# Patient Record
Sex: FEMALE | Race: AMERICAN INDIAN OR ALASKA NATIVE
[De-identification: names, ages, dates, MRNs, and addresses within clinical notes are randomized per-mention and may not be internally consistent; named-entity substitution may affect disease eponyms.]

---
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
## 2018-02-26 NOTE — EDM.PDOC
Scribed by Rehana Aguilera 02/25/18 1911 for Cady Guaman NP





ED HPI GENERAL MEDICAL PROBLEM





- General


Chief Complaint: General


Stated Complaint: [id number] DIZZY FOR 3 DAYS-GETTING WORSE


Time Seen by Provider: 02/25/18 17:23


Source of Information: Reports: Patient, RN, RN Notes Reviewed


History Limitations: Reports: No Limitations





- History of Present Illness


INITIAL COMMENTS - FREE TEXT/NARRATIVE: 


Patient presents to ER with complaint of dizziness which began 4 days ago. 

Yesterday she had visual disturbance--more blurred than usual. Denies ringing 

in her ears or ear pain. She is taking Claritin for sinuses. She has sinus 

congestion and nausea. She denies cough, fever, chills, vomiting, diarrhea, 

sore throat, headache, chest pains, palpitations or shortness of breath. 


Onset: Gradual


Location: Reports: Head


Quality: Reports: Ache


Severity: Moderate


Improves with: Reports: None


Worsens with: Reports: None


Associated Symptoms: Reports: No Other Symptoms





- Related Data


 Allergies











Allergy/AdvReac Type Severity Reaction Status Date / Time


 


amitriptyline HCl Allergy  Cannot Verified 02/25/18 16:58





[From Elavil]   Remember  


 


amlodipine besylate Allergy  Dizziness Verified 02/25/18 16:58





[From Norvasc]     


 


codeine Allergy  Irritabilit Verified 02/25/18 16:58





   y  


 


gemfibrozil [From Lopid] Allergy  Cannot Verified 02/25/18 16:58





   Remember  


 


metformin Allergy  Shaking Verified 02/25/18 16:58


 


Penicillins Allergy  Rash Verified 02/25/18 16:58


 


propranolol HCl Allergy  Cannot Verified 02/25/18 16:58





[From Inderal LA]   Remember  











Home Meds: 


 Home Meds





Acetaminophen 650 mg PO Q4HR PRN 12/16/14 [History]


Aspirin [Ecotrin] 81 mg PO DAILY 12/16/14 [History]


Furosemide [Furosemide] 1 tab PO DAILY 12/16/14 [History]


Insulin Aspart [NovoLOG] 20 unit SQ TID 12/16/14 [History]


Insulin Detemir [Levemir] 75 unit SQ BEDTIME 12/16/14 [History]


Lisinopril [Prinivil] 20 mg PO DAILY 12/16/14 [History]


Loratadine 10 mg PO DAILY 12/16/14 [History]


Metoprolol Succinate [Toprol XL] 50 mg PO DAILY 12/16/14 [History]


amLODIPine Besylate [Amlodipine Besylate] 10 mg PO DAILY 12/16/14 [History]


Ibuprofen [Motrin] 800 mg PO Q6H PRN 09/10/15 [History]


Clopidogrel [Plavix] 75 mg PO DAILY 02/16/16 [History]


Pioglitazone [Actos] 30 mg PO DAILY 02/25/18 [History]


glipiZIDE [Glucotrol] 10 mg PO BID 02/25/18 [History]











Past Medical History


HEENT History: Reports: Impaired Vision


Other HEENT History: wears glasses


Cardiovascular History: Reports: CAD, High Cholesterol, Hypertension, MI


Musculoskeletal History: Reports: Osteoarthritis


Neurological History: Reports: Neuropathy, Diabetic


Endocrine/Metabolic History: Reports: Diabetes, Type II, Obesity/BMI 30+





- Past Surgical History


HEENT Surgical History: Reports: Cataract Surgery


Cardiovascular Surgical History: Reports: Coronary Artery Bypass


Musculoskeletal Surgical History: Reports: Other (See Below)





Social & Family History





- Family History


Family Medical History: Noncontributory





- Tobacco Use


Smoking Status *Q: Never Smoker


Second Hand Smoke Exposure: No





- Caffeine Use


Caffeine Use: Reports: Coffee





- Alcohol Use


Days Per Week of Alcohol Use: 0





- Recreational Drug Use


Recreational Drug Use: No





- Living Situation & Occupation


Living situation: Reports: 


Occupation: Retired





ED ROS GENERAL





- Review of Systems


Review Of Systems: ROS reveals no pertinent complaints other than HPI.





ED EXAM, GENERAL





- Physical Exam


Exam: See Below


Exam Limited By: No Limitations


General Appearance: Alert, WD/WN, No Apparent Distress


Eye Exam: Bilateral Eye: Other (pupils -3 sluggish. )


Ears: Normal External Exam, Normal Canal, Hearing Grossly Normal, Normal TMs


Nose: Other (maxillary sinus tenderness.)


Throat/Mouth: Other (erythematous oropharynx)


Head: Atraumatic, Normocephalic


Neck: Normal Inspection, Supple, Non-Tender, Full Range of Motion


Respiratory/Chest: Crackles (bases bilateral)


Cardiovascular: Regular Rate, Rhythm


GI/Abdominal: Normal Bowel Sounds, Soft, Non-Tender, No Organomegaly, No 

Distention, No Abnormal Bruit, No Mass


 (Female) Exam: Deferred


Rectal (Female) Exam: Deferred


Back Exam: Normal Inspection, Full Range of Motion, NT


Extremities: Normal Inspection, Normal Range of Motion, Non-Tender, Normal 

Capillary Refill, No Pedal Edema


Neurological: Alert, Oriented, CN II-XII Intact, Normal Cognition, Normal Gait, 

Normal Reflexes, No Motor/Sensory Deficits


Psychiatric: Normal Affect, Normal Mood


Skin Exam: Warm, Dry, Intact, Normal Color, No Rash


Lymphatic: No Adenopathy





EKG INTERPRETATION


EKG Date: 02/25/18


Time: 17:35


Rhythm: Other (sinus bradycardia)


Rate (Beats/Min): 57


Comparison: Change From Previous EKG





Course





- Vital Signs


Last Recorded V/S: 


 Last Vital Signs











Temp  97.8 F   02/25/18 19:40


 


Pulse  57 L  02/25/18 19:40


 


Resp  16   02/25/18 19:40


 


BP  126/62   02/25/18 19:40


 


Pulse Ox  100   02/25/18 19:40














- Orders/Labs/Meds


Orders: 


 Active Orders 24 hr











 Category Date Time Status


 


 EKG Documentation Completion [RC] STAT Care  02/25/18 17:32 Active


 


 CULTURE URINE [RM] Stat Lab  02/25/18 18:13 Received











Labs: 


 Laboratory Tests











  02/25/18 02/25/18 02/25/18 Range/Units





  17:40 17:40 17:40 


 


WBC  7.3    (5.0-10.0)  10^3/uL


 


RBC  3.38 L    (4.2-5.4)  10^6/uL


 


Hgb  9.3 L D    (12.0-16.0)  g/dL


 


Hct  29.4 L    (37.0-47.0)  %


 


MCV  87.0    ()  fL


 


MCH  27.5    (27.0-34.0)  pg


 


MCHC  31.6 L    (33.0-35.0)  g/dL


 


Plt Count  416    (150-450)  10^3/uL


 


Neut % (Auto)  47.3    (42.2-75.2)  %


 


Lymph % (Auto)  33.7    (20.5-50.1)  %


 


Mono % (Auto)  13.9 H    (2-8)  %


 


Eos % (Auto)  4.4 H    (1.0-3.0)  %


 


Baso % (Auto)  0.7    (0.0-1.0)  %


 


Sodium   133 L   (135-145)  mmol/L


 


Potassium   4.9   (3.6-5.0)  mmol/L


 


Chloride   108   (101-111)  mmol/L


 


Carbon Dioxide   17.0 L   (21.0-31.0)  mmol/L


 


Anion Gap   12.9   


 


BUN   36 H   (7-18)  mg/dL


 


Creatinine   1.3   (0.6-1.3)  mg/dL


 


Est Cr Clr Drug Dosing   TNP   


 


Estimated GFR (MDRD)   40   


 


BUN/Creatinine Ratio   27.69   


 


Glucose   243 H   ()  mg/dL


 


Calcium   8.8   (8.4-10.2)  mg/dl


 


Total Bilirubin   0.2   (0.2-1.0)  mg/dL


 


AST   13   (10-42)  IU/L


 


ALT   11   (10-60)  IU/L


 


Alkaline Phosphatase   83   ()  IU/L


 


Creatine Kinase    32  ()  IU/L


 


Creatine Kinase Index    6.3 H  (0-2.4)  %


 


CK-MB (CK-2)    2.00  (0.4-4.7)  ng/mL


 


Troponin I     (0.00-0.02)  ng/ml


 


Total Protein   7.0   (6.7-8.2)  g/dl


 


Albumin   2.8 L   (3.2-5.5)  g/dl


 


Globulin   4.2   


 


Albumin/Globulin Ratio   0.67   


 


Urine Color     (YELLOW)  


 


Urine Appearance     (CLEAR)  


 


Urine pH     (5.0-9.0)  


 


Ur Specific Gravity     (1.005-1.030)  


 


Urine Protein     (NEGATIVE)  


 


Urine Glucose (UA)     (NEGATIVE)  


 


Urine Ketones     (NEGATIVE)  


 


Urine Occult Blood     (NEGATIVE)  


 


Urine Nitrite     (NEGATIVE)  


 


Urine Bilirubin     (NEGATIVE)  


 


Urine Urobilinogen     (0.2-1.0)  mg/dL


 


Ur Leukocyte Esterase     (NEGATIVE)  


 


Urine RBC     /HPF


 


Urine WBC     (0-5/HPF)  /HPF


 


Ur Epithelial Cells     /HPF


 


Amorphous Sediment     (0/HPF)  /HPF


 


Urine Bacteria     (0-FEW/HPF)  /HPF


 


Urine Mucus     /LPF


 


Urine Yeast     (0/HPF)  /HPF














  02/25/18 02/25/18 Range/Units





  17:40 18:13 


 


WBC    (5.0-10.0)  10^3/uL


 


RBC    (4.2-5.4)  10^6/uL


 


Hgb    (12.0-16.0)  g/dL


 


Hct    (37.0-47.0)  %


 


MCV    ()  fL


 


MCH    (27.0-34.0)  pg


 


MCHC    (33.0-35.0)  g/dL


 


Plt Count    (150-450)  10^3/uL


 


Neut % (Auto)    (42.2-75.2)  %


 


Lymph % (Auto)    (20.5-50.1)  %


 


Mono % (Auto)    (2-8)  %


 


Eos % (Auto)    (1.0-3.0)  %


 


Baso % (Auto)    (0.0-1.0)  %


 


Sodium    (135-145)  mmol/L


 


Potassium    (3.6-5.0)  mmol/L


 


Chloride    (101-111)  mmol/L


 


Carbon Dioxide    (21.0-31.0)  mmol/L


 


Anion Gap    


 


BUN    (7-18)  mg/dL


 


Creatinine    (0.6-1.3)  mg/dL


 


Est Cr Clr Drug Dosing    


 


Estimated GFR (MDRD)    


 


BUN/Creatinine Ratio    


 


Glucose    ()  mg/dL


 


Calcium    (8.4-10.2)  mg/dl


 


Total Bilirubin    (0.2-1.0)  mg/dL


 


AST    (10-42)  IU/L


 


ALT    (10-60)  IU/L


 


Alkaline Phosphatase    ()  IU/L


 


Creatine Kinase    ()  IU/L


 


Creatine Kinase Index    (0-2.4)  %


 


CK-MB (CK-2)    (0.4-4.7)  ng/mL


 


Troponin I  < 0.02   (0.00-0.02)  ng/ml


 


Total Protein    (6.7-8.2)  g/dl


 


Albumin    (3.2-5.5)  g/dl


 


Globulin    


 


Albumin/Globulin Ratio    


 


Urine Color   Yellow  (YELLOW)  


 


Urine Appearance   Turbid  (CLEAR)  


 


Urine pH   5.0  (5.0-9.0)  


 


Ur Specific Gravity   1.015  (1.005-1.030)  


 


Urine Protein   100 H  (NEGATIVE)  


 


Urine Glucose (UA)   500 H  (NEGATIVE)  


 


Urine Ketones   Negative  (NEGATIVE)  


 


Urine Occult Blood   Moderate H  (NEGATIVE)  


 


Urine Nitrite   Negative  (NEGATIVE)  


 


Urine Bilirubin   Negative  (NEGATIVE)  


 


Urine Urobilinogen   0.2  (0.2-1.0)  mg/dL


 


Ur Leukocyte Esterase   Large H  (NEGATIVE)  


 


Urine RBC   50-75 H  /HPF


 


Urine WBC   >100 H  (0-5/HPF)  /HPF


 


Ur Epithelial Cells   Many H  /HPF


 


Amorphous Sediment   Many H  (0/HPF)  /HPF


 


Urine Bacteria   Many H  (0-FEW/HPF)  /HPF


 


Urine Mucus   Many H  /LPF


 


Urine Yeast   Moderate H  (0/HPF)  /HPF











Meds: 


Medications














Discontinued Medications














Generic Name Dose Route Start Last Admin





  Trade Name Leda  PRN Reason Stop Dose Admin


 


Fluconazole  150 mg  02/25/18 19:03  02/25/18 19:32





  Diflucan  PO  02/25/18 19:04  Not Given





  ONETIME ONE   


 


Fluconazole  200 mg  02/26/18 09:00  





  Diflucan  PO   





  DAILY LULA   


 


Fluconazole  Confirm  02/25/18 19:28  02/25/18 19:33





  Diflucan  Administered  02/25/18 19:29  Not Given





  Dose   





  100 mg   





  .ROUTE   





  .STK-MED ONE   


 


Fluconazole  Confirm  02/25/18 19:29  02/25/18 19:33





  Diflucan  Administered  02/25/18 19:30  Not Given





  Dose   





  100 mg   





  .ROUTE   





  .STK-MED ONE   


 


Nitrofurantoin Macrocrystals  100 mg  02/25/18 19:03  02/25/18 19:32





  Macrobid  PO  02/25/18 19:04  100 mg





  ONETIME ONE   Administration














Departure





- Departure


Time of Disposition: 19:07


Disposition: Home, Self-Care 01


Condition: Fair


Clinical Impression: 


 Candidiasis of urogenital site





UTI (urinary tract infection)


Qualifiers:


 Urinary tract infection type: site unspecified Hematuria presence: without 

hematuria Qualified Code(s): N39.0 - Urinary tract infection, site not specified








- Discharge Information


Instructions:  Vaginal Yeast Infection, Adult, Urinary Tract Infection, Adult


Forms:  ED Department Discharge


Additional Instructions: 


RX: Diflucan, Macrobid


Drink plenty of water


Follow up with your primary care facility, urology consult








- My Orders


Last 24 Hours: 


My Active Orders





02/25/18 17:32


EKG Documentation Completion [RC] STAT 





02/25/18 18:13


CULTURE URINE [RM] Stat 














- Assessment/Plan


Last 24 Hours: 


My Active Orders





02/25/18 17:32


EKG Documentation Completion [RC] STAT 





02/25/18 18:13


CULTURE URINE [RM] Stat 














I have read and agree with the documentation that has been completed regarding 

this visit. By signing this record, I attest that the documentation was 

completed in my physical presence and is an accurate record of the encounter.

## 2018-02-28 NOTE — EKG
02/25/2018- ANA CORDON -

 

EKG per my reading shows sinus rhythm at the rate of 57 with inferior Q-waves.

 

DD:  02/28/2018 11:31:01

DT:  02/28/2018 11:37:17

[facility]

Job #:  [id number]

## 2018-04-18 NOTE — EDM.PDOC
ED HPI GENERAL MEDICAL PROBLEM





- General


Stated Complaint: POTASSIUM


Time Seen by Provider: 04/18/18 13:50


Source of Information: Reports: Patient, RN, RN Notes Reviewed


History Limitations: Reports: No Limitations





- History of Present Illness


INITIAL COMMENTS - FREE TEXT/NARRATIVE: 


Pt presents to the ER from [facility]. She was referred to the ER by Dr. Rocha. Report from Dr. Rocha was that the patient had labs completed at the 

clinic and her Potassium was 5.9 with a Bicarb of 14. Patient is to be taking 

oral Sodium Bicarb but has not been taking her medications as prescribed. Dr. Rocha would like her further evaluated here. 


Pt. states she was seeing Dr. Rocha for a diabetic follow up appointment today 

when she was told her labs were abnormal and that she needed to come to the ER. 

Patient denies any palpitations, chest pain, SOB, fever, chills, N/V/D. She 

states she has had somewhat of a cough for the past week. Pt states she has not 

been taking her medications as prescribed. 


Onset: Gradual





- Related Data


 Allergies











Allergy/AdvReac Type Severity Reaction Status Date / Time


 


amitriptyline HCl Allergy  Cannot Verified 02/25/18 16:58





[From Elavil]   Remember  


 


amlodipine besylate Allergy  Dizziness Verified 02/25/18 16:58





[From Norvasc]     


 


codeine Allergy  Irritabilit Verified 02/25/18 16:58





   y  


 


gemfibrozil [From Lopid] Allergy  Cannot Verified 02/25/18 16:58





   Remember  


 


metformin Allergy  Shaking Verified 02/25/18 16:58


 


Penicillins Allergy  Rash Verified 02/25/18 16:58


 


propranolol HCl Allergy  Cannot Verified 02/25/18 16:58





[From Inderal LA]   Remember  











Home Meds: 


 Home Meds





Acetaminophen 650 mg PO Q4HR PRN 12/16/14 [History]


Aspirin [Ecotrin] 81 mg PO DAILY 12/16/14 [History]


Furosemide 1 tab PO DAILY 12/16/14 [History]


Insulin Aspart [NovoLOG] 20 unit SQ TID 12/16/14 [History]


Insulin Detemir [Levemir] 75 unit SQ BEDTIME 12/16/14 [History]


Metoprolol Succinate [Toprol XL] 50 mg PO DAILY 12/16/14 [History]


Ibuprofen [Motrin] 800 mg PO Q6H PRN 09/10/15 [History]


Clopidogrel [Plavix] 75 mg PO DAILY 02/16/16 [History]


Pioglitazone [Actos] 30 mg PO DAILY 02/25/18 [History]


glipiZIDE [Glucotrol] 10 mg PO BID 02/25/18 [History]


Docusate Sodium [Colace] 1 tab PO BID PRN 04/18/18 [History]


Ferrous Sulfate [Iron] 1 tab PO DAILY 04/18/18 [History]


Sodium Bicarbonate 1 tab PO BID 04/18/18 [History]


atorvaSTATin [Lipitor] 1 tab PO DAILY 04/18/18 [History]











Past Medical History


HEENT History: Reports: Impaired Vision


Other HEENT History: wears glasses


Cardiovascular History: Reports: CAD, High Cholesterol, Hypertension, MI


Musculoskeletal History: Reports: Osteoarthritis


Neurological History: Reports: Neuropathy, Diabetic


Endocrine/Metabolic History: Reports: Diabetes, Type II, Obesity/BMI 30+





- Past Surgical History


HEENT Surgical History: Reports: Cataract Surgery


Cardiovascular Surgical History: Reports: Coronary Artery Bypass


Musculoskeletal Surgical History: Reports: Other (See Below)





Social & Family History





- Family History


Family Medical History: Noncontributory





- Tobacco Use


Smoking Status *Q: Never Smoker


Second Hand Smoke Exposure: No





- Caffeine Use


Caffeine Use: Reports: Coffee





- Alcohol Use


Days Per Week of Alcohol Use: 0





- Recreational Drug Use


Recreational Drug Use: No





- Living Situation & Occupation


Living situation: Reports: 


Occupation: Retired





ED ROS GENERAL





- Review of Systems


Review Of Systems: ROS reveals no pertinent complaints other than HPI.





ED EXAM, GENERAL





- Physical Exam


Exam: See Below


Exam Limited By: No Limitations


General Appearance: Alert, WD/WN, No Apparent Distress


Eye Exam: Bilateral Eye: EOMI, Normal Inspection


Ears: Normal External Exam, Hearing Grossly Normal


Nose: Normal Inspection


Throat/Mouth: Normal Inspection, Normal Voice, No Airway Compromise


Head: Atraumatic, Normocephalic


Neck: Normal Inspection


Respiratory/Chest: No Respiratory Distress, Lungs Clear, Normal Breath Sounds, 

No Accessory Muscle Use, Chest Non-Tender


Cardiovascular: Normal Peripheral Pulses, Regular Rate, Rhythm, No Edema, No 

Gallop, No JVD, No Murmur, No Rub


Peripheral Pulses: 2+: Radial (L), Radial (R)


GI/Abdominal: Normal Bowel Sounds, Soft, Non-Tender, No Organomegaly, No 

Distention, No Abnormal Bruit, No Mass


 (Female) Exam: Deferred


Rectal (Female) Exam: Deferred


Back Exam: Normal Inspection, Full Range of Motion, NT


Extremities: Normal Inspection, Normal Range of Motion, Non-Tender, Normal 

Capillary Refill, No Pedal Edema


Neurological: Alert, Oriented, CN II-XII Intact, Normal Cognition, Normal Gait, 

Normal Reflexes, No Motor/Sensory Deficits


Psychiatric: Normal Affect, Normal Mood


Skin Exam: Warm, Dry, Intact, Normal Color, No Rash


Lymphatic: No Adenopathy





EKG INTERPRETATION


EKG Date: 04/18/18


Time: 14:11


Rhythm: NSR (PAC)


Rate (Beats/Min): 61


Axis: Normal


P-Wave: Present


QRS: Wide


ST-T: Normal


QT: Normal


Comparison: Change From Previous EKG





Course





- Vital Signs


Last Recorded V/S: 


 Last Vital Signs











Temp  98.4 F   04/18/18 15:20


 


Pulse  67   04/18/18 15:20


 


Resp  18   04/18/18 15:20


 


BP  151/65 H  04/18/18 15:20


 


Pulse Ox  100   04/18/18 15:20














- Orders/Labs/Meds


Orders: 


 Active Orders 24 hr











 Category Date Time Status


 


 Blood Glucose Check, Bedside [RC] ONETIME Care  04/18/18 15:15 Active


 


 EKG Documentation Completion [RC] STAT Care  04/18/18 13:52 Active


 


 Sodium Bicarbonate Med  04/18/18 14:35 Active





 650 mg PO DAILY   








 Medication Orders





Sodium Bicarbonate (Sodium Bicarbonate)  650 mg PO DAILY [facility]


   Last Admin: 04/18/18 14:47  Dose: 650 mg








Labs: 


 Laboratory Tests











  04/18/18 04/18/18 04/18/18 Range/Units





  14:00 14:00 15:15 


 


WBC  7.7    (5.0-10.0)  10^3/uL


 


RBC  3.67 L    (4.2-5.4)  10^6/uL


 


Hgb  10.3 L    (12.0-16.0)  g/dL


 


Hct  32.2 L    (37.0-47.0)  %


 


MCV  87.7    ()  fL


 


MCH  28.1    (27.0-34.0)  pg


 


MCHC  32.0 L    (33.0-35.0)  g/dL


 


Plt Count  388    (150-450)  10^3/uL


 


Neut % (Auto)  52.5    (42.2-75.2)  %


 


Lymph % (Auto)  36.7    (20.5-50.1)  %


 


Mono % (Auto)  8.2 H    (2-8)  %


 


Eos % (Auto)  2.3    (1.0-3.0)  %


 


Baso % (Auto)  0.3    (0.0-1.0)  %


 


Sodium   131 L   (135-145)  mmol/L


 


Potassium   6.2 H   (3.6-5.0)  mmol/L


 


Chloride   109   (101-111)  mmol/L


 


Carbon Dioxide   15.0 L   (21.0-31.0)  mmol/L


 


Anion Gap   13.2   


 


BUN   63 H D   (7-18)  mg/dL


 


Creatinine   1.6 H   (0.6-1.3)  mg/dL


 


Est Cr Clr Drug Dosing   24.77   mL/min


 


Estimated GFR (MDRD)   32   


 


BUN/Creatinine Ratio   39.37   


 


Glucose   296 H   ()  mg/dL


 


POC Glucose    278 H  ()  mg/dl


 


Calcium   9.0   (8.4-10.2)  mg/dl


 


Magnesium   2.1   (1.8-2.5)  mg/dL


 


Total Bilirubin   0.4   (0.2-1.0)  mg/dL


 


AST   15   (10-42)  IU/L


 


ALT   13   (10-60)  IU/L


 


Alkaline Phosphatase   99   ()  IU/L


 


Total Protein   7.8   (6.7-8.2)  g/dl


 


Albumin   3.3   (3.2-5.5)  g/dl


 


Globulin   4.5   


 


Albumin/Globulin Ratio   0.73   











Meds: 


Medications











Generic Name Dose Route Start Last Admin





  Trade Name Freq  PRN Reason Stop Dose Admin


 


Sodium Bicarbonate  650 mg  04/18/18 14:35  04/18/18 14:47





  Sodium Bicarbonate  PO   650 mg





  DAILY LULA   Administration





     





     





     





     














Discontinued Medications














Generic Name Dose Route Start Last Admin





  Trade Name Freq  PRN Reason Stop Dose Admin


 


Insulin Human Regular  20 unit  04/18/18 14:37  04/18/18 14:45





  Humulin R  SUBCUT  04/18/18 14:38  20 unit





  ONETIME ONE   Administration





     





     





     





     


 


Sodium Polystyrene Sulfonate  15 gm  04/18/18 14:33  04/18/18 14:47





  Kayexalate  PO  04/18/18 14:34  15 gm





  ONETIME ONE   Administration





     





     





     





     














Departure





- Departure


Time of Disposition: 15:29


Disposition: Home, Self-Care 01


Condition: Fair


Clinical Impression: 


 Hyperkalemia, Hyperglycemia








- Discharge Information


Instructions:  Hyperglycemia, Easy-to-Read, Hyperkalemia, Easy-to-Read


Forms:  ED Department Discharge


Additional Instructions: 


Follow up with Dr. Rocha at [facility] tomorrow for a recheck of labs. 


Take your insulin and other medications as prescribed. 








- My Orders


Last 24 Hours: 


My Active Orders





04/18/18 13:52


EKG Documentation Completion [RC] STAT 





04/18/18 14:35


Sodium Bicarbonate   650 mg PO DAILY 





04/18/18 15:15


Blood Glucose Check, Bedside [RC] ONETIME 














- Assessment/Plan


Last 24 Hours: 


My Active Orders





04/18/18 13:52


EKG Documentation Completion [RC] STAT 





04/18/18 14:35


Sodium Bicarbonate   650 mg PO DAILY 





04/18/18 15:15


Blood Glucose Check, Bedside [RC] ONETIME

## 2018-04-23 NOTE — EKG
04/18/2018- ANA CORDON -

 

EKG, per my reading, shows sinus rhythm at a rate of 61.

 

DD:  04/21/2018 21:15:26

DT:  04/22/2018 00:59:47

[facility]

Job #:  [id number]

## 2020-01-01 ENCOUNTER — HOSPITAL ENCOUNTER (EMERGENCY)
Dept: HOSPITAL 43 - DL.ED | Age: 75
Discharge: HOME | End: 2020-01-01
Payer: MEDICARE

## 2020-01-01 VITALS — SYSTOLIC BLOOD PRESSURE: 149 MMHG | DIASTOLIC BLOOD PRESSURE: 64 MMHG | HEART RATE: 64 BPM

## 2020-01-01 DIAGNOSIS — Z88.5: ICD-10-CM

## 2020-01-01 DIAGNOSIS — N39.0: Primary | ICD-10-CM

## 2020-01-01 DIAGNOSIS — E66.9: ICD-10-CM

## 2020-01-01 DIAGNOSIS — Z88.8: ICD-10-CM

## 2020-01-01 DIAGNOSIS — Z79.82: ICD-10-CM

## 2020-01-01 DIAGNOSIS — I10: ICD-10-CM

## 2020-01-01 DIAGNOSIS — Z79.4: ICD-10-CM

## 2020-01-01 DIAGNOSIS — Z79.899: ICD-10-CM

## 2020-01-01 DIAGNOSIS — E78.00: ICD-10-CM

## 2020-01-01 DIAGNOSIS — Z88.0: ICD-10-CM

## 2020-01-01 DIAGNOSIS — E11.40: ICD-10-CM

## 2020-01-01 DIAGNOSIS — I25.10: ICD-10-CM

## 2020-01-01 LAB
ANION GAP SERPL CALC-SCNC: 14.5 MMOL/L
CHLORIDE SERPL-SCNC: 104 MMOL/L (ref 101–111)
SODIUM SERPL-SCNC: 133 MMOL/L (ref 135–145)

## 2020-01-01 PROCEDURE — 99284 EMERGENCY DEPT VISIT MOD MDM: CPT

## 2020-01-01 PROCEDURE — 85025 COMPLETE CBC W/AUTO DIFF WBC: CPT

## 2020-01-01 PROCEDURE — G0480 DRUG TEST DEF 1-7 CLASSES: HCPCS

## 2020-01-01 PROCEDURE — 80053 COMPREHEN METABOLIC PANEL: CPT

## 2020-01-01 PROCEDURE — 70450 CT HEAD/BRAIN W/O DYE: CPT

## 2020-01-01 PROCEDURE — 85730 THROMBOPLASTIN TIME PARTIAL: CPT

## 2020-01-01 PROCEDURE — 87186 SC STD MICRODIL/AGAR DIL: CPT

## 2020-01-01 PROCEDURE — 85610 PROTHROMBIN TIME: CPT

## 2020-01-01 PROCEDURE — 80305 DRUG TEST PRSMV DIR OPT OBS: CPT

## 2020-01-01 PROCEDURE — 99285 EMERGENCY DEPT VISIT HI MDM: CPT

## 2020-01-01 PROCEDURE — 87086 URINE CULTURE/COLONY COUNT: CPT

## 2020-01-01 PROCEDURE — 87088 URINE BACTERIA CULTURE: CPT

## 2020-01-01 PROCEDURE — 71045 X-RAY EXAM CHEST 1 VIEW: CPT

## 2020-01-01 PROCEDURE — 93010 ELECTROCARDIOGRAM REPORT: CPT

## 2020-01-01 PROCEDURE — 81001 URINALYSIS AUTO W/SCOPE: CPT

## 2020-01-01 PROCEDURE — 82962 GLUCOSE BLOOD TEST: CPT

## 2020-01-01 PROCEDURE — 93005 ELECTROCARDIOGRAM TRACING: CPT

## 2020-01-01 PROCEDURE — 84484 ASSAY OF TROPONIN QUANT: CPT

## 2020-01-01 PROCEDURE — 36415 COLL VENOUS BLD VENIPUNCTURE: CPT

## 2020-01-01 NOTE — EDM.PDOC
Scribed by Rehana Aguilera [id number] for Bautista Perez MD





ED HPI GENERAL MEDICAL PROBLEM





- General


Chief Complaint: Neurological Problem


Stated Complaint: SLURRING/MOUTH IS DROOPING


Time Seen by Provider: 20 17:30


Source of Information: Reports: Patient, RN, RN Notes Reviewed


History Limitations: Reports: No Limitations





- History of Present Illness


INITIAL COMMENTS - FREE TEXT/NARRATIVE: 


Patient presents to ER with daughter stating that at 1500 today she had left 

side facial droop and slurring of words. She said that it happened once 

yesterday also. She thinks her blood sugars are high, and her mouth feels dry. 

Denies headache, visual changes, slurred speech, or motor weakness. Also 

reports a few days of dysuria. Denies fever, chills, or flank pain.


Onset: Today


Duration: Resolved Prior to Arrival


Location: Reports: Face


Severity: Severe


Improves with: Reports: None


Worsens with: Reports: None


Associated Symptoms: Reports: No Other Symptoms





- Related Data


 Allergies











Allergy/AdvReac Type Severity Reaction Status Date / Time


 


gemfibrozil [From Lopid] Allergy  Cannot Verified 19 14:50





   Remember  


 


Penicillins Allergy  Rash Verified 19 14:50


 


propranolol HCl Allergy  Cannot Verified 19 14:50





[From Inderal LA]   Remember  


 


codeine AdvReac  Irritabilit Verified 19 14:50





   y  


 


metformin AdvReac  Shaking Verified 19 14:50











Home Meds: 


 Home Meds





Insulin Aspart [NovoLOG] 20 unit SQ TID 14 [History]


Metoprolol Succinate [Toprol XL] 75 mg PO BID 14 [History]


Aspirin [Lo-Dose Aspirin EC] 81 mg PO DAILY 19 [History]


Clopidogrel [Plavix] 75 mg PO DAILY 19 [History]


Ergocalciferol (Vitamin D2) [Vitamin D2] 50,000 unit PO WEEKLY 19 [History

]


Furosemide 40 mg PO DAILY 19 [History]


Oxybutynin Chloride [Ditropan Xl] 20 mg PO DAILY 19 [History]


Sodium Bicarbonate 650 mg PO BID 19 [History]


atorvaSTATin [Lipitor] 20 mg PO BEDTIME 19 [History]











Past Medical History


HEENT History: Reports: Impaired Vision


Other HEENT History: wears glasses


Cardiovascular History: Reports: CAD, High Cholesterol, Hypertension, MI, Stents


Gastrointestinal History: Reports: Chronic Constipation


Genitourinary History: Reports: UTI, Recurrent


OB/GYN History: Reports: Pregnancy


Musculoskeletal History: Reports: Osteoarthritis


Neurological History: Reports: Neuropathy, Diabetic


Endocrine/Metabolic History: Reports: Diabetes, Type II, Obesity/BMI 30+


Hematologic History: Reports: Anemia





- Past Surgical History


HEENT Surgical History: Reports: Cataract Surgery


Cardiovascular Surgical History: Reports: Coronary Artery Bypass


GI Surgical History: Reports: Hernia Repair/Other


Female  Surgical History: Reports: Other (See Below)


Other Female  Surgeries/Procedures: bladder surgery





Social & Family History





- Family History


Family Medical History: Noncontributory





- Caffeine Use


Caffeine Use: Reports: None





- Living Situation & Occupation


Living situation: Reports: 


Occupation: Retired





ED ROS GENERAL





- Review of Systems


Review Of Systems: Comprehensive ROS is negative, except as noted in HPI.





ED EXAM, NEURO





- Physical Exam


Exam: See Below


Exam Limited By: No Limitations


General Appearance: Alert, WD/WN, No Apparent Distress


Eye Exam: Bilateral Eye: EOMI, Normal Inspection, PERRL


Ears: Normal External Exam, Normal Canal, Hearing Grossly Normal, Normal TMs


Nose: Normal Inspection, Normal Mucosa, No Blood


Throat/Mouth: Normal Inspection, Normal Lips, Normal Teeth, Normal Gums, Normal 

Oropharynx, Normal Voice, No Airway Compromise


Head Exam: Atraumatic, Normocephalic


Neck: Normal Inspection, Supple, Non-Tender, Full Range of Motion


Respiratory/Chest: No Respiratory Distress, Lungs Clear, Normal Breath Sounds, 

No Accessory Muscle Use, Chest Non-Tender


Cardiovascular: Normal Peripheral Pulses, Regular Rate, Rhythm, No Edema, No 

Gallop, No JVD, No Murmur, No Rub


GI/Abdominal: Normal Bowel Sounds, Soft, Non-Tender, No Organomegaly, No 

Distention, No Abnormal Bruit, No Mass


 (Female) Exam: Deferred


Rectal (Female) Exam: Deferred


Neurological: Alert, Normal Mood/Affect, Normal Dorsiflexion, CN II-XII Intact, 

Normal Plantar Flexion, Normal Gait, Normal Reflexes, No Motor/Sensory Deficits

, Oriented x 3, Other (NIH score 0.)


Back Exam: Normal Inspection, Full Range of Motion, NT


Extremities: Normal Inspection, Normal Range of Motion, Non-Tender, No Pedal 

Edema, Normal Capillary Refill


Psychiatric: Normal Affect, Normal Mood


Skin Exam: Warm, Dry, Intact, Normal Color, No Rash





EKG INTERPRETATION


EKG Date: 20


Time: 17:57


Rhythm: Other (sinus rhythm)


Rate (Beats/Min): 63


Axis: Normal


P-Wave: Present


QRS: Other (abnormal R wave  progression in the anterior and lateral leads. 

Inferior Q waves.)


ST-T: Normal


QT: Normal


Comparison: No Change





Course





- Vital Signs


Last Recorded V/S: 


 Last Vital Signs











Temp  98.3 F   20 18:00


 


Pulse  64   20 18:00


 


Resp  26 H  20 18:00


 


BP  149/64 H  20 18:00


 


Pulse Ox  100   20 18:00














- Orders/Labs/Meds


Orders: 


 Active Orders 24 hr











 Category Date Time Status


 


 Blood Glucose Check, Bedside [RC] ONETIME Care  20 17:50 Active


 


 EKG 12 Lead [EKG Documentation Completion] [RC] STAT Care  20 17:50 

Active


 


 NIH Stroke Scale [RC] ASDIRECTED Care  20 17:49 Active


 


 Peripheral IV Care [RC] .AS DIRECTED Care  20 17:50 Active


 


 Chest 1V Frontal [CR] Stat Exams  20 17:50 Taken


 


 Head wo Cont [CT] Stat Exams  20 17:48 Taken


 


 CULTURE URINE [RM] Stat Lab  20 18:29 Received


 


 Sodium Chloride 0.9% [Saline Flush] Med  20 17:49 Active





 10 ml FLUSH ASDIRECTED PRN   


 


 Peripheral IV Insertion Adult [OM.PC] Stat Oth  20 17:50 Ordered








 Medication Orders





Sodium Chloride (Saline Flush)  10 ml FLUSH ASDIRECTED PRN


   PRN Reason: Keep Vein Open


   Last Admin: 20 18:11  Dose: 10 ml








Labs: 


 Laboratory Tests











  20 Range/Units





  17:38 17:46 17:46 


 


WBC   7.3   (5.0-10.0)  10^3/uL


 


RBC   3.65 L   (4.2-5.4)  10^6/uL


 


Hgb   10.3 L   (12.0-16.0)  g/dL


 


Hct   31.9 L   (37.0-47.0)  %


 


MCV   87.4  D   ()  fL


 


MCH   28.2   (27.0-34.0)  pg


 


MCHC   32.3 L   (33.0-35.0)  g/dL


 


Plt Count   326   (150-450)  10^3/uL


 


Neut % (Auto)   41.5 L   (42.2-75.2)  %


 


Lymph % (Auto)   41.7   (20.5-50.1)  %


 


Mono % (Auto)   9.4 H   (2-8)  %


 


Eos % (Auto)   6.7 H   (1.0-3.0)  %


 


Baso % (Auto)   0.7   (0.0-1.0)  %


 


PT    9.5  (9.0-12.0)  SEC


 


INR    0.9  (0.9-1.2)  


 


APTT     (22.0-34.0)  SEC


 


Sodium     (135-145)  mmol/L


 


Potassium     (3.6-5.0)  mmol/L


 


Chloride     (101-111)  mmol/L


 


Carbon Dioxide     (21.0-31.0)  mmol/L


 


Anion Gap     


 


BUN     (7-18)  mg/dL


 


Creatinine     (0.6-1.3)  mg/dL


 


Est Cr Clr Drug Dosing     mL/min


 


Estimated GFR (MDRD)     


 


BUN/Creatinine Ratio     


 


Glucose     ()  mg/dL


 


POC Glucose  235 H    ()  mg/dl


 


Calcium     (8.4-10.2)  mg/dl


 


Total Bilirubin     (0.2-1.0)  mg/dL


 


AST     (10-42)  IU/L


 


ALT     (10-60)  IU/L


 


Alkaline Phosphatase     ()  IU/L


 


Troponin I     (0.00-0.02)  ng/ml


 


Total Protein     (6.7-8.2)  g/dl


 


Albumin     (3.2-5.5)  g/dl


 


Globulin     


 


Albumin/Globulin Ratio     


 


Urine Color     (YELLOW)  


 


Urine Appearance     (CLEAR)  


 


Urine pH     (5.0-9.0)  


 


Ur Specific Gravity     (1.005-1.030)  


 


Urine Protein     (NEGATIVE)  


 


Urine Glucose (UA)     (NEGATIVE)  


 


Urine Ketones     (NEGATIVE)  


 


Urine Occult Blood     (NEGATIVE)  


 


Urine Nitrite     (NEGATIVE)  


 


Urine Bilirubin     (NEGATIVE)  


 


Urine Urobilinogen     (0.2-1.0)  mg/dL


 


Ur Leukocyte Esterase     (NEGATIVE)  


 


Urine RBC     /HPF


 


Urine WBC     (0-5/HPF)  /HPF


 


Ur Epithelial Cells     (NOT SEEN)  /HPF


 


Amorphous Sediment     (NOT SEEN)  /HPF


 


Urine Bacteria     (0-FEW/HPF)  /HPF


 


Urine Mucus     (NOT SEEN)  /LPF


 


Urine Opiates Screen     (NEGATIVE)  


 


Ur Oxycodone Screen     (NEGATIVE)  


 


Urine Methadone Screen     (NEGATIVE)  


 


Ur Barbiturates Screen     (NEGATIVE)  


 


U Tricyclic Antidepress     (NEGATIVE)  


 


Ur Phencyclidine Scrn     (NEGATIVE)  


 


Ur Amphetamine Screen     (NEGATIVE)  


 


U Methamphetamines Scrn     (NEGATIVE)  


 


Urine MDMA Screen     (NEGATIVE)  


 


U Benzodiazepines Scrn     (NEGATIVE)  


 


Urine Cocaine Screen     (NEGATIVE)  


 


U Marijuana (THC) Screen     (NEGATIVE)  


 


Ethyl Alcohol     mg/dL














  20 Range/Units





  17:46 17:46 18:29 


 


WBC     (5.0-10.0)  10^3/uL


 


RBC     (4.2-5.4)  10^6/uL


 


Hgb     (12.0-16.0)  g/dL


 


Hct     (37.0-47.0)  %


 


MCV     ()  fL


 


MCH     (27.0-34.0)  pg


 


MCHC     (33.0-35.0)  g/dL


 


Plt Count     (150-450)  10^3/uL


 


Neut % (Auto)     (42.2-75.2)  %


 


Lymph % (Auto)     (20.5-50.1)  %


 


Mono % (Auto)     (2-8)  %


 


Eos % (Auto)     (1.0-3.0)  %


 


Baso % (Auto)     (0.0-1.0)  %


 


PT     (9.0-12.0)  SEC


 


INR     (0.9-1.2)  


 


APTT   23.4   (22.0-34.0)  SEC


 


Sodium  133 L    (135-145)  mmol/L


 


Potassium  4.5    (3.6-5.0)  mmol/L


 


Chloride  104    (101-111)  mmol/L


 


Carbon Dioxide  19.0 L    (21.0-31.0)  mmol/L


 


Anion Gap  14.5    


 


BUN  37 H    (7-18)  mg/dL


 


Creatinine  1.5 H    (0.6-1.3)  mg/dL


 


Est Cr Clr Drug Dosing  26.81    mL/min


 


Estimated GFR (MDRD)  34    


 


BUN/Creatinine Ratio  24.66    


 


Glucose  252 H    ()  mg/dL


 


POC Glucose     ()  mg/dl


 


Calcium  8.8    (8.4-10.2)  mg/dl


 


Total Bilirubin  0.5    (0.2-1.0)  mg/dL


 


AST  13    (10-42)  IU/L


 


ALT  8 L    (10-60)  IU/L


 


Alkaline Phosphatase  81    ()  IU/L


 


Troponin I  0.02    (0.00-0.02)  ng/ml


 


Total Protein  7.0    (6.7-8.2)  g/dl


 


Albumin  3.3    (3.2-5.5)  g/dl


 


Globulin  3.7    


 


Albumin/Globulin Ratio  0.89    


 


Urine Color    Dark yellow  (YELLOW)  


 


Urine Appearance    Cloudy  (CLEAR)  


 


Urine pH    7.5  (5.0-9.0)  


 


Ur Specific Gravity    1.020  (1.005-1.030)  


 


Urine Protein    100 H  (NEGATIVE)  


 


Urine Glucose (UA)    Negative  (NEGATIVE)  


 


Urine Ketones    Negative  (NEGATIVE)  


 


Urine Occult Blood    Moderate H  (NEGATIVE)  


 


Urine Nitrite    Negative  (NEGATIVE)  


 


Urine Bilirubin    Negative  (NEGATIVE)  


 


Urine Urobilinogen    2.0 H  (0.2-1.0)  mg/dL


 


Ur Leukocyte Esterase    Large H  (NEGATIVE)  


 


Urine RBC    10-20 H  /HPF


 


Urine WBC    >100 H  (0-5/HPF)  /HPF


 


Ur Epithelial Cells    Few  (NOT SEEN)  /HPF


 


Amorphous Sediment    Few  (NOT SEEN)  /HPF


 


Urine Bacteria    Moderate H  (0-FEW/HPF)  /HPF


 


Urine Mucus    Few H  (NOT SEEN)  /LPF


 


Urine Opiates Screen     (NEGATIVE)  


 


Ur Oxycodone Screen     (NEGATIVE)  


 


Urine Methadone Screen     (NEGATIVE)  


 


Ur Barbiturates Screen     (NEGATIVE)  


 


U Tricyclic Antidepress     (NEGATIVE)  


 


Ur Phencyclidine Scrn     (NEGATIVE)  


 


Ur Amphetamine Screen     (NEGATIVE)  


 


U Methamphetamines Scrn     (NEGATIVE)  


 


Urine MDMA Screen     (NEGATIVE)  


 


U Benzodiazepines Scrn     (NEGATIVE)  


 


Urine Cocaine Screen     (NEGATIVE)  


 


U Marijuana (THC) Screen     (NEGATIVE)  


 


Ethyl Alcohol  < 5    mg/dL














  20 Range/Units





  18:29 


 


WBC   (5.0-10.0)  10^3/uL


 


RBC   (4.2-5.4)  10^6/uL


 


Hgb   (12.0-16.0)  g/dL


 


Hct   (37.0-47.0)  %


 


MCV   ()  fL


 


MCH   (27.0-34.0)  pg


 


MCHC   (33.0-35.0)  g/dL


 


Plt Count   (150-450)  10^3/uL


 


Neut % (Auto)   (42.2-75.2)  %


 


Lymph % (Auto)   (20.5-50.1)  %


 


Mono % (Auto)   (2-8)  %


 


Eos % (Auto)   (1.0-3.0)  %


 


Baso % (Auto)   (0.0-1.0)  %


 


PT   (9.0-12.0)  SEC


 


INR   (0.9-1.2)  


 


APTT   (22.0-34.0)  SEC


 


Sodium   (135-145)  mmol/L


 


Potassium   (3.6-5.0)  mmol/L


 


Chloride   (101-111)  mmol/L


 


Carbon Dioxide   (21.0-31.0)  mmol/L


 


Anion Gap   


 


BUN   (7-18)  mg/dL


 


Creatinine   (0.6-1.3)  mg/dL


 


Est Cr Clr Drug Dosing   mL/min


 


Estimated GFR (MDRD)   


 


BUN/Creatinine Ratio   


 


Glucose   ()  mg/dL


 


POC Glucose   ()  mg/dl


 


Calcium   (8.4-10.2)  mg/dl


 


Total Bilirubin   (0.2-1.0)  mg/dL


 


AST   (10-42)  IU/L


 


ALT   (10-60)  IU/L


 


Alkaline Phosphatase   ()  IU/L


 


Troponin I   (0.00-0.02)  ng/ml


 


Total Protein   (6.7-8.2)  g/dl


 


Albumin   (3.2-5.5)  g/dl


 


Globulin   


 


Albumin/Globulin Ratio   


 


Urine Color   (YELLOW)  


 


Urine Appearance   (CLEAR)  


 


Urine pH   (5.0-9.0)  


 


Ur Specific Gravity   (1.005-1.030)  


 


Urine Protein   (NEGATIVE)  


 


Urine Glucose (UA)   (NEGATIVE)  


 


Urine Ketones   (NEGATIVE)  


 


Urine Occult Blood   (NEGATIVE)  


 


Urine Nitrite   (NEGATIVE)  


 


Urine Bilirubin   (NEGATIVE)  


 


Urine Urobilinogen   (0.2-1.0)  mg/dL


 


Ur Leukocyte Esterase   (NEGATIVE)  


 


Urine RBC   /HPF


 


Urine WBC   (0-5/HPF)  /HPF


 


Ur Epithelial Cells   (NOT SEEN)  /HPF


 


Amorphous Sediment   (NOT SEEN)  /HPF


 


Urine Bacteria   (0-FEW/HPF)  /HPF


 


Urine Mucus   (NOT SEEN)  /LPF


 


Urine Opiates Screen  Negative  (NEGATIVE)  


 


Ur Oxycodone Screen  Negative  (NEGATIVE)  


 


Urine Methadone Screen  Negative  (NEGATIVE)  


 


Ur Barbiturates Screen  Negative  (NEGATIVE)  


 


U Tricyclic Antidepress  Negative  (NEGATIVE)  


 


Ur Phencyclidine Scrn  Negative  (NEGATIVE)  


 


Ur Amphetamine Screen  Negative  (NEGATIVE)  


 


U Methamphetamines Scrn  Negative  (NEGATIVE)  


 


Urine MDMA Screen  Negative  (NEGATIVE)  


 


U Benzodiazepines Scrn  Negative  (NEGATIVE)  


 


Urine Cocaine Screen  Negative  (NEGATIVE)  


 


U Marijuana (THC) Screen  Negative  (NEGATIVE)  


 


Ethyl Alcohol   mg/dL











Meds: 


Medications











Generic Name Dose Route Start Last Admin





  Trade Name Freq  PRN Reason Stop Dose Admin


 


Sodium Chloride  10 ml  20 17:49  20 18:11





  Saline Flush  FLUSH   10 ml





  ASDIRECTED PRN   Administration





  Keep Vein Open   





     





     





     














Discontinued Medications














Generic Name Dose Route Start Last Admin





  Trade Name Freq  PRN Reason Stop Dose Admin


 


Ciprofloxacin  500 mg  20 19:06  





  Ciprofloxacin Hcl  PO  20 19:07  





  ONETIME ONE   





     





     





     





     














- Radiology Interpretation


Free Text/Narrative:: 


[facility]


Final Radiology Report  Call: [phone]


assistance Online chat: [email or web address]


Name: ANA CORDON Age: 74Years F Date: 2020


MRN: [id number] SSN: -- : 1945


Study: CT HEAD WO Requesting Physician: BAUTISTA PEREZ


Accession: [id number] Images: 145


Addl Studies:


Provided Clinical History:


Contrast: Without Contrast Medium:


Contrast Amount: Contrast Method:


Page 1 of 2


PROCEDURE INFORMATION:


Exam: CT Head Without Contrast


Exam date and time: 2020 5:40 PM


Age: 74 years old


Clinical indication: Other: Stroke code, left facial droop


TECHNIQUE:


Imaging protocol: Computed tomography of the head without contrast.


Radiation optimization: All CT scans at this facility use at least one of these 

dose optimization


techniques: automated exposure control; mA and/or kV adjustment per patient 

size (includes targeted


exams where dose is matched to clinical indication); or iterative 

reconstruction.


COMPARISON:


No relevant prior studies available.


FINDINGS:


Brain: There is diffuse cerebral atrophy concordant with the patient's age. 

Chronic small vessel deep


white matter ischemic disease is suggested by areas of patchy white matter low 

attenuation. No


intracranial hemorrhage. No acute large territory CVA. No mass. No acute edema. 

No acute


intracranial abnormality. Benign-appearing left basal gangliar region 

calcification.


Ventricles: Normal. No ventriculomegaly.


Bones/joints: Unremarkable. No acute fracture.


Sinuses: Visualized sinuses are unremarkable. No fluid levels.


Mastoid air cells: Visualized mastoid air cells are well aerated.


Soft tissues: Unremarkable.


IMPRESSION:


1. No acute intracranial abnormality.


2. No large territory acute CVA.


3. No intracranial hemorrhage.


ANA CORDON Accession: [id number] MRN:[id number] | Final Radiology Report


CONFIDENTIALITY STATEMENT


This report is intended only for use by the referring physician, and only in 

accordance with law. If you received this in error, call [phone].


Page 2 of 2


4. Age-related atrophy, chronic small vessel ischemic features, and old lacunar 

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


caudate nucleus region.


Thank you for allowing us to participate in the care of your patient.


Dictated and Authenticated by: Chun Muse,


2020 6:09 PM Central Time ([place] & [place])





[facility]


Final Radiology Report  Call: [phone]


assistance Online chat: [email or web address]


Name: ANA CORDON Age: 74Years F Date: 2020


MRN: [id number] SSN: -- : 1945


Study: XR CHEST 1 VIEW FRONTAL Requesting Physician: BAUTISTA PEREZ


Accession: [id number] Images: 1


Addl Studies:


Provided Clinical History:


Contrast: Contrast Medium:


Contrast Amount: Contrast Method:


CONFIDENTIALITY STATEMENT


This report is intended only for use by the referring physician, and only in 

accordance with law. If you received this in error, call [phone].


Page 1 of 1


PROCEDURE INFORMATION:


Exam: XR Chest, 1 View


Exam date and time: 2020 6:03 PM


Age: 74 years old


Clinical indication: Other: Stroke code, possible aspiration


TECHNIQUE:


Imaging protocol: XR of the chest


Views: 1 view.


COMPARISON:


CR Chest 1V Frontal 2019 1:31 PM


FINDINGS:


Lungs: Bibasilar atelectatic lung features. Some linear areas of opacification 

noted. This may


represent scarring. There are similar features seen on prior study 2019.


Pleural space: Unremarkable. No pleural effusion. No pneumothorax.


Heart/Mediastinum: Mild-to-moderate cardiac enlargement. Previous open-heart 

surgery.


Bones/joints: Unremarkable.


IMPRESSION:


1. Bibasilar linear scarring versus minor atelectasis.


2. Mild-to-moderate cardiac enlargement. Previous open-heart surgery.


3. No acute edema or infiltrates.


Thank you for allowing us to participate in the care of your patient.


Dictated and Authenticated by: Chun Muse MD


2020 6:10 PM Central Time ([place] & [place])





Departure





- Departure


Time of Disposition: 19:12


Disposition: Home, Self-Care 01


Condition: Good


Clinical Impression: 


UTI (urinary tract infection)


Qualifiers:


 Urinary tract infection type: site unspecified Hematuria presence: without 

hematuria Qualified Code(s): N39.0 - Urinary tract infection, site not specified








- Discharge Information


*PRESCRIPTION DRUG MONITORING PROGRAM REVIEWED*: No


*COPY OF PRESCRIPTION DRUG MONITORING REPORT IN PATIENT GUNJAN: No


Instructions:  Urinary Tract Infection, Adult, Transient Ischemic Attack, Easy-

to-Read


Forms:  ED Department Discharge


Additional Instructions: 


Rx: Cipro 500mg


Continue all your current medications as prescribed.


Follow up in clinic next week for recheck.


Return to ER if worse at any time.





Sepsis Event Note





- Focused Exam


Vital Signs: 


 Vital Signs











  Temp Pulse Resp BP Pulse Ox


 


 20 18:00  98.3 F  64  26 H  149/64 H  100











Date Exam was Performed: 20


Time Exam was Performed: 19:11





- My Orders


Last 24 Hours: 


My Active Orders





20 17:48


Head wo Cont [CT] Stat 





20 17:49


NIH Stroke Scale [RC] ASDIRECTED 


Sodium Chloride 0.9% [Saline Flush]   10 ml FLUSH ASDIRECTED PRN 





20 17:50


Blood Glucose Check, Bedside [RC] ONETIME 


EKG 12 Lead [EKG Documentation Completion] [RC] STAT 


Peripheral IV Care [RC] .AS DIRECTED 


Chest 1V Frontal [CR] Stat 


Peripheral IV Insertion Adult [OM.PC] Stat 





20 18:29


CULTURE URINE [RM] Stat 














- Assessment/Plan


Last 24 Hours: 


My Active Orders





20 17:48


Head wo Cont [CT] Stat 





20 17:49


NIH Stroke Scale [RC] ASDIRECTED 


Sodium Chloride 0.9% [Saline Flush]   10 ml FLUSH ASDIRECTED PRN 





20 17:50


Blood Glucose Check, Bedside [RC] ONETIME 


EKG 12 Lead [EKG Documentation Completion] [RC] STAT 


Peripheral IV Care [RC] .AS DIRECTED 


Chest 1V Frontal [CR] Stat 


Peripheral IV Insertion Adult [OM.PC] Stat 





20 18:29


CULTURE URINE [RM] Stat 














I have read and agree with the documentation that has been completed regarding 

this visit. By signing this record, I attest that the documentation was 

completed in my physical presence and is an accurate record of the encounter.

## 2020-02-18 ENCOUNTER — HOSPITAL ENCOUNTER (EMERGENCY)
Dept: HOSPITAL 43 - DL.ED | Age: 75
Discharge: HOME | End: 2020-02-18
Payer: MEDICARE

## 2020-02-18 VITALS — DIASTOLIC BLOOD PRESSURE: 59 MMHG | SYSTOLIC BLOOD PRESSURE: 150 MMHG | HEART RATE: 58 BPM

## 2020-02-18 DIAGNOSIS — Z95.1: ICD-10-CM

## 2020-02-18 DIAGNOSIS — E66.9: ICD-10-CM

## 2020-02-18 DIAGNOSIS — E11.40: ICD-10-CM

## 2020-02-18 DIAGNOSIS — Z79.02: ICD-10-CM

## 2020-02-18 DIAGNOSIS — Z88.0: ICD-10-CM

## 2020-02-18 DIAGNOSIS — I25.10: ICD-10-CM

## 2020-02-18 DIAGNOSIS — Z88.8: ICD-10-CM

## 2020-02-18 DIAGNOSIS — Z88.5: ICD-10-CM

## 2020-02-18 DIAGNOSIS — Z87.440: ICD-10-CM

## 2020-02-18 DIAGNOSIS — Z79.4: ICD-10-CM

## 2020-02-18 DIAGNOSIS — I10: ICD-10-CM

## 2020-02-18 DIAGNOSIS — Z79.899: ICD-10-CM

## 2020-02-18 DIAGNOSIS — I25.2: ICD-10-CM

## 2020-02-18 DIAGNOSIS — E78.00: ICD-10-CM

## 2020-02-18 DIAGNOSIS — Z95.5: ICD-10-CM

## 2020-02-18 DIAGNOSIS — Z79.82: ICD-10-CM

## 2020-02-18 DIAGNOSIS — N39.0: Primary | ICD-10-CM

## 2020-02-18 LAB
ANION GAP SERPL CALC-SCNC: 11 MMOL/L
CHLORIDE SERPL-SCNC: 107 MMOL/L (ref 101–111)
SODIUM SERPL-SCNC: 136 MMOL/L (ref 135–145)

## 2020-02-18 NOTE — EDM.PDOC
<Radu Harrington - Last Filed: 02/18/20 11:57>





ED HPI GENERAL MEDICAL PROBLEM





- General


Chief Complaint: Neurological Problem


Stated Complaint: LEFT SIDE WEAKNESS


Time Seen by Provider: 02/18/20 10:19


Source of Information: Reports: Patient, RN, RN Notes Reviewed


History Limitations: Reports: No Limitations





- History of Present Illness


INITIAL COMMENTS - FREE TEXT/NARRATIVE: 


Flory is brought into the ED by her daughter Flory for complaints of left arm 

weakness and slight left facial drooping, had same symptoms 3 days ago they 

resolved, then this am at 0800 while eating breakfast she lost control of her 

left arm, upon arrival to the ED symptoms subsided. Patient was in for same 

complaint last month and was found to have a UTI. She states today that she 

still as symptoms of a UTI and that it has not resolved.





Onset: Today


Location: Reports: Upper Extremity, Left, Lower Extremity, Left


Severity: Mild


Associated Symptoms: Reports: Weakness.  Denies: Confusion, Fever/Chills, 

Headaches, Nausea/Vomiting, Shortness of Breath, Syncope





- Related Data


 Allergies











Allergy/AdvReac Type Severity Reaction Status Date / Time


 


gemfibrozil [From Lopid] Allergy  Cannot Verified 03/01/19 14:50





   Remember  


 


Penicillins Allergy  Rash Verified 03/01/19 14:50


 


propranolol HCl Allergy  Cannot Verified 03/01/19 14:50





[From Inderal LA]   Remember  


 


codeine AdvReac  Irritabilit Verified 03/01/19 14:50





   y  


 


metformin AdvReac  Shaking Verified 03/01/19 14:50











Home Meds: 


 Home Meds





Insulin Aspart [NovoLOG] 20 unit SQ TID 12/16/14 [History]


Metoprolol Succinate [Toprol XL] 75 mg PO BID 12/16/14 [History]


Aspirin [Lo-Dose Aspirin EC] 81 mg PO DAILY 02/11/19 [History]


Clopidogrel [Plavix] 75 mg PO DAILY 02/11/19 [History]


Ergocalciferol (Vitamin D2) [Vitamin D2] 50,000 unit PO WEEKLY 02/11/19 [History

]


Furosemide 40 mg PO DAILY 02/11/19 [History]


Oxybutynin Chloride [Ditropan Xl] 20 mg PO DAILY 02/11/19 [History]


Sodium Bicarbonate 650 mg PO BID 02/11/19 [History]


atorvaSTATin [Lipitor] 20 mg PO BEDTIME 02/11/19 [History]











Past Medical History


HEENT History: Reports: Impaired Vision


Other HEENT History: wears glasses


Cardiovascular History: Reports: CAD, High Cholesterol, Hypertension, MI, Stents


Respiratory History: Reports: None


Gastrointestinal History: Reports: Chronic Constipation


Genitourinary History: Reports: UTI, Recurrent


OB/GYN History: Reports: Pregnancy


Musculoskeletal History: Reports: Osteoarthritis


Neurological History: Reports: Neuropathy, Diabetic


Psychiatric History: Reports: None


Endocrine/Metabolic History: Reports: Diabetes, Type II, Obesity/BMI 30+


Hematologic History: Reports: Anemia


Immunologic History: Reports: None


Oncologic (Cancer) History: Reports: None


Dermatologic History: Reports: None





- Infectious Disease History


Infectious Disease History: Reports: None





- Past Surgical History


HEENT Surgical History: Reports: Cataract Surgery


Cardiovascular Surgical History: Reports: Coronary Artery Bypass


GI Surgical History: Reports: Hernia Repair/Other


Female  Surgical History: Reports: Other (See Below)


Other Female  Surgeries/Procedures: bladder surgery





Social & Family History





- Family History


Family Medical History: Noncontributory





- Caffeine Use


Caffeine Use: Reports: None





- Living Situation & Occupation


Living situation: Reports: 


Occupation: Retired





ED ROS GENERAL





- Review of Systems


Review Of Systems: See Below


Constitutional: Reports: Weakness.  Denies: Fever, Chills


HEENT: Reports: No Symptoms


Respiratory: Reports: No Symptoms


Cardiovascular: Reports: No Symptoms


Endocrine: Reports: No Symptoms


GI/Abdominal: Reports: No Symptoms


: Reports: No Symptoms


Musculoskeletal: Reports: No Symptoms


Skin: Reports: No Symptoms


Neurological: Reports: Numbness, Tingling (left side), Weakness (left sided).  

Denies: Confusion, Dizziness, Syncope


Psychiatric: Reports: No Symptoms


Hematologic/Lymphatic: Reports: No Symptoms


Immunologic: Reports: No Symptoms





ED EXAM, NEURO





- Physical Exam


Exam: See Below


Exam Limited By: No Limitations


General Appearance: Alert, WD/WN, No Apparent Distress


Eye Exam: Bilateral Eye: EOMI, Normal Inspection, PERRL


Ears: Normal External Exam, Normal Canal, Hearing Grossly Normal, Normal TMs


Nose: Normal Inspection, Normal Mucosa, No Blood


Throat/Mouth: Normal Inspection, Normal Lips, Normal Teeth, Normal Gums, Normal 

Oropharynx, Normal Voice, No Airway Compromise


Head Exam: Atraumatic, Normocephalic


Neck: Normal Inspection, Supple, Non-Tender, Full Range of Motion


Respiratory/Chest: No Respiratory Distress, Lungs Clear, Normal Breath Sounds, 

No Accessory Muscle Use, Chest Non-Tender


Cardiovascular: Normal Peripheral Pulses, Regular Rate, Rhythm, No Edema, No 

Gallop, No JVD, No Murmur, No Rub


GI/Abdominal: Normal Bowel Sounds, Soft, Non-Tender, No Organomegaly, No 

Distention, No Abnormal Bruit, No Mass


 (Female) Exam: Deferred


Rectal (Female) Exam: Deferred


Neurological: Alert, Normal Mood/Affect, Normal Dorsiflexion, CN II-XII Intact, 

Normal Plantar Flexion, Normal Gait, Normal Reflexes, No Motor/Sensory Deficits

, Oriented x 3


Extremities: Normal Inspection, Normal Range of Motion, Non-Tender, No Pedal 

Edema, Normal Capillary Refill


Psychiatric: Normal Affect, Normal Mood


Skin Exam: Warm, Dry, Intact, Normal Color, No Rash





Course





- Vital Signs


Last Recorded V/S: 





 Last Vital Signs











Temp  36.9 C   02/18/20 10:11


 


Pulse  58 L  02/18/20 10:11


 


Resp  18   02/18/20 10:11


 


BP  150/59 H  02/18/20 10:11


 


Pulse Ox  100   02/18/20 10:11














- Orders/Labs/Meds


Orders: 





 Active Orders 24 hr











 Category Date Time Status


 


 EKG Documentation Completion [RC] URGENT Care  02/18/20 09:53 Active


 


 CULTURE URINE [RM] Urgent Lab  02/18/20 11:38 Received











Labs: 





 Laboratory Tests











  02/18/20 02/18/20 02/18/20 Range/Units





  09:57 09:57 10:06 


 


WBC  8.0    (5.0-10.0)  10^3/uL


 


RBC  3.79 L    (4.2-5.4)  10^6/uL


 


Hgb  10.6 L    (12.0-16.0)  g/dL


 


Hct  33.1 L    (37.0-47.0)  %


 


MCV  87.3    ()  fL


 


MCH  28.0    (27.0-34.0)  pg


 


MCHC  32.0 L    (33.0-35.0)  g/dL


 


Plt Count  378    (150-450)  10^3/uL


 


Neut % (Auto)  48.9    (42.2-75.2)  %


 


Lymph % (Auto)  34.0    (20.5-50.1)  %


 


Mono % (Auto)  10.4 H    (2-8)  %


 


Eos % (Auto)  5.9 H    (1.0-3.0)  %


 


Baso % (Auto)  0.8    (0.0-1.0)  %


 


Sodium   136   (135-145)  mmol/L


 


Potassium   4.0   (3.6-5.0)  mmol/L


 


Chloride   107   (101-111)  mmol/L


 


Carbon Dioxide   22.0   (21.0-31.0)  mmol/L


 


Anion Gap   11.0   


 


BUN   28 H   (7-18)  mg/dL


 


Creatinine   1.1   (0.6-1.3)  mg/dL


 


Est Cr Clr Drug Dosing   TNP   


 


Estimated GFR (MDRD)   48   


 


BUN/Creatinine Ratio   25.45   


 


Glucose   120 H   ()  mg/dL


 


POC Glucose    118 H  ()  mg/dl


 


Calcium   8.9   (8.4-10.2)  mg/dl


 


Total Bilirubin   0.6   (0.2-1.0)  mg/dL


 


AST   11   (10-42)  IU/L


 


ALT   9 L   (10-60)  IU/L


 


Alkaline Phosphatase   79   ()  IU/L


 


Troponin I   0.02   (0.00-0.02)  ng/ml


 


Total Protein   7.0   (6.7-8.2)  g/dl


 


Albumin   3.1 L   (3.2-5.5)  g/dl


 


Globulin   3.9   


 


Albumin/Globulin Ratio   0.79   


 


Urine Color     (YELLOW)  


 


Urine Appearance     (CLEAR)  


 


Urine pH     (5.0-9.0)  


 


Ur Specific Gravity     (1.005-1.030)  


 


Urine Protein     (NEGATIVE)  


 


Urine Glucose (UA)     (NEGATIVE)  


 


Urine Ketones     (NEGATIVE)  


 


Urine Occult Blood     (NEGATIVE)  


 


Urine Nitrite     (NEGATIVE)  


 


Urine Bilirubin     (NEGATIVE)  


 


Urine Urobilinogen     (0.2-1.0)  mg/dL


 


Ur Leukocyte Esterase     (NEGATIVE)  


 


Urine RBC     /HPF


 


Urine WBC     (0-5/HPF)  /HPF


 


Ur Epithelial Cells     (NOT SEEN)  /HPF


 


Amorphous Sediment     (NOT SEEN)  /HPF


 


Urine Bacteria     (0-FEW/HPF)  /HPF


 


Urine Mucus     (NOT SEEN)  /LPF














  02/18/20 Range/Units





  11:38 


 


WBC   (5.0-10.0)  10^3/uL


 


RBC   (4.2-5.4)  10^6/uL


 


Hgb   (12.0-16.0)  g/dL


 


Hct   (37.0-47.0)  %


 


MCV   ()  fL


 


MCH   (27.0-34.0)  pg


 


MCHC   (33.0-35.0)  g/dL


 


Plt Count   (150-450)  10^3/uL


 


Neut % (Auto)   (42.2-75.2)  %


 


Lymph % (Auto)   (20.5-50.1)  %


 


Mono % (Auto)   (2-8)  %


 


Eos % (Auto)   (1.0-3.0)  %


 


Baso % (Auto)   (0.0-1.0)  %


 


Sodium   (135-145)  mmol/L


 


Potassium   (3.6-5.0)  mmol/L


 


Chloride   (101-111)  mmol/L


 


Carbon Dioxide   (21.0-31.0)  mmol/L


 


Anion Gap   


 


BUN   (7-18)  mg/dL


 


Creatinine   (0.6-1.3)  mg/dL


 


Est Cr Clr Drug Dosing   


 


Estimated GFR (MDRD)   


 


BUN/Creatinine Ratio   


 


Glucose   ()  mg/dL


 


POC Glucose   ()  mg/dl


 


Calcium   (8.4-10.2)  mg/dl


 


Total Bilirubin   (0.2-1.0)  mg/dL


 


AST   (10-42)  IU/L


 


ALT   (10-60)  IU/L


 


Alkaline Phosphatase   ()  IU/L


 


Troponin I   (0.00-0.02)  ng/ml


 


Total Protein   (6.7-8.2)  g/dl


 


Albumin   (3.2-5.5)  g/dl


 


Globulin   


 


Albumin/Globulin Ratio   


 


Urine Color  Light yellow  (YELLOW)  


 


Urine Appearance  Turbid  (CLEAR)  


 


Urine pH  5.0  (5.0-9.0)  


 


Ur Specific Gravity  1.015  (1.005-1.030)  


 


Urine Protein  >=300 H  (NEGATIVE)  


 


Urine Glucose (UA)  100 H  (NEGATIVE)  


 


Urine Ketones  Negative  (NEGATIVE)  


 


Urine Occult Blood  Moderate H  (NEGATIVE)  


 


Urine Nitrite  Negative  (NEGATIVE)  


 


Urine Bilirubin  Negative  (NEGATIVE)  


 


Urine Urobilinogen  0.2  (0.2-1.0)  mg/dL


 


Ur Leukocyte Esterase  Small H  (NEGATIVE)  


 


Urine RBC  10-20 H  /HPF


 


Urine WBC  >100 H  (0-5/HPF)  /HPF


 


Ur Epithelial Cells  Few  (NOT SEEN)  /HPF


 


Amorphous Sediment  Few  (NOT SEEN)  /HPF


 


Urine Bacteria  Many H  (0-FEW/HPF)  /HPF


 


Urine Mucus  Few H  (NOT SEEN)  /LPF














Departure





- Departure


Time of Disposition: 12:15


Disposition: Home, Self-Care 01


Condition: Good


Clinical Impression: 


UTI (urinary tract infection)


Qualifiers:


 Urinary tract infection type: site unspecified Hematuria presence: without 

hematuria Qualified Code(s): N39.0 - Urinary tract infection, site not specified








- Discharge Information


*PRESCRIPTION DRUG MONITORING PROGRAM REVIEWED*: Not Applicable


*COPY OF PRESCRIPTION DRUG MONITORING REPORT IN PATIENT GUNJAN: Not Applicable


Instructions:  Altered Mental Status, Urinary Tract Infection, Adult, 

Dehydration, Elderly, Easy-to-Read


Forms:  ED Department Discharge


Additional Instructions: 


Rx: Keflex 500 mg


Take the antibiotic Keflex as directed for urinary tract infection. Increase 

water intake to stay hydrated and to help with the UTI. Follow-up in clinic 

next week to have your urine retested to make sure the antibiotic is working or 

if a new antibiotic is needed at that time. 





Sepsis Event Note





- Focused Exam


Vital Signs: 





 Vital Signs











  Temp Pulse Resp BP Pulse Ox


 


 02/18/20 10:11  36.9 C  58 L  18  150/59 H  100











Date Exam was Performed: 02/18/20


Time Exam was Performed: 11:57





- My Orders


Last 24 Hours: 





My Active Orders





02/18/20 09:53


EKG Documentation Completion [RC] URGENT 





02/18/20 11:38


CULTURE URINE [RM] Urgent 














- Assessment/Plan


Last 24 Hours: 





My Active Orders





02/18/20 09:53


EKG Documentation Completion [RC] URGENT 





02/18/20 11:38


CULTURE URINE [RM] Urgent 














<Taiwo Yang - Last Filed: 02/18/20 12:09>





Course





- Re-Assessments/Exams


Free Text/Narrative Re-Assessment/Exam: 





02/18/20 12:08


I have examined the patient. I have discussed findings and treatment plan with 

the PA student. I agree with the assessment and plan in the following students 

note.





Sepsis Event Note





- Focused Exam


Date Exam was Performed: 02/18/20


Time Exam was Performed: 12:08

## 2020-07-30 ENCOUNTER — HOSPITAL ENCOUNTER (EMERGENCY)
Dept: HOSPITAL 43 - DL.ED | Age: 75
Discharge: NURSING, REHAB OR LONG-TERM CARE | End: 2020-07-30
Payer: MEDICARE

## 2020-07-30 VITALS — DIASTOLIC BLOOD PRESSURE: 64 MMHG | HEART RATE: 58 BPM | SYSTOLIC BLOOD PRESSURE: 130 MMHG

## 2020-07-30 DIAGNOSIS — Z88.0: ICD-10-CM

## 2020-07-30 DIAGNOSIS — Z88.5: ICD-10-CM

## 2020-07-30 DIAGNOSIS — E11.40: ICD-10-CM

## 2020-07-30 DIAGNOSIS — M19.90: ICD-10-CM

## 2020-07-30 DIAGNOSIS — E66.9: ICD-10-CM

## 2020-07-30 DIAGNOSIS — E78.00: ICD-10-CM

## 2020-07-30 DIAGNOSIS — E87.5: ICD-10-CM

## 2020-07-30 DIAGNOSIS — N17.9: ICD-10-CM

## 2020-07-30 DIAGNOSIS — E87.1: ICD-10-CM

## 2020-07-30 DIAGNOSIS — I25.10: ICD-10-CM

## 2020-07-30 DIAGNOSIS — Z79.82: ICD-10-CM

## 2020-07-30 DIAGNOSIS — Z79.899: ICD-10-CM

## 2020-07-30 DIAGNOSIS — Z79.02: ICD-10-CM

## 2020-07-30 DIAGNOSIS — Z79.4: ICD-10-CM

## 2020-07-30 DIAGNOSIS — Z88.8: ICD-10-CM

## 2020-07-30 DIAGNOSIS — N39.0: Primary | ICD-10-CM

## 2020-07-30 DIAGNOSIS — I10: ICD-10-CM

## 2020-07-30 LAB
ANION GAP SERPL CALC-SCNC: 23.2 MEQ/L (ref 7–13)
CHLORIDE SERPL-SCNC: 99 MMOL/L (ref 98–107)
SODIUM SERPL-SCNC: 128 MMOL/L (ref 136–145)

## 2020-07-30 PROCEDURE — 99285 EMERGENCY DEPT VISIT HI MDM: CPT

## 2020-07-30 PROCEDURE — 99283 EMERGENCY DEPT VISIT LOW MDM: CPT

## 2020-07-30 PROCEDURE — 36415 COLL VENOUS BLD VENIPUNCTURE: CPT

## 2020-07-30 PROCEDURE — 83605 ASSAY OF LACTIC ACID: CPT

## 2020-07-30 PROCEDURE — 85025 COMPLETE CBC W/AUTO DIFF WBC: CPT

## 2020-07-30 PROCEDURE — 80053 COMPREHEN METABOLIC PANEL: CPT

## 2020-07-30 PROCEDURE — 87040 BLOOD CULTURE FOR BACTERIA: CPT

## 2020-07-30 PROCEDURE — 93005 ELECTROCARDIOGRAM TRACING: CPT

## 2020-07-30 NOTE — EDM.PDOC
ED HPI GENERAL MEDICAL PROBLEM





- General


Chief Complaint: Genitourinary Problem


Stated Complaint: UTI/BACK PAIN


Time Seen by Provider: 07/30/20 15:45


Source of Information: Reports: Patient


History Limitations: Reports: No Limitations





- History of Present Illness


INITIAL COMMENTS - FREE TEXT/NARRATIVE: 





This 73 yo female patient reports to the ED with a 2 week history of right sided

back pain that has been getting worse. The patient has a history of 

pylonephritis and frequent urinary tract infections. The patient reported to the

[facility] due to her UTI symptoms. The patient was sent to the ED for 

continued evaluation and treatment. 


Duration: Week(s):, Constant, Getting Worse


Location: Reports: Back (right flank)


Quality: Reports: Ache, Dull


Severity: Moderate


Improves with: Reports: None


Worsens with: Reports: None


Context: Reports: Other


Associated Symptoms: Reports: No Other Symptoms


  ** Right Upper Back


Pain Score (Numeric/FACES): 8





- Related Data


                                    Allergies











Allergy/AdvReac Type Severity Reaction Status Date / Time


 


gemfibrozil [From Lopid] Allergy  Cannot Verified 07/30/20 15:43





   Remember  


 


Penicillins Allergy  Rash Verified 07/30/20 15:43


 


propranolol HCl Allergy  Cannot Verified 07/30/20 15:43





[From Inderal LA]   Remember  


 


codeine AdvReac  Irritabilit Verified 07/30/20 15:43





   y  


 


metformin AdvReac  Shaking Verified 07/30/20 15:43











Home Meds: 


                                    Home Meds





Insulin Aspart [NovoLOG] 20 unit SQ TID 12/16/14 [History]


Metoprolol Succinate [Toprol XL] 75 mg PO BID 12/16/14 [History]


Aspirin [Lo-Dose Aspirin EC] 81 mg PO DAILY 02/11/19 [History]


Clopidogrel [Plavix] 75 mg PO DAILY 02/11/19 [History]


Ergocalciferol (Vitamin D2) [Vitamin D2] 50,000 unit PO WEEKLY 02/11/19 

[History]


Furosemide 40 mg PO DAILY 02/11/19 [History]


Oxybutynin Chloride [Ditropan Xl] 20 mg PO DAILY 02/11/19 [History]


Sodium Bicarbonate 650 mg PO BID 02/11/19 [History]


atorvaSTATin [Lipitor] 20 mg PO BEDTIME 02/11/19 [History]











Past Medical History


HEENT History: Reports: Impaired Vision


Other HEENT History: wears glasses


Cardiovascular History: Reports: CAD, High Cholesterol, Hypertension, MI, Stents


Respiratory History: Reports: None


Gastrointestinal History: Reports: Chronic Constipation


Genitourinary History: Reports: UTI, Recurrent


OB/GYN History: Reports: Pregnancy


Musculoskeletal History: Reports: Osteoarthritis


Neurological History: Reports: Neuropathy, Diabetic


Psychiatric History: Reports: None


Endocrine/Metabolic History: Reports: Diabetes, Type II, Obesity/BMI 30+


Hematologic History: Reports: Anemia


Immunologic History: Reports: None


Oncologic (Cancer) History: Reports: None


Dermatologic History: Reports: None





- Infectious Disease History


Infectious Disease History: Reports: None





- Past Surgical History


HEENT Surgical History: Reports: Cataract Surgery


Cardiovascular Surgical History: Reports: Coronary Artery Bypass


GI Surgical History: Reports: Hernia Repair/Other


Female  Surgical History: Reports: Other (See Below)


Other Female  Surgeries/Procedures: bladder surgery





Social & Family History





- Family History


Family Medical History: Noncontributory





- Tobacco Use


Smoking Status *Q: Never Smoker


Second Hand Smoke Exposure: No





- Caffeine Use


Caffeine Use: Reports: None





- Recreational Drug Use


Recreational Drug Use: No





- Living Situation & Occupation


Living situation: Reports: 


Occupation: Retired





ED ROS GENERAL





- Review of Systems


Review Of Systems: Comprehensive ROS is negative, except as noted in HPI.





ED EXAM, RENAL/





- Physical Exam


Exam: See Below


Exam Limited By: No Limitations


General Appearance: WD/WN, Moderate Distress


Eye Exam: Bilateral Eye: EOMI, Normal Inspection, PERRL


Ears: Normal External Exam, Normal Canal, Hearing Grossly Normal, Normal TMs


Nose: Normal Inspection, Normal Mucosa, No Blood


Throat/Mouth: Normal Inspection, Normal Lips, Normal Teeth, Normal Gums, Normal 

Oropharynx, Normal Voice, No Airway Compromise


Head: Atraumatic, Normocephalic


Neck: Normal Inspection, Supple, Non-Tender, Full Range of Motion


Respiratory/Chest: No Respiratory Distress, Lungs Clear, Normal Breath Sounds, 

No Accessory Muscle Use, Chest Non-Tender


Cardiovascular: Normal Peripheral Pulses, Regular Rate, Rhythm, No Edema, No 

Gallop, No JVD, No Murmur, No Rub


GI/Abdominal: Normal Bowel Sounds, Soft, Non-Tender, No Organomegaly, No 

Distention, No Abnormal Bruit, No Mass


 (Female) Exam: Deferred


Rectal (Female) Exam: Deferred


Back Exam: CVA Tenderness (R)


Extremities: Normal Inspection, Normal Range of Motion, Non-Tender, Normal 

Capillary Refill, No Pedal Edema


Neurological: Alert, Oriented, CN II-XII Intact, Normal Cognition, Normal Gait, 

Normal Reflexes, No Motor/Sensory Deficits


Psychiatric: Normal Affect, Normal Mood


Skin Exam: Warm, Dry, Intact, Normal Color, No Rash


Lymphatic: No Adenopathy





Course





- Vital Signs


Last Recorded V/S: 


                                Last Vital Signs











Temp  36.3 C   07/30/20 15:38


 


Pulse  58 L  07/30/20 15:38


 


Resp  16   07/30/20 15:38


 


BP  130/64   07/30/20 15:38


 


Pulse Ox  100   07/30/20 15:38














- Orders/Labs/Meds


Orders: 


                               Active Orders 24 hr











 Category Date Time Status


 


 EKG Documentation Completion [RC] STAT Care  07/30/20 16:51 Active


 


 CULTURE BLOOD [BC] Stat Lab  07/30/20 15:50 Received


 


 UA RFX SUZETTE AND CULT IF INDIC [URIN] Urgent Lab  07/30/20 15:59 Ordered


 


 Sodium Chloride 0.9% [Normal Saline] 1,000 ml Med  07/30/20 17:45 Ordered





 IV ASDIRECTED   


 


 cefTRIAXone [Rocephin] 1 gm Med  07/30/20 17:37 Ordered





 Sodium Chloride 0.9% [Normal Saline] 50 ml   





 IV ONETIME   








                                Medication Orders





Ceftriaxone Sodium 1 gm/ (Sodium Chloride)  50 mls @ 100 mls/hr IV ONETIME ONE


   Stop: 07/30/20 18:06


Sodium Chloride (Normal Saline)  1,000 mls @ 500 mls/hr IV ASDIRECTED [facility]








Labs: 


                                Laboratory Tests











  07/30/20 07/30/20 07/30/20 Range/Units





  15:50 15:50 15:50 


 


WBC  15.5 H    (5.0-10.0)  10^3/uL


 


RBC  4.31    (4.2-5.4)  10^6/uL


 


Hgb  12.2  D    (12.0-16.0)  g/dL


 


Hct  36.6 L    (37.0-47.0)  %


 


MCV  84.9    ()  fL


 


MCH  28.3    (27.0-34.0)  pg


 


MCHC  33.3    (33.0-35.0)  g/dL


 


Plt Count  546 H D    (150-450)  10^3/uL


 


Neut % (Auto)  82.2 H    (42.2-75.2)  %


 


Lymph % (Auto)  12.1 L    (20.5-50.1)  %


 


Mono % (Auto)  5.5    (2-8)  %


 


Eos % (Auto)  0.1 L    (1.0-3.0)  %


 


Baso % (Auto)  0.1    (0.0-1.0)  %


 


Sodium   128 L   (136-145)  mmol/L


 


Potassium   6.2 H   (3.5-5.1)  mmol/L


 


Chloride   99   ()  mmol/L


 


Carbon Dioxide   12 L   (21-32)  mmol/L


 


Anion Gap   23.2 H   (7-13)  mEq/L


 


BUN   133 H   (7-18)  mg/dL


 


Creatinine   3.41 H   (0.55-1.02)  mg/dL


 


Est Cr Clr Drug Dosing   11.79   mL/min


 


Estimated GFR (MDRD)   13   


 


BUN/Creatinine Ratio   39.0   (No establ ref range)  


 


Glucose   256 H   (74-99)  mg/dL


 


Lactic Acid    1.4  (0.4-2.0)  mmol/L


 


Calcium   9.1   (8.5-10.1)  mg/dL


 


Total Bilirubin   0.3   (0.2-1.0)  mg/dL


 


AST   9 L   (15-37)  U/L


 


ALT   11 L   (14-59)  U/L


 


Alkaline Phosphatase   127 H   ()  U/L


 


Total Protein   8.7 H   (6.4-8.2)  g/dL


 


Albumin   2.9 L   (3.4-5.0)  g/dL


 


Globulin   5.8   


 


Albumin/Globulin Ratio   0.50   











Meds: 


Medications











Generic Name Dose Route Start Last Admin





  Trade Name Freq  PRN Reason Stop Dose Admin


 


Ceftriaxone Sodium 1 gm/  50 mls @ 100 mls/hr  07/30/20 17:37 





  Sodium Chloride  IV  07/30/20 18:06 





  ONETIME ONE  


 


Sodium Chloride  1,000 mls @ 500 mls/hr  07/30/20 17:45 





  Normal Saline  IV  





  ASDIRECTED [facility]  














Departure





- Departure


Time of Disposition: 17:43


Disposition: DC/Tfer to Acute Hospital 02


Condition: Serious


Clinical Impression: 


 Hyperkalemia, Hyponatremia





Acute renal failure (ARF)


Qualifiers:


 Acute renal failure type: unspecified Qualified Code(s): N17.9 - Acute kidney 

failure, unspecified





Urinary tract infection


Qualifiers:


 Urinary tract infection type: site unspecified Hematuria presence: without 

hematuria Qualified Code(s): N39.0 - Urinary tract infection, site not specified








- Discharge Information


*PRESCRIPTION DRUG MONITORING PROGRAM REVIEWED*: Not Applicable


*COPY OF PRESCRIPTION DRUG MONITORING REPORT IN PATIENT GUNJAN: Not Applicable


Forms:  Interfacility Transfer EMTALA


Care Plan Goals: 


Discussed the patient's history, examination, lab results and treatments with 

Dr. Lou ([facility]). Dr. Lou accepted the patient for continued 

evaluation and further management as an inpatient at [facility] in [place]. The 

patient will be transported by [facility]. 





Sepsis Event Note (ED)





- Evaluation


Sepsis Screening Result: No Definite Risk





- Focused Exam


Vital Signs: 


                                   Vital Signs











  Temp Pulse Resp BP Pulse Ox


 


 07/30/20 15:38  36.3 C  58 L  16  130/64  100














- My Orders


Last 24 Hours: 


My Active Orders





07/30/20 15:50


CULTURE BLOOD [BC] Stat 





07/30/20 15:59


UA RFX SUZETTE AND CULT IF INDIC [URIN] Urgent 





07/30/20 16:51


EKG Documentation Completion [RC] STAT 





07/30/20 17:37


cefTRIAXone [Rocephin] 1 gm   Sodium Chloride 0.9% [Normal Saline] 50 ml IV 

ONETIME 





07/30/20 17:45


Sodium Chloride 0.9% [Normal Saline] 1,000 ml IV ASDIRECTED 














- Assessment/Plan


Last 24 Hours: 


My Active Orders





07/30/20 15:50


CULTURE BLOOD [BC] Stat 





07/30/20 15:59


UA RFX SUZETTE AND CULT IF INDIC [URIN] Urgent 





07/30/20 16:51


EKG Documentation Completion [RC] STAT 





07/30/20 17:37


cefTRIAXone [Rocephin] 1 gm   Sodium Chloride 0.9% [Normal Saline] 50 ml IV 

ONETIME 





07/30/20 17:45


Sodium Chloride 0.9% [Normal Saline] 1,000 ml IV ASDIRECTED

## 2020-08-17 ENCOUNTER — HOSPITAL ENCOUNTER (EMERGENCY)
Dept: HOSPITAL 43 - DL.ED | Age: 75
Discharge: NURSING, REHAB OR LONG-TERM CARE | End: 2020-08-17
Payer: MEDICARE

## 2020-08-17 VITALS — DIASTOLIC BLOOD PRESSURE: 88 MMHG | SYSTOLIC BLOOD PRESSURE: 151 MMHG | HEART RATE: 79 BPM

## 2020-08-17 DIAGNOSIS — Z88.5: ICD-10-CM

## 2020-08-17 DIAGNOSIS — Z88.8: ICD-10-CM

## 2020-08-17 DIAGNOSIS — E78.00: ICD-10-CM

## 2020-08-17 DIAGNOSIS — D64.9: Primary | ICD-10-CM

## 2020-08-17 DIAGNOSIS — Z79.4: ICD-10-CM

## 2020-08-17 DIAGNOSIS — Z79.82: ICD-10-CM

## 2020-08-17 DIAGNOSIS — E11.40: ICD-10-CM

## 2020-08-17 DIAGNOSIS — I10: ICD-10-CM

## 2020-08-17 DIAGNOSIS — I25.2: ICD-10-CM

## 2020-08-17 DIAGNOSIS — Z79.02: ICD-10-CM

## 2020-08-17 DIAGNOSIS — Z95.5: ICD-10-CM

## 2020-08-17 DIAGNOSIS — R31.0: ICD-10-CM

## 2020-08-17 DIAGNOSIS — I25.10: ICD-10-CM

## 2020-08-17 DIAGNOSIS — E66.9: ICD-10-CM

## 2020-08-17 DIAGNOSIS — Z88.0: ICD-10-CM

## 2020-08-17 DIAGNOSIS — M19.90: ICD-10-CM

## 2020-08-17 LAB
ANION GAP SERPL CALC-SCNC: 14.8 MEQ/L (ref 7–13)
CHLORIDE SERPL-SCNC: 102 MMOL/L (ref 98–107)
SODIUM SERPL-SCNC: 132 MMOL/L (ref 136–145)

## 2020-08-17 NOTE — EDM.PDOC
ED HPI GENERAL MEDICAL PROBLEM





- General


Chief Complaint: Genitourinary Problem


Stated Complaint: INFECTION


Time Seen by Provider: 08/17/20 12:45


Source of Information: Reports: Patient


History Limitations: Reports: No Limitations





- History of Present Illness


INITIAL COMMENTS - FREE TEXT/NARRATIVE: 





This 75 yo female patient reports to the ED with diffuse abdominal pain, vaginal

itching/burning and bleeding. The patient was seen for a UTI on 7/30/20 and sent

to [facility] for acute renal failure and a UTI. The patient did have a 

follow-up after discharge and the patient reports that visit went fine. The 

patient reports she started to notice some diffuse abdominal pain and bleeding 

that started this morning. The patient does not know where the blood is coming 

from, but feels the frequent need to urinate. 


Onset: Today


Duration: Constant


Location: Reports: Abdomen


Quality: Reports: Ache, Dull


Severity: Moderate


Improves with: Reports: None


Worsens with: Reports: None


Context: Reports: Other


Associated Symptoms: Reports: No Other Symptoms


  ** Vaginal


Pain Score (Numeric/FACES): 7





- Related Data


                                    Allergies











Allergy/AdvReac Type Severity Reaction Status Date / Time


 


gemfibrozil [From Lopid] Allergy  Cannot Verified 08/17/20 10:33





   Remember  


 


Penicillins Allergy  Rash Verified 08/17/20 10:33


 


propranolol HCl Allergy  Cannot Verified 08/17/20 10:33





[From Inderal LA]   Remember  


 


codeine AdvReac  Irritabilit Verified 08/17/20 10:33





   y  


 


metformin AdvReac  Shaking Verified 08/17/20 10:33











Home Meds: 


                                    Home Meds





Insulin Aspart [NovoLOG] 20 unit SQ TID 12/16/14 [History]


Metoprolol Succinate [Toprol XL] 75 mg PO BID 12/16/14 [History]


Aspirin [Lo-Dose Aspirin EC] 81 mg PO DAILY 02/11/19 [History]


Clopidogrel [Plavix] 75 mg PO DAILY 02/11/19 [History]


Ergocalciferol (Vitamin D2) [Vitamin D2] 50,000 unit PO WEEKLY 02/11/19 

[History]


Furosemide 40 mg PO DAILY 02/11/19 [History]


Oxybutynin Chloride [Ditropan Xl] 20 mg PO DAILY 02/11/19 [History]


Sodium Bicarbonate 650 mg PO BID 02/11/19 [History]


atorvaSTATin [Lipitor] 20 mg PO BEDTIME 02/11/19 [History]











Past Medical History


HEENT History: Reports: Impaired Vision


Other HEENT History: wears glasses


Cardiovascular History: Reports: CAD, High Cholesterol, Hypertension, MI, Stents


Respiratory History: Reports: None


Gastrointestinal History: Reports: Chronic Constipation


Genitourinary History: Reports: UTI, Recurrent


OB/GYN History: Reports: Pregnancy


Musculoskeletal History: Reports: Osteoarthritis


Neurological History: Reports: Neuropathy, Diabetic


Psychiatric History: Reports: None


Endocrine/Metabolic History: Reports: Diabetes, Type II, Obesity/BMI 30+


Hematologic History: Reports: Anemia


Immunologic History: Reports: None


Oncologic (Cancer) History: Reports: None


Dermatologic History: Reports: None





- Infectious Disease History


Infectious Disease History: Reports: None





- Past Surgical History


Head Surgeries/Procedures: Reports: None


HEENT Surgical History: Reports: Cataract Surgery


Cardiovascular Surgical History: Reports: Coronary Artery Bypass


GI Surgical History: Reports: Hernia Repair/Other


Female  Surgical History: Reports: Other (See Below)


Other Female  Surgeries/Procedures: bladder surgery





Social & Family History





- Family History


Family Medical History: Noncontributory





- Tobacco Use


Smoking Status *Q: Never Smoker





- Caffeine Use


Caffeine Use: Reports: Soda





- Recreational Drug Use


Recreational Drug Use: No





- Living Situation & Occupation


Living situation: Reports: 


Occupation: Retired





ED ROS GENERAL





- Review of Systems


Review Of Systems: Comprehensive ROS is negative, except as noted in HPI.





ED EXAM, GI/ABD





- Physical Exam


Exam: See Below


Exam Limited By: No Limitations


General Appearance: Alert, WD/WN, Mild Distress


Eyes: Bilateral: Normal Appearance, EOMI


Ears: Normal External Exam, Normal Canal, Hearing Grossly Normal, Normal TMs


Nose: Normal Inspection, Normal Mucosa, No Blood


Throat/Mouth: Normal Inspection, Normal Lips, Normal Teeth, Normal Gums, Normal 

Oropharynx, Normal Voice, No Airway Compromise


Head: Atraumatic, Normocephalic


Neck: Normal Inspection, Supple, Non-Tender, Full Range of Motion


Respiratory/Chest: No Respiratory Distress, Lungs Clear, Normal Breath Sounds, 

No Accessory Muscle Use, Chest Non-Tender


Cardiovascular: Normal Peripheral Pulses, Regular Rate, Rhythm, No Edema, No 

Gallop, No JVD, No Murmur, No Rub


GI/Abdominal Exam: Normal Bowel Sounds, Soft, No Organomegaly, No Distention, No

 Abnormal Bruit, No Mass, Pelvis Stable, Tender (diffuse)


Rectal (Female) Exam: Normal Rectal Tone, Heme + Stool, Hemorrhoids


Back Exam: Normal Inspection, Full Range of Motion, NT


Extremities: Normal Inspection, Normal Range of Motion, Non-Tender, Normal 

Capillary Refill, No Pedal Edema


Neurological: Alert, Oriented, CN II-XII Intact, Normal Cognition, Normal Gait, 

Normal Reflexes, No Motor/Sensory Deficits


Psychiatric: Normal Affect, Normal Mood


Skin Exam: Warm, Dry, Intact, Normal Color, No Rash


Lymphatic: No Adenopathy





Course





- Vital Signs


Last Recorded V/S: 


                                Last Vital Signs











Temp  36.0 C L  08/17/20 10:26


 


Pulse  57 L  08/17/20 10:26


 


Resp  16   08/17/20 10:26


 


BP  115/61   08/17/20 10:26


 


Pulse Ox  100   08/17/20 10:26














- Orders/Labs/Meds


Orders: 


                               Active Orders 24 hr











 Category Date Time Status


 


 UA RFX SUZETTE AND CULT IF INDIC [URIN] Stat Lab  08/17/20 10:47 Ordered











Labs: 


                                Laboratory Tests











  08/17/20 08/17/20 Range/Units





  11:50 11:50 


 


WBC  7.7   (5.0-10.0)  10^3/uL


 


RBC  3.05 L   (4.2-5.4)  10^6/uL


 


Hgb  8.5 L D   (12.0-16.0)  g/dL


 


Hct  27.1 L   (37.0-47.0)  %


 


MCV  88.9  D   ()  fL


 


MCH  27.9   (27.0-34.0)  pg


 


MCHC  31.4 L   (33.0-35.0)  g/dL


 


Plt Count  404  D   (150-450)  10^3/uL


 


Neut % (Auto)  56.0   (42.2-75.2)  %


 


Lymph % (Auto)  27.8   (20.5-50.1)  %


 


Mono % (Auto)  11.1 H   (2-8)  %


 


Eos % (Auto)  4.3 H   (1.0-3.0)  %


 


Baso % (Auto)  0.8   (0.0-1.0)  %


 


Sodium   132 L  (136-145)  mmol/L


 


Potassium   4.8  (3.5-5.1)  mmol/L


 


Chloride   102  ()  mmol/L


 


Carbon Dioxide   20 L  (21-32)  mmol/L


 


Anion Gap   14.8 H  (7-13)  mEq/L


 


BUN   45 H D  (7-18)  mg/dL


 


Creatinine   2.30 H  (0.55-1.02)  mg/dL


 


Est Cr Clr Drug Dosing   16.71  mL/min


 


Estimated GFR (MDRD)   21  


 


BUN/Creatinine Ratio   19.6  (No establ ref range)  


 


Glucose   108 H  (74-99)  mg/dL


 


Calcium   8.2 L  (8.5-10.1)  mg/dL


 


Total Bilirubin   0.3  (0.2-1.0)  mg/dL


 


AST   11 L  (15-37)  U/L


 


ALT   10 L  (14-59)  U/L


 


Alkaline Phosphatase   123 H  ()  U/L


 


Total Protein   6.5  (6.4-8.2)  g/dL


 


Albumin   2.2 L  (3.4-5.0)  g/dL


 


Globulin   4.3  


 


Albumin/Globulin Ratio   0.51  














Departure





- Departure


Time of Disposition: 17:48


Disposition: DC/Tfer to [facility]


Condition: Fair


Clinical Impression: 


 Gross hematuria





Anemia


Qualifiers:


 Anemia type: unspecified type Qualified Code(s): D64.9 - Anemia, unspecified








- Discharge Information


*PRESCRIPTION DRUG MONITORING PROGRAM REVIEWED*: Not Applicable


*COPY OF PRESCRIPTION DRUG MONITORING REPORT IN PATIENT GUNJAN: Not Applicable


Forms:  Interfacility Transfer EMTALA


Care Plan Goals: 


Discussed the patient's history, examination, lab and treatments with Dr. Motley. Dr. Motley accepted the patient for continued evaluation and 

further management as an observation patient at [facility] in [place]. The 

patient will be transported by [facility]. 





Sepsis Event Note (ED)





- Evaluation


Sepsis Screening Result: No Definite Risk





- Focused Exam


Vital Signs: 


                                   Vital Signs











  Temp Pulse Resp BP Pulse Ox


 


 08/17/20 10:26  36.0 C L  57 L  16  115/61  100














- My Orders


Last 24 Hours: 


My Active Orders





08/17/20 10:47


UA RFX SUZETTE AND CULT IF INDIC [URIN] Stat 














- Assessment/Plan


Last 24 Hours: 


My Active Orders





08/17/20 10:47


UA RFX SUZETTE AND CULT IF INDIC [URIN] Stat

## 2020-11-07 ENCOUNTER — HOSPITAL ENCOUNTER (EMERGENCY)
Dept: HOSPITAL 43 - DL.ED | Age: 75
Discharge: HOME | End: 2020-11-07
Payer: MEDICARE

## 2020-11-07 VITALS — SYSTOLIC BLOOD PRESSURE: 158 MMHG | DIASTOLIC BLOOD PRESSURE: 69 MMHG | HEART RATE: 66 BPM

## 2020-11-07 DIAGNOSIS — E78.00: ICD-10-CM

## 2020-11-07 DIAGNOSIS — Z79.899: ICD-10-CM

## 2020-11-07 DIAGNOSIS — Z79.82: ICD-10-CM

## 2020-11-07 DIAGNOSIS — N39.0: ICD-10-CM

## 2020-11-07 DIAGNOSIS — Z79.02: ICD-10-CM

## 2020-11-07 DIAGNOSIS — T83.512A: Primary | ICD-10-CM

## 2020-11-07 DIAGNOSIS — Z88.5: ICD-10-CM

## 2020-11-07 DIAGNOSIS — E66.9: ICD-10-CM

## 2020-11-07 DIAGNOSIS — Z95.5: ICD-10-CM

## 2020-11-07 DIAGNOSIS — Z88.8: ICD-10-CM

## 2020-11-07 DIAGNOSIS — Z79.4: ICD-10-CM

## 2020-11-07 DIAGNOSIS — E11.40: ICD-10-CM

## 2020-11-07 DIAGNOSIS — I10: ICD-10-CM

## 2020-11-07 DIAGNOSIS — I25.2: ICD-10-CM

## 2020-11-07 DIAGNOSIS — Z88.0: ICD-10-CM

## 2020-11-07 LAB
ANION GAP SERPL CALC-SCNC: 9.1 MEQ/L
CHLORIDE SERPL-SCNC: 95 MMOL/L
SODIUM SERPL-SCNC: 134 MMOL/L

## 2020-11-07 NOTE — EDM.PDOC
ED HPI GENERAL MEDICAL PROBLEM





- General


Chief Complaint: Genitourinary Problem


Stated Complaint: IRRITATION AFTER PROCEDURE 11/3/20


Time Seen by Provider: 11/07/20 09:25


Source of Information: Reports: Patient, Old Records, RN, RN Notes Reviewed


History Limitations: Reports: No Limitations





- History of Present Illness


INITIAL COMMENTS - FREE TEXT/NARRATIVE: 


Pt had nephrostomy tube place left side two days ago in [place]. She states that she 

and her daughter are concerned that the right kidney is not making urine because

there wasn't much in the Rodriguez catheter this morning. In the ER now the Rodriguez is

draining some clear yellow urine but not as much as the nephrostomy, which 

contains dark steve urine. Denies fever or chills. Was COVID tested 2 weeks ago 

and was negative and denies any cough, sob, nausea, vomiting , diarrhea, or 

malaise. 





Onset: Today


Location: Reports: Other (Urinary)


Quality: Reports: Other (Denies pain)


Associated Symptoms: Reports: No Other Symptoms





- Related Data


                                    Allergies











Allergy/AdvReac Type Severity Reaction Status Date / Time


 


gemfibrozil [From Lopid] Allergy  Cannot Verified 11/07/20 09:23





   Remember  


 


Penicillins Allergy  Rash Verified 11/07/20 09:23


 


propranolol HCl Allergy  Cannot Verified 11/07/20 09:23





[From Inderal LA]   Remember  


 


codeine AdvReac  Irritabilit Verified 11/07/20 09:23





   y  


 


metformin AdvReac  Shaking Verified 11/07/20 09:23











Home Meds: 


                                    Home Meds





Insulin Aspart [NovoLOG] 20 unit SQ TID 12/16/14 [History]


Metoprolol Succinate [Toprol XL] 75 mg PO BID 12/16/14 [History]


Aspirin [Lo-Dose Aspirin EC] 81 mg PO DAILY 02/11/19 [History]


Clopidogrel [Plavix] 75 mg PO DAILY 02/11/19 [History]


Ergocalciferol (Vitamin D2) [Vitamin D2] 50,000 unit PO WEEKLY 02/11/19 

[History]


Furosemide 40 mg PO DAILY 02/11/19 [History]


Oxybutynin Chloride [Ditropan Xl] 20 mg PO DAILY 02/11/19 [History]


Sodium Bicarbonate 650 mg PO BID 02/11/19 [History]


atorvaSTATin [Lipitor] 20 mg PO BEDTIME 02/11/19 [History]











Past Medical History


HEENT History: Reports: Impaired Vision


Other HEENT History: wears glasses


Cardiovascular History: Reports: CAD, High Cholesterol, Hypertension, MI, Stents


Respiratory History: Reports: None


Gastrointestinal History: Reports: Chronic Constipation


Genitourinary History: Reports: UTI, Recurrent


OB/GYN History: Reports: Pregnancy


Musculoskeletal History: Reports: Osteoarthritis


Neurological History: Reports: Neuropathy, Diabetic


Psychiatric History: Reports: None


Endocrine/Metabolic History: Reports: Diabetes, Type II, Obesity/BMI 30+


Hematologic History: Reports: Anemia


Immunologic History: Reports: None


Oncologic (Cancer) History: Reports: None


Dermatologic History: Reports: None





- Infectious Disease History


Infectious Disease History: Reports: None





- Past Surgical History


Head Surgeries/Procedures: Reports: None


HEENT Surgical History: Reports: Cataract Surgery


Cardiovascular Surgical History: Reports: Coronary Artery Bypass


GI Surgical History: Reports: Hernia Repair/Other


Female  Surgical History: Reports: Other (See Below)


Other Female  Surgeries/Procedures: bladder surgery





Social & Family History





- Family History


Family Medical History: Noncontributory





- Tobacco Use


Tobacco Use Status *Q: Never Tobacco User





- Caffeine Use


Caffeine Use: Reports: Soda





- Recreational Drug Use


Recreational Drug Use: No





- Living Situation & Occupation


Living situation: Reports: 


Occupation: Retired





ED ROS GENERAL





- Review of Systems


Review Of Systems: Comprehensive ROS is negative, except as noted in HPI.





ED EXAM, RENAL/





- Physical Exam


Exam: See Below


Exam Limited By: No Limitations


General Appearance: Alert, WD/WN, No Apparent Distress


Head: Atraumatic, Normocephalic


Neck: Normal Inspection


Respiratory/Chest: No Respiratory Distress, Lungs Clear, Normal Breath Sounds, 

No Accessory Muscle Use, Chest Non-Tender


Cardiovascular: Regular Rate, Rhythm


GI/Abdominal: Normal Bowel Sounds, Soft, Non-Tender, No Organomegaly, No 

Distention


Back Exam: Normal Inspection, Other (Left nephrostomy tube with dark steve urine

 in bag, no visible blood, not cloudy).  No: CVA Tenderness (L), CVA Tenderness 

(R)


Extremities: Normal Inspection


Neurological: Alert, Oriented, No Motor/Sensory Deficits


Psychiatric: Normal Mood


Skin Exam: Warm, Dry, Intact, Normal Color, No Rash





Course





- Vital Signs


Last Recorded V/S: 


                                Last Vital Signs











Temp  98.3 F   11/07/20 09:21


 


Pulse  66   11/07/20 09:21


 


Resp  14   11/07/20 09:21


 


BP  158/69 H  11/07/20 09:21


 


Pulse Ox  97   11/07/20 09:21














- Orders/Labs/Meds


Orders: 


                               Active Orders 24 hr











 Category Date Time Status


 


 CULTURE URINE [RM] Stat Lab  11/07/20 09:40 Received











Labs: 


                                Laboratory Tests











  11/07/20 11/07/20 11/07/20 Range/Units





  09:40 09:47 09:47 


 


WBC   10.8 H   (5.0-10.0)  10^3/uL


 


RBC   3.73 L   (4.2-5.4)  10^6/uL


 


Hgb   10.4 L D   (12.0-16.0)  g/dL


 


Hct   32.4 L   (37.0-47.0)  %


 


MCV   86.9   ()  fL


 


MCH   27.9   (27.0-34.0)  pg


 


MCHC   32.1 L   (33.0-35.0)  g/dL


 


Plt Count   383   (150-450)  10^3/uL


 


Neut % (Auto)   69.2   (42.2-75.2)  %


 


Lymph % (Auto)   17.4 L   (20.5-50.1)  %


 


Mono % (Auto)   9.6 H   (2-8)  %


 


Eos % (Auto)   3.6 H   (1.0-3.0)  %


 


Baso % (Auto)   0.2   (0.0-1.0)  %


 


Sodium    134 L  (136-145)  mmol/L


 


Potassium    3.1 L D  (3.5-5.1)  mmol/L


 


Chloride    95 L  ()  mmol/L


 


Carbon Dioxide    33 H D  (21-32)  mmol/L


 


Anion Gap    9.1  (7-13)  mEq/L


 


BUN    49 H  (7-18)  mg/dL


 


Creatinine    2.02 H  (0.55-1.02)  mg/dL


 


Est Cr Clr Drug Dosing    19.91  mL/min


 


Estimated GFR (MDRD)    24  


 


Glucose    202 H  (74-99)  mg/dL


 


Calcium    9.1  (8.5-10.1)  mg/dL


 


Urine Color  Steve    (YELLOW)  


 


Urine Appearance  Turbid    (CLEAR)  


 


Urine pH  5.5    (5.0-9.0)  


 


Ur Specific Gravity  1.025    (1.005-1.030)  


 


Urine Protein  >=300 H    (NEGATIVE)  


 


Urine Glucose (UA)  Negative    (NEGATIVE)  


 


Urine Ketones  Trace H    (NEGATIVE)  


 


Urine Occult Blood  Large H    (NEGATIVE)  


 


Urine Nitrite  Positive H    (NEGATIVE)  


 


Urine Bilirubin  Small H    (NEGATIVE)  


 


Urine Urobilinogen  1.0    (0.2-1.0)  mg/dL


 


Ur Leukocyte Esterase  Large H    (NEGATIVE)  


 


Urine RBC  Packed H    /HPF


 


Urine WBC  20-30 H    (0-5/HPF)  /HPF


 


Ur Epithelial Cells  Moderate H    (NOT SEEN)  /HPF


 


Amorphous Sediment  Moderate H    (NOT SEEN)  /HPF


 


Urine Bacteria  Many H    (0-FEW/HPF)  /HPF


 


Urine Mucus  Few H    (NOT SEEN)  /LPF














Departure





- Departure


Time of Disposition: 10:29


Disposition: Home, Self-Care 01


Condition: Good


Clinical Impression: 


 Encounter for medical screening examination





UTI (urinary tract infection) due to urinary indwelling catheter


Qualifiers:


 Indwelling urinary catheter type: nephrostomy catheter Encounter type: initial 

encounter Qualified Code(s): T83.512A - Infection and inflammatory reaction due 

to nephrostomy catheter, initial encounter; N39.0 - Urinary tract infection, 

site not specified








- Discharge Information


*PRESCRIPTION DRUG MONITORING PROGRAM REVIEWED*: Not Applicable


*COPY OF PRESCRIPTION DRUG MONITORING REPORT IN PATIENT GUNJAN: Not Applicable


Instructions:  Urinary Tract Infection, Adult, Easy-to-Read, Indwelling Urinary 

Catheter Care, Adult


Forms:  ED Department Discharge


Additional Instructions: 


Rx: Cipro 500mg


Follow up in clinic for urine recheck in 5 to 7 days.





Sepsis Event Note (ED)





- Evaluation


Sepsis Screening Result: No Definite Risk





- Focused Exam


Vital Signs: 


                                   Vital Signs











  Temp Pulse Resp BP Pulse Ox


 


 11/07/20 09:21  98.3 F  66  14  158/69 H  97














- My Orders


Last 24 Hours: 


My Active Orders





11/07/20 09:40


CULTURE URINE [RM] Stat 














- Assessment/Plan


Last 24 Hours: 


My Active Orders





11/07/20 09:40


CULTURE URINE [RM] Stat

## 2021-03-10 ENCOUNTER — HOSPITAL ENCOUNTER (INPATIENT)
Dept: HOSPITAL 43 - DL.MS | Age: 76
LOS: 9 days | Discharge: HOME | DRG: 559 | End: 2021-03-19
Attending: INTERNAL MEDICINE | Admitting: INTERNAL MEDICINE
Payer: MEDICARE

## 2021-03-10 DIAGNOSIS — Z88.8: ICD-10-CM

## 2021-03-10 DIAGNOSIS — E11.42: ICD-10-CM

## 2021-03-10 DIAGNOSIS — Z98.49: ICD-10-CM

## 2021-03-10 DIAGNOSIS — I27.20: ICD-10-CM

## 2021-03-10 DIAGNOSIS — I25.10: ICD-10-CM

## 2021-03-10 DIAGNOSIS — E11.22: ICD-10-CM

## 2021-03-10 DIAGNOSIS — E78.00: ICD-10-CM

## 2021-03-10 DIAGNOSIS — D63.1: ICD-10-CM

## 2021-03-10 DIAGNOSIS — K59.09: ICD-10-CM

## 2021-03-10 DIAGNOSIS — Z47.81: Primary | ICD-10-CM

## 2021-03-10 DIAGNOSIS — M19.90: ICD-10-CM

## 2021-03-10 DIAGNOSIS — H54.7: ICD-10-CM

## 2021-03-10 DIAGNOSIS — Z95.5: ICD-10-CM

## 2021-03-10 DIAGNOSIS — Z88.5: ICD-10-CM

## 2021-03-10 DIAGNOSIS — Z88.0: ICD-10-CM

## 2021-03-10 DIAGNOSIS — Z89.512: ICD-10-CM

## 2021-03-10 DIAGNOSIS — I47.1: ICD-10-CM

## 2021-03-10 DIAGNOSIS — J18.9: ICD-10-CM

## 2021-03-10 DIAGNOSIS — E66.9: ICD-10-CM

## 2021-03-10 DIAGNOSIS — E11.51: ICD-10-CM

## 2021-03-10 DIAGNOSIS — I13.0: ICD-10-CM

## 2021-03-10 DIAGNOSIS — R53.81: ICD-10-CM

## 2021-03-10 DIAGNOSIS — N17.9: ICD-10-CM

## 2021-03-10 DIAGNOSIS — L97.429: ICD-10-CM

## 2021-03-10 DIAGNOSIS — E11.621: ICD-10-CM

## 2021-03-10 DIAGNOSIS — Z79.82: ICD-10-CM

## 2021-03-10 DIAGNOSIS — I50.22: ICD-10-CM

## 2021-03-10 DIAGNOSIS — N18.4: ICD-10-CM

## 2021-03-10 DIAGNOSIS — Z79.899: ICD-10-CM

## 2021-03-10 DIAGNOSIS — I25.2: ICD-10-CM

## 2021-03-10 RX ADMIN — HEPARIN SODIUM SCH UNITS: 5000 INJECTION, SOLUTION INTRAVENOUS; SUBCUTANEOUS at 22:52

## 2021-03-10 NOTE — PCM.HP
H&P History of Present Illness





- General


Date of Service: 03/10/21


Admit Problem/Dx: 


                           Admission Diagnosis/Problem





Admission Diagnosis/Problem      Amputation of lower limb











- History of Present Illness


Initial Comments - Free Text/Narative: 





75F w/ pmh systolic CHF EF 30-35%, CAD s/p CABG, mederate MR, pulmonary HT, SVT 

s/p ablation in 2019, DM2, CKD 4, HT, chronic b/l hydronephrosis s/p left 

nephrostomy in 2018 w/ chronic hematuria, anemia of chronic/renal disease, PVD 

c/b left heel non healing ulceration and now s/p left BKA on 3/2 c/b fluid 

overload, accelerated HT and possible pneumonia now transferred to  for Swing 

Bed.





Pt is currently w/o any complaints apart from chronic constipation.





- Related Data


Allergies/Adverse Reactions: 


                                    Allergies











Allergy/AdvReac Type Severity Reaction Status Date / Time


 


gemfibrozil [From Lopid] Allergy  Cannot Verified 03/10/21 12:30





   Remember  


 


Penicillins Allergy  Rash Verified 03/10/21 12:30


 


propranolol HCl Allergy  Cannot Verified 03/10/21 12:30





[From Inderal LA]   Remember  


 


codeine AdvReac  Irritabilit Verified 03/10/21 12:30





   y  


 


metformin AdvReac  Shaking Verified 03/10/21 12:30











Home Medications: 


                                    Home Meds





Aspirin [Lo-Dose Aspirin EC] 81 mg PO DAILY 02/11/19 [History]


Clopidogrel [Plavix] 75 mg PO DAILY 02/11/19 [History]


Oxybutynin Chloride [Ditropan Xl] 10 mg PO DAILY 02/11/19 [History]


Sodium Bicarbonate 1,300 mg PO BID 02/11/19 [History]


Acetaminophen 325 mg PO Q4HR PRN 03/10/21 [History]


Ascorbic Acid 500 mg PO DAILY 03/10/21 [History]


Brimonidine Tartrate/Timolol [Combigan 0.2%-0.5% Eye Drops] 1 drop EYEBOTH BID 

03/10/21 [History]


Bumetanide 1 mg PO DAILY 03/10/21 [History]


Cholecalciferol (Vitamin D3) [Vitamin D3] 2,000 unit PO DAILY 03/10/21 [History]


Furosemide [Lasix] 40 mg PO DAILY 03/10/21 [History]


Lactulose 30 ml PO BID PRN 03/10/21 [History]


Levofloxacin 500 mg PO .48HR 03/10/21 [History]


Omeprazole 20 mg PO DAILY 03/10/21 [History]


carvediloL [Carvedilol] 12.5 mg PO BID 03/10/21 [History]


oxyCODONE 5 mg PO Q6HR 03/10/21 [History]











Past Medical History


HEENT History: Reports: Impaired Vision


Other HEENT History: wears glasses


Cardiovascular History: Reports: CAD, High Cholesterol, Hypertension, MI, Stents


Respiratory History: Reports: None


Gastrointestinal History: Reports: Chronic Constipation


Genitourinary History: Reports: UTI, Recurrent


OB/GYN History: Reports: Pregnancy


Musculoskeletal History: Reports: Osteoarthritis


Neurological History: Reports: Neuropathy, Diabetic


Psychiatric History: Reports: None


Endocrine/Metabolic History: Reports: Diabetes, Type II, Obesity/BMI 30+


Hematologic History: Reports: Anemia


Immunologic History: Reports: None


Oncologic (Cancer) History: Reports: None


Dermatologic History: Reports: None





- Infectious Disease History


Infectious Disease History: Reports: None





- Past Surgical History


Head Surgeries/Procedures: Reports: None


HEENT Surgical History: Reports: Cataract Surgery


Cardiovascular Surgical History: Reports: Coronary Artery Bypass


GI Surgical History: Reports: Hernia Repair/Other


Female  Surgical History: Reports: Other (See Below)


Other Female  Surgeries/Procedures: bladder surgery


Musculoskeletal Surgical History: Reports: Other (See Below)


Other Musculoskeletal Surgeries/Procedures:: carpal tunnel surgery





Social & Family History





- Family History


Family Medical History: No Pertinent Family History





- Tobacco Use


Tobacco Use Status *Q: Never Tobacco User


Second Hand Smoke Exposure: No





- Caffeine Use


Caffeine Use: Reports: Soda, Tea





- Recreational Drug Use


Recreational Drug Use: No





- Living Situation & Occupation


Living situation: Reports: 


Occupation: Retired





H&P Review of Systems





- Review of Systems:


Review Of Systems: See Below


General: Denies: Fever, Chills


HEENT: Denies: Headaches


Pulmonary: Denies: Shortness of Breath, Wheezing


Cardiovascular: Denies: Chest Pain


Gastrointestinal: Reports: Constipation.  Denies: Abdominal Pain


Genitourinary: Denies: Dysuria


Musculoskeletal: Denies: Neck Pain


Skin: Denies: Jaundice


Psychiatric: Denies: Confusion, Depression


Neurological: Denies: Dizziness


Hematologic/Lymphatic: Reports: Anemia.  Denies: Easy Bleeding





Exam





- Exam


Exam: See Below





- Vital Signs


Vital Signs: 


                                Last Vital Signs











Temp  97.7 F   03/10/21 15:30


 


Pulse  64   03/10/21 15:30


 


Resp  20   03/10/21 15:30


 


BP  144/67 H  03/10/21 15:30


 


Pulse Ox  98   03/10/21 15:30











Weight: 160 lb 6.4 oz





- Exam


Quality Assessment: No: Supplemental Oxygen


General: Alert, Oriented, Cooperative


HEENT: Other (pale conjuctiva)


Neck: Supple


Lungs: Clear to Auscultation, Normal Respiratory Effort


Cardiovascular: Regular Rate, Regular Rhythm


GI/Abdominal Exam: Normal Bowel Sounds, Soft, Non-Tender, Other (mod distended)


 (Female) Exam: Other (well appearing left nephrostomy site)


Back Exam: No: Vertebral Tenderness


Extremities: No Pedal Edema


Skin: Warm, Dry, Other (dressing clean over left BKA - was just changed - did 

not open)


Neurological: Cranial Nerves Intact


Neuro Extensive - Mental Status: Alert, Oriented x3, Normal Mood/Affect


Problem List Initiated/Reviewed/Updated: No


Orders Last 24hrs: 


                               Active Orders 24 hr











 Category Date Time Status


 


 Patient Status [ADT] Routine ADT  03/10/21 15:06 Active


 


 Oxygen Therapy [RC] .PRN Care  03/10/21 15:06 Active


 


 Up With Assistance [RC] ASDIRECTED Care  03/10/21 15:14 Active


 


 VTE/DVT Education [RC] PER UNIT ROUTINE Care  03/10/21 15:06 Active


 


 Vital Signs [RC] QSHIFT Care  03/10/21 15:06 Active


 


 Wound Care [RC] 08 Care  03/11/21 08:00 Active


 


 OT Evaluation and Treatment [CONS] Routine Cons  03/10/21 15:14 Active


 


 PT Evaluation and Treatment [CONS] Routine Cons  03/10/21 15:14 Active


 


 Heart Healthy Diet [DIET] Diet  03/10/21 Dinner Active


 


 BASIC METABOLIC PANEL,BMP [CHEM] AM Lab  03/11/21 05:11 Ordered


 


 CBC WITH AUTO DIFF [HEME] AM Lab  03/11/21 05:11 Ordered


 


 MAGNESIUM [CHEM] AM Lab  03/11/21 05:11 Ordered


 


 PHOSPHORUS [CHEM] AM Lab  03/11/21 05:11 Ordered


 


 Acetaminophen [TylenoL] Med  03/10/21 15:14 Ordered





 650 mg PO Q4H PRN   


 


 Ascorbic Acid [Vitamin C] Med  03/11/21 09:00 Ordered





 500 mg PO DAILY   


 


 Aspirin [Halfprin] Med  03/11/21 09:00 Ordered





 81 mg PO DAILY   


 


 Brimonidine Tartrate/Timolol [Combigan 0.2%-0.5% Eye Med  03/10/21 21:00 

Ordered





 Drops]   





 1 drop EYEBOTH BID   


 


 Bumetanide [Bumex] Med  03/11/21 09:00 Ordered





 1 mg PO DAILY   


 


 Cholecalciferol (Vitamin D3) [Vitamin D3] Med  03/11/21 09:00 Ordered





 2,000 unit PO DAILY   


 


 Clopidogrel [Plavix] Med  03/11/21 09:00 Ordered





 75 mg PO DAILY   


 


 Heparin Sodium Med  03/10/21 22:00 Ordered





 5,000 units SUBCUT Q8HR   


 


 Lactulose [Lactulose] Med  03/10/21 15:18 Ordered





 30 ml PO BID PRN   


 


 Omeprazole Med  03/11/21 09:00 Ordered





 20 mg PO DAILY   


 


 Oxybutynin Chloride [Ditropan Xl] Med  03/11/21 09:00 Ordered





 10 mg PO DAILY   


 


 Sodium Bicarbonate Med  03/10/21 21:00 Ordered





 1,300 mg PO BID   


 


 carvediloL [Carvedilol] Med  03/10/21 21:00 Ordered





 12.5 mg PO BID   


 


 levoFLOXacin [Levaquin] Med  03/11/21 09:00 Ordered





 500 mg PO Q48H   


 


 oxyCODONE Med  03/10/21 18:00 Ordered





 5 mg PO Q6HR   


 


 Resuscitation Status Routine Resus Stat  03/10/21 15:06 Ordered











Assessment/Plan Comment:: 





#PVD s/p left BKA 3/2 - wound care and follow up per d/c instructions - will 

examine the leg at tomorrow's dressing change - PT/OT to evaluate and treat 

debility





#possible pneumonia - will finish course of levofloxacin per d/c instructions





#NITHIN on CKD4 s/p left nephrostomy - Cr noted rising 2.0 > 2.5 on day prior to 

discharge - will repeat tomorrow





#CAD / chronic systolic CHF / pulm HT / hx SVT / HT - c/w transfer meds





PPX - SQH





Full code

## 2021-03-11 LAB
ANION GAP SERPL CALC-SCNC: 15.4 MEQ/L (ref 7–13)
CHLORIDE SERPL-SCNC: 106 MMOL/L (ref 98–107)
SODIUM SERPL-SCNC: 145 MMOL/L (ref 136–145)

## 2021-03-11 RX ADMIN — HEPARIN SODIUM SCH UNITS: 5000 INJECTION, SOLUTION INTRAVENOUS; SUBCUTANEOUS at 14:34

## 2021-03-11 RX ADMIN — OMEPRAZOLE SCH MG: 20 CAPSULE, DELAYED RELEASE ORAL at 06:32

## 2021-03-11 RX ADMIN — OXYBUTYNIN CHLORIDE SCH MG: 5 TABLET, FILM COATED, EXTENDED RELEASE ORAL at 09:03

## 2021-03-11 RX ADMIN — Medication SCH DROP: at 14:36

## 2021-03-11 RX ADMIN — HEPARIN SODIUM SCH UNITS: 5000 INJECTION, SOLUTION INTRAVENOUS; SUBCUTANEOUS at 06:30

## 2021-03-11 RX ADMIN — Medication SCH DROP: at 21:51

## 2021-03-11 RX ADMIN — HEPARIN SODIUM SCH UNITS: 5000 INJECTION, SOLUTION INTRAVENOUS; SUBCUTANEOUS at 21:50

## 2021-03-11 RX ADMIN — VITAMIN D, TAB 1000IU (100/BT) SCH MCG: 25 TAB at 09:04

## 2021-03-12 RX ADMIN — OMEPRAZOLE SCH: 20 CAPSULE, DELAYED RELEASE ORAL at 05:00

## 2021-03-12 RX ADMIN — Medication SCH DROP: at 20:43

## 2021-03-12 RX ADMIN — VITAMIN D, TAB 1000IU (100/BT) SCH MCG: 25 TAB at 08:00

## 2021-03-12 RX ADMIN — HEPARIN SODIUM SCH: 5000 INJECTION, SOLUTION INTRAVENOUS; SUBCUTANEOUS at 05:00

## 2021-03-12 RX ADMIN — HEPARIN SODIUM SCH UNITS: 5000 INJECTION, SOLUTION INTRAVENOUS; SUBCUTANEOUS at 04:50

## 2021-03-12 RX ADMIN — OMEPRAZOLE SCH MG: 20 CAPSULE, DELAYED RELEASE ORAL at 04:50

## 2021-03-12 RX ADMIN — HEPARIN SODIUM SCH UNITS: 5000 INJECTION, SOLUTION INTRAVENOUS; SUBCUTANEOUS at 22:34

## 2021-03-12 RX ADMIN — HEPARIN SODIUM SCH UNITS: 5000 INJECTION, SOLUTION INTRAVENOUS; SUBCUTANEOUS at 13:41

## 2021-03-12 RX ADMIN — OXYBUTYNIN CHLORIDE SCH MG: 5 TABLET, FILM COATED, EXTENDED RELEASE ORAL at 08:00

## 2021-03-12 RX ADMIN — Medication SCH DROP: at 08:01

## 2021-03-13 RX ADMIN — OXYBUTYNIN CHLORIDE SCH MG: 5 TABLET, FILM COATED, EXTENDED RELEASE ORAL at 11:06

## 2021-03-13 RX ADMIN — VITAMIN D, TAB 1000IU (100/BT) SCH MCG: 25 TAB at 11:06

## 2021-03-13 RX ADMIN — HEPARIN SODIUM SCH UNITS: 5000 INJECTION, SOLUTION INTRAVENOUS; SUBCUTANEOUS at 05:10

## 2021-03-13 RX ADMIN — HEPARIN SODIUM SCH UNITS: 5000 INJECTION, SOLUTION INTRAVENOUS; SUBCUTANEOUS at 14:42

## 2021-03-13 RX ADMIN — OMEPRAZOLE SCH MG: 20 CAPSULE, DELAYED RELEASE ORAL at 05:10

## 2021-03-13 RX ADMIN — Medication SCH DROP: at 22:18

## 2021-03-13 RX ADMIN — Medication SCH DROP: at 11:07

## 2021-03-13 RX ADMIN — HEPARIN SODIUM SCH UNITS: 5000 INJECTION, SOLUTION INTRAVENOUS; SUBCUTANEOUS at 22:19

## 2021-03-14 RX ADMIN — VITAMIN D, TAB 1000IU (100/BT) SCH MCG: 25 TAB at 08:54

## 2021-03-14 RX ADMIN — OMEPRAZOLE SCH MG: 20 CAPSULE, DELAYED RELEASE ORAL at 06:28

## 2021-03-14 RX ADMIN — HEPARIN SODIUM SCH UNITS: 5000 INJECTION, SOLUTION INTRAVENOUS; SUBCUTANEOUS at 22:58

## 2021-03-14 RX ADMIN — OXYBUTYNIN CHLORIDE SCH MG: 5 TABLET, FILM COATED, EXTENDED RELEASE ORAL at 08:54

## 2021-03-14 RX ADMIN — HEPARIN SODIUM SCH UNITS: 5000 INJECTION, SOLUTION INTRAVENOUS; SUBCUTANEOUS at 06:27

## 2021-03-14 RX ADMIN — HEPARIN SODIUM SCH UNITS: 5000 INJECTION, SOLUTION INTRAVENOUS; SUBCUTANEOUS at 14:56

## 2021-03-14 RX ADMIN — Medication SCH DROP: at 20:16

## 2021-03-14 RX ADMIN — Medication SCH DROP: at 09:21

## 2021-03-15 RX ADMIN — HEPARIN SODIUM SCH: 5000 INJECTION, SOLUTION INTRAVENOUS; SUBCUTANEOUS at 22:10

## 2021-03-15 RX ADMIN — HEPARIN SODIUM SCH UNITS: 5000 INJECTION, SOLUTION INTRAVENOUS; SUBCUTANEOUS at 06:20

## 2021-03-15 RX ADMIN — VITAMIN D, TAB 1000IU (100/BT) SCH MCG: 25 TAB at 08:46

## 2021-03-15 RX ADMIN — HEPARIN SODIUM SCH UNITS: 5000 INJECTION, SOLUTION INTRAVENOUS; SUBCUTANEOUS at 13:12

## 2021-03-15 RX ADMIN — Medication SCH DROP: at 20:48

## 2021-03-15 RX ADMIN — Medication SCH DROP: at 08:46

## 2021-03-15 RX ADMIN — OMEPRAZOLE SCH MG: 20 CAPSULE, DELAYED RELEASE ORAL at 06:20

## 2021-03-15 RX ADMIN — HEPARIN SODIUM SCH UNITS: 5000 INJECTION, SOLUTION INTRAVENOUS; SUBCUTANEOUS at 20:49

## 2021-03-15 RX ADMIN — OXYBUTYNIN CHLORIDE SCH MG: 5 TABLET, FILM COATED, EXTENDED RELEASE ORAL at 08:47

## 2021-03-15 NOTE — PCM.PN
- General Info


Date of Service: 03/15/21


Admission Dx/Problem (Free Text): 





Pt has no complaints.  d/w pt at length regarding long term goals of 

rehabilitating and eventually walking w/ prosthesis.  She states she remains 

motivated to rehab but wants to do it on an outpatient basis.





- Patient Data


Vitals - Most Recent: 


                                Last Vital Signs











Temp  97.3 F   03/15/21 08:05


 


Pulse  62   03/15/21 17:25


 


Resp  20   03/15/21 08:05


 


BP  142/68 H  03/15/21 17:25


 


Pulse Ox  98   03/15/21 08:05











Weight - Most Recent: 161 lb 6.4 oz


I&O - Last 24 Hours: 


                                 Intake & Output











 03/15/21 03/15/21 03/15/21





 06:59 14:59 22:59


 


Intake Total 330 240 300


 


Output Total 225  250


 


Balance 105 240 50











Med Orders - Current: 


                               Current Medications





Acetaminophen (Acetaminophen 325 Mg Tab)  975 mg PO 0600,1400,2200 [facility]


   Last Admin: 03/15/21 13:12 Dose:  975 mg


   Documented by: 


Ascorbic Acid (Ascorbic Acid 500 Mg Tab)  500 mg PO DAILY [facility]


   Last Admin: 03/15/21 08:47 Dose:  500 mg


   Documented by: 


Aspirin (Aspirin 81 Mg Tab.Ec)  81 mg PO DAILY [facility]


   Last Admin: 03/15/21 08:46 Dose:  81 mg


   Documented by: 


Bumetanide (Bumetanide 1 Mg Tab)  1 mg PO DAILY [facility]


   Last Admin: 03/15/21 08:46 Dose:  1 mg


   Documented by: 


Carvedilol (Carvedilol 25 Mg Tab)  12.5 mg PO BIDMEALS [facility]


   Last Admin: 03/15/21 17:25 Dose:  12.5 mg


   Documented by: 


Cholecalciferol (Cholecalciferol (Vitamin D3) 25 Mcg Tab)  50 mcg PO DAILY [facility]


   Last Admin: 03/15/21 08:46 Dose:  50 mcg


   Documented by: 


Clopidogrel Bisulfate (Clopidogrel 75 Mg Tab)  75 mg PO DAILY [facility]


   Last Admin: 03/15/21 08:46 Dose:  75 mg


   Documented by: 


Heparin Sodium (Porcine) (Heparin Sodium 5,000 Units/Ml Vial)  5,000 units 

SUBCUT Q8HR [facility]


   Last Admin: 03/15/21 13:12 Dose:  5,000 units


   Documented by: 


Lactulose (Lactulose Soln 10 Gm/15 Ml 30 Ml Ud Cup)  20 gm PO BID PRN


   PRN Reason: Constipation


Brimonidine/Timolol [Combigan 0.2%-0.5%] Eye Drop**Own Med**  1 drop EYEBOTH BID

[facility]


   Last Admin: 03/15/21 08:46 Dose:  1 drop


   Documented by: 


Omeprazole (Omeprazole 20 Mg Cap.Cr)  20 mg PO ACBREAKFAST [facility]


   Last Admin: 03/15/21 06:20 Dose:  20 mg


   Documented by: 


Oxybutynin Chloride (Oxybutynin 5 Mg Tab.Er)  10 mg PO DAILY [facility]


   Last Admin: 03/15/21 08:47 Dose:  10 mg


   Documented by: 


Oxycodone HCl (Oxycodone 5 Mg Tab)  2.5 mg PO Q6HR PRN


   PRN Reason: Pain (severe 7-10)


Sodium Bicarbonate (Sodium Bicarbonate 650 Mg Tab)  1,300 mg PO BID [facility]


   Last Admin: 03/15/21 08:47 Dose:  1,300 mg


   Documented by: 





Discontinued Medications





Acetaminophen (Acetaminophen 325 Mg Tab)  650 mg PO Q4H PRN


   PRN Reason: Pain (Mild 1-3)/fever


   Last Admin: 03/10/21 17:11 Dose:  650 mg


   Documented by: 


Acetaminophen (Acetaminophen 325 Mg Tab)  975 mg PO Q8H [facility]


   Last Admin: 03/12/21 22:33 Dose:  975 mg


   Documented by: 


Levofloxacin (Levofloxacin 500 Mg Tab)  500 mg PO Q48H [facility]


   Stop: 03/13/21 09:01


   Last Admin: 03/13/21 11:05 Dose:  500 mg


   Documented by: 


Oxycodone HCl (Oxycodone 5 Mg Tab)  5 mg PO Q6HR [facility]


   Last Admin: 03/10/21 19:32 Dose:  Not Given


   Documented by: 


Sodium Bicarbonate (Sodium Bicarbonate 650 Mg Tab)  1,300 mg PO ONETIME ONE


   Stop: 03/13/21 12:31


   Last Admin: 03/13/21 12:33 Dose:  1,300 mg


   Documented by: 











- Exam


Quality Assessment: No: Supplemental Oxygen


General: Alert, Oriented, Cooperative


HEENT: Pupils Equal, Pupils Reactive


Neck: Supple


Lungs: Clear to Auscultation, Normal Respiratory Effort


Cardiovascular: Regular Rate, Regular Rhythm, No Murmurs


GI/Abdominal Exam: Normal Bowel Sounds, Soft, Non-Tender, No Distention


Back Exam: Normal Inspection


Extremities: No Pedal Edema, Other (s/p left BKA)


Skin: Warm, Dry


Wound/Incisions: Healing Well


Neurological: No New Focal Deficit


Psy/Mental Status: Alert, Normal Affect, Normal Mood





- Patient Data


Result Diagrams: 


                                 03/11/21 06:05





                                 03/11/21 06:05





Sepsis Event Note





- Evaluation


Sepsis Screening Result: No Definite Risk





- Focused Exam


Vital Signs: 


                                   Vital Signs











  Temp Pulse Pulse Resp BP BP Pulse Ox


 


 03/15/21 17:25   62    142/68 H  


 


 03/15/21 08:47   60    137/60  


 


 03/15/21 08:05  97.3 F   60  20   137/60  98














- Problem List Review


Problem List Initiated/Reviewed/Updated: No





- Plan


Plan:: 





#PVD s/p left BKA 3/2 - wound care and follow up per d/c instructions - surgical

site healing well - pt is requesting d/c home w/ home services and PT





#possible pneumonia - completed levofloxacin course as per d/c instructions





#NITHIN on CKD4 s/p left nephrostomy - will repeat to re-asses prior to d/c





#CAD / chronic systolic CHF / pulm HT / hx SVT / HT - c/w transfer meds





PPX - SQH





Full code

## 2021-03-16 LAB
ANION GAP SERPL CALC-SCNC: 14.1 MEQ/L (ref 7–13)
CHLORIDE SERPL-SCNC: 104 MMOL/L (ref 98–107)
SODIUM SERPL-SCNC: 140 MMOL/L (ref 136–145)

## 2021-03-16 RX ADMIN — OMEPRAZOLE SCH MG: 20 CAPSULE, DELAYED RELEASE ORAL at 06:24

## 2021-03-16 RX ADMIN — OXYBUTYNIN CHLORIDE SCH MG: 5 TABLET, FILM COATED, EXTENDED RELEASE ORAL at 08:29

## 2021-03-16 RX ADMIN — Medication SCH DROP: at 08:30

## 2021-03-16 RX ADMIN — HEPARIN SODIUM SCH UNITS: 5000 INJECTION, SOLUTION INTRAVENOUS; SUBCUTANEOUS at 21:09

## 2021-03-16 RX ADMIN — HEPARIN SODIUM SCH UNITS: 5000 INJECTION, SOLUTION INTRAVENOUS; SUBCUTANEOUS at 15:01

## 2021-03-16 RX ADMIN — VITAMIN D, TAB 1000IU (100/BT) SCH MCG: 25 TAB at 08:29

## 2021-03-16 RX ADMIN — Medication SCH DROP: at 21:13

## 2021-03-16 RX ADMIN — HEPARIN SODIUM SCH UNITS: 5000 INJECTION, SOLUTION INTRAVENOUS; SUBCUTANEOUS at 06:24

## 2021-03-17 RX ADMIN — Medication SCH DROP: at 21:13

## 2021-03-17 RX ADMIN — OMEPRAZOLE SCH: 20 CAPSULE, DELAYED RELEASE ORAL at 06:39

## 2021-03-17 RX ADMIN — HEPARIN SODIUM SCH UNITS: 5000 INJECTION, SOLUTION INTRAVENOUS; SUBCUTANEOUS at 21:14

## 2021-03-17 RX ADMIN — HEPARIN SODIUM SCH UNITS: 5000 INJECTION, SOLUTION INTRAVENOUS; SUBCUTANEOUS at 14:32

## 2021-03-17 RX ADMIN — VITAMIN D, TAB 1000IU (100/BT) SCH MCG: 25 TAB at 08:34

## 2021-03-17 RX ADMIN — Medication SCH DROP: at 08:38

## 2021-03-17 RX ADMIN — HEPARIN SODIUM SCH: 5000 INJECTION, SOLUTION INTRAVENOUS; SUBCUTANEOUS at 06:38

## 2021-03-17 RX ADMIN — OXYBUTYNIN CHLORIDE SCH MG: 5 TABLET, FILM COATED, EXTENDED RELEASE ORAL at 08:36

## 2021-03-18 RX ADMIN — OXYBUTYNIN CHLORIDE SCH MG: 5 TABLET, FILM COATED, EXTENDED RELEASE ORAL at 08:10

## 2021-03-18 RX ADMIN — HEPARIN SODIUM SCH UNITS: 5000 INJECTION, SOLUTION INTRAVENOUS; SUBCUTANEOUS at 06:11

## 2021-03-18 RX ADMIN — HEPARIN SODIUM SCH UNITS: 5000 INJECTION, SOLUTION INTRAVENOUS; SUBCUTANEOUS at 14:36

## 2021-03-18 RX ADMIN — VITAMIN D, TAB 1000IU (100/BT) SCH MCG: 25 TAB at 08:12

## 2021-03-18 RX ADMIN — Medication SCH DROP: at 08:09

## 2021-03-18 RX ADMIN — HEPARIN SODIUM SCH UNITS: 5000 INJECTION, SOLUTION INTRAVENOUS; SUBCUTANEOUS at 21:53

## 2021-03-18 RX ADMIN — Medication SCH DROP: at 21:59

## 2021-03-18 RX ADMIN — OMEPRAZOLE SCH MG: 20 CAPSULE, DELAYED RELEASE ORAL at 06:11

## 2021-03-19 VITALS — SYSTOLIC BLOOD PRESSURE: 105 MMHG | DIASTOLIC BLOOD PRESSURE: 65 MMHG | HEART RATE: 78 BPM

## 2021-03-19 RX ADMIN — OMEPRAZOLE SCH MG: 20 CAPSULE, DELAYED RELEASE ORAL at 06:38

## 2021-03-19 RX ADMIN — HEPARIN SODIUM SCH UNITS: 5000 INJECTION, SOLUTION INTRAVENOUS; SUBCUTANEOUS at 13:44

## 2021-03-19 RX ADMIN — OXYBUTYNIN CHLORIDE SCH MG: 5 TABLET, FILM COATED, EXTENDED RELEASE ORAL at 08:48

## 2021-03-19 RX ADMIN — VITAMIN D, TAB 1000IU (100/BT) SCH MCG: 25 TAB at 08:47

## 2021-03-19 RX ADMIN — HEPARIN SODIUM SCH UNITS: 5000 INJECTION, SOLUTION INTRAVENOUS; SUBCUTANEOUS at 06:38

## 2021-03-19 RX ADMIN — Medication SCH DROP: at 08:52

## 2021-03-19 NOTE — PCM.PN
- General Info


Date of Service: 03/19/21


Admission Dx/Problem (Free Text): 





75F w/ pmh systolic CHF EF 30-35%, CAD s/p CABG, mederate MR, pulmonary HT, SVT 

s/p ablation in 2019, DM2, CKD 4, HT, chronic b/l hydronephrosis s/p left 

nephrostomy in 2018 w/ chronic hematuria, anemia of chronic/renal disease, PVD 

c/b left heel non healing ulceration and now s/p left BKA on 3/2 c/b fluid 

overload, accelerated HT and possible pneumonia now transferred to  for Swing 

Bed.





The pt had uneventful stay.  Despite her family claiming the home is not ready 

for her the pt insisted that she be discharged home.  Similarly PT did not 

believe the pt is ready to go home and still needs help w/ transfers.  I spoke 

w/ the pt several times warning her that the outcome may be that she will be 

left wheelchair bound and never walk w/ a prosthesis.  She insisted on 

discharge.  Daughter was instructed on wound care and transfers.





- Patient Data


Vitals - Most Recent: 


                                Last Vital Signs











Temp  98.7 F   03/19/21 07:46


 


Pulse  78   03/19/21 08:48


 


Resp  16   03/19/21 07:46


 


BP  105/65   03/19/21 08:48


 


Pulse Ox  100   03/19/21 07:46











Weight - Most Recent: 161 lb


I&O - Last 24 Hours: 


                                 Intake & Output











 03/19/21 03/19/21 03/19/21





 06:59 14:59 22:59


 


Intake Total  575 


 


Balance  575 











Med Orders - Current: 


                               Current Medications








Discontinued Medications





Acetaminophen (Acetaminophen 325 Mg Tab)  650 mg PO Q4H PRN


   PRN Reason: Pain (Mild 1-3)/fever


   Last Admin: 03/10/21 17:11 Dose:  650 mg


   Documented by: 


Acetaminophen (Acetaminophen 325 Mg Tab)  975 mg PO Q8H [facility]


   Last Admin: 03/12/21 22:33 Dose:  975 mg


   Documented by: 


Acetaminophen (Acetaminophen 325 Mg Tab)  975 mg PO 0600,1400,2200 [facility]


   Last Admin: 03/19/21 13:44 Dose:  975 mg


   Documented by: 


Acetaminophen (Acetaminophen 325 Mg Tab)  975 mg PO .STK-MED ONE


   Stop: 03/17/21 06:01


Ascorbic Acid (Ascorbic Acid 500 Mg Tab)  500 mg PO DAILY [facility]


   Last Admin: 03/19/21 08:48 Dose:  500 mg


   Documented by: 


Aspirin (Aspirin 81 Mg Tab.Ec)  81 mg PO DAILY [facility]


   Last Admin: 03/19/21 08:48 Dose:  81 mg


   Documented by: 


Bumetanide (Bumetanide 1 Mg Tab)  1 mg PO DAILY [facility]


   Last Admin: 03/19/21 08:48 Dose:  1 mg


   Documented by: 


Carvedilol (Carvedilol 25 Mg Tab)  12.5 mg PO BIDMEALS [facility]


   Last Admin: 03/19/21 08:48 Dose:  12.5 mg


   Documented by: 


Cholecalciferol (Cholecalciferol (Vitamin D3) 25 Mcg Tab)  50 mcg PO DAILY [facility]


   Last Admin: 03/19/21 08:47 Dose:  50 mcg


   Documented by: 


Clopidogrel Bisulfate (Clopidogrel 75 Mg Tab)  75 mg PO DAILY [facility]


   Last Admin: 03/19/21 08:48 Dose:  75 mg


   Documented by: 


Heparin Sodium (Porcine) (Heparin Sodium 5,000 Units/Ml Vial)  5,000 units 

SUBCUT Q8HR [facility]


   Last Admin: 03/19/21 13:44 Dose:  5,000 units


   Documented by: 


Heparin Sodium (Porcine) (Heparin Sodium 5,000 Units/Ml Vial)  5,000 units 

SUBCUT .STK-MED ONE


   Stop: 03/17/21 06:01


Lactulose (Lactulose Soln 10 Gm/15 Ml 30 Ml Ud Cup)  20 gm PO BID PRN


   PRN Reason: Constipation


Levofloxacin (Levofloxacin 500 Mg Tab)  500 mg PO Q48H [facility]


   Stop: 03/13/21 09:01


   Last Admin: 03/13/21 11:05 Dose:  500 mg


   Documented by: 


Brimonidine/Timolol [Combigan 0.2%-0.5%] Eye Drop**Own Med**  1 drop EYEBOTH BID

[facility]


   Last Admin: 03/19/21 08:52 Dose:  1 drop


   Documented by: 


Omeprazole (Omeprazole 20 Mg Cap.Cr)  20 mg PO ACBREAKFAST [facility]


   Last Admin: 03/19/21 06:38 Dose:  20 mg


   Documented by: 


Omeprazole (Omeprazole 20 Mg Cap.Cr)  20 mg PO .STK-MED ONE


   Stop: 03/17/21 06:01


Oxybutynin Chloride (Oxybutynin 5 Mg Tab.Er)  10 mg PO DAILY [facility]


   Last Admin: 03/19/21 08:48 Dose:  10 mg


   Documented by: 


Oxycodone HCl (Oxycodone 5 Mg Tab)  5 mg PO Q6HR [facility]


   Last Admin: 03/10/21 19:32 Dose:  Not Given


   Documented by: 


Oxycodone HCl (Oxycodone 5 Mg Tab)  2.5 mg PO Q6HR PRN


   PRN Reason: Pain (severe 7-10)


Sodium Bicarbonate (Sodium Bicarbonate 650 Mg Tab)  1,300 mg PO BID [facility]


   Last Admin: 03/19/21 08:48 Dose:  1,300 mg


   Documented by: 


Sodium Bicarbonate (Sodium Bicarbonate 650 Mg Tab)  1,300 mg PO ONETIME ONE


   Stop: 03/13/21 12:31


   Last Admin: 03/13/21 12:33 Dose:  1,300 mg


   Documented by: 











- Patient Data


Result Diagrams: 


                                 03/11/21 06:05





                                 03/16/21 05:54





Sepsis Event Note





- Evaluation


Sepsis Screening Result: No Definite Risk





- Focused Exam


Vital Signs: 


                                   Vital Signs











  Temp Pulse Pulse Resp BP BP Pulse Ox


 


 03/19/21 08:48   78    105/65  


 


 03/19/21 07:46  98.7 F   78  16   105/65  100














- Problem List Review


Problem List Initiated/Reviewed/Updated: No





- Plan


Plan:: 





#PVD s/p left BKA 3/2 - wound care and follow up per d/c instructions - surgical

site healing well - pt is requesting d/c home w/ home services and PT





#possible pneumonia - completed levofloxacin course as per d/c instructions





#NITHIN on CKD4 s/p left nephrostomy - will repeat to re-asses prior to d/c





#CAD / chronic systolic CHF / pulm HT / hx SVT / HT - c/w transfer meds





PPX - SQH





Full code

## 2021-03-19 NOTE — [id number]
**Discharge Summary





- Hospital Course


Free Text/Narrative:: 





75F w/ pmh systolic CHF EF 30-35%, CAD s/p CABG, mederate MR, pulmonary HT, SVT 

s/p ablation in 2019, DM2, CKD 4, HT, chronic b/l hydronephrosis s/p left 

nephrostomy in 2018 w/ chronic hematuria, anemia of chronic/renal disease, PVD 

c/b left heel non healing ulceration and now s/p left BKA on 3/2 c/b fluid 

overload, accelerated HT and possible pneumonia now transferred to  for Swing 

Bed.





The pt had uneventful stay.  Despite her family claiming the home is not ready 

for her the pt insisted that she be discharged home.  Similarly PT did not 

believe the pt is ready to go home and still needs help w/ transfers.  I spoke 

w/ the pt several times warning her that the outcome may be that she will be 

left wheelchair bound and never walk w/ a prosthesis.  She insisted on 

discharge.  Daughter was instructed on wound care and transfers.





- Discharge Data


Discharge Date: 03/19/21


Discharge Disposition: Home, Self-Care 01


Condition: Good





- Referral to Home Health


Primary Care Physician: 


Zoey Lisa NP








- Patient Summary/Data


Consults: 


                                  Consultations





03/10/21 15:14


OT Evaluation and Treatment [CONS] Routine 


PT Evaluation and Treatment [CONS] Routine 














- Discharge Plan


*PRESCRIPTION DRUG MONITORING PROGRAM REVIEWED*: Not Applicable


*COPY OF PRESCRIPTION DRUG MONITORING REPORT IN PATIENT GUNJAN: Not Applicable


Prescriptions/Med Rec: 


Bumetanide [Bumex] 1 mg PO DAILY #30 tablet


carvediloL [Coreg] 12.5 mg PO BIDMEALS #30 tablet


Home Medications: 


                                    Home Meds





Aspirin [Lo-Dose Aspirin EC] 81 mg PO DAILY 02/11/19 [History]


Clopidogrel [Plavix] 75 mg PO DAILY 02/11/19 [History]


Oxybutynin Chloride [Ditropan Xl] 10 mg PO DAILY 02/11/19 [History]


Ascorbic Acid 500 mg PO DAILY 03/10/21 [History]


Brimonidine Tartrate/Timolol [Combigan 0.2%-0.5% Eye Drops] 1 drop EYEBOTH BID 

03/10/21 [History]


Bumetanide 1 mg PO DAILY 03/10/21 [History]


Cholecalciferol (Vitamin D3) [Vitamin D3] 2,000 unit PO DAILY 03/10/21 [History]


Lactulose 30 ml PO BID PRN 03/10/21 [History]


Omeprazole 20 mg PO DAILY 03/10/21 [History]


Bumetanide [Bumex] 1 mg PO DAILY #30 tablet 03/19/21 [Rx]


carvediloL [Coreg] 12.5 mg PO BIDMEALS #30 tablet 03/19/21 [Rx]








Patient Handouts:  Fall Prevention in the Home, Adult, Easy-to-Read, Stump and 

Prosthesis Care, Living With an Amputation, Weakness, Easy-to-Read, Bumetanide 

Oral Tablets, Carvedilol Tablets, How to Change Your Wound Dressing, 

Easy-to-Read, Leg Amputation, Care After





- Discharge Summary/Plan Comment


DC Time >30 min.: Yes (35 min)





- Patient Data


Vitals - Most Recent: 


                                Last Vital Signs











Temp  98.7 F   03/19/21 07:46


 


Pulse  78   03/19/21 08:48


 


Resp  16   03/19/21 07:46


 


BP  105/65   03/19/21 08:48


 


Pulse Ox  100   03/19/21 07:46











Weight - Most Recent: 161 lb


I&O - Last 24 hours: 


                                 Intake & Output











 03/19/21 03/19/21 03/19/21





 06:59 14:59 22:59


 


Intake Total  575 


 


Balance  575 











Med Orders - Current: 


                               Current Medications








Discontinued Medications





Acetaminophen (Acetaminophen 325 Mg Tab)  650 mg PO Q4H PRN


   PRN Reason: Pain (Mild 1-3)/fever


   Last Admin: 03/10/21 17:11 Dose:  650 mg


   Documented by: 


Acetaminophen (Acetaminophen 325 Mg Tab)  975 mg PO Q8H [facility]


   Last Admin: 03/12/21 22:33 Dose:  975 mg


   Documented by: 


Acetaminophen (Acetaminophen 325 Mg Tab)  975 mg PO 0600,1400,2200 [facility]


   Last Admin: 03/19/21 13:44 Dose:  975 mg


   Documented by: 


Acetaminophen (Acetaminophen 325 Mg Tab)  975 mg PO .STK-MED ONE


   Stop: 03/17/21 06:01


Ascorbic Acid (Ascorbic Acid 500 Mg Tab)  500 mg PO DAILY [facility]


   Last Admin: 03/19/21 08:48 Dose:  500 mg


   Documented by: 


Aspirin (Aspirin 81 Mg Tab.Ec)  81 mg PO DAILY [facility]


   Last Admin: 03/19/21 08:48 Dose:  81 mg


   Documented by: 


Bumetanide (Bumetanide 1 Mg Tab)  1 mg PO DAILY [facility]


   Last Admin: 03/19/21 08:48 Dose:  1 mg


   Documented by: 


Carvedilol (Carvedilol 25 Mg Tab)  12.5 mg PO BIDMEALS [facility]


   Last Admin: 03/19/21 08:48 Dose:  12.5 mg


   Documented by: 


Cholecalciferol (Cholecalciferol (Vitamin D3) 25 Mcg Tab)  50 mcg PO DAILY [facility]


   Last Admin: 03/19/21 08:47 Dose:  50 mcg


   Documented by: 


Clopidogrel Bisulfate (Clopidogrel 75 Mg Tab)  75 mg PO DAILY [facility]


   Last Admin: 03/19/21 08:48 Dose:  75 mg


   Documented by: 


Heparin Sodium (Porcine) (Heparin Sodium 5,000 Units/Ml Vial)  5,000 units 

SUBCUT Q8HR [facility]


   Last Admin: 03/19/21 13:44 Dose:  5,000 units


   Documented by: 


Heparin Sodium (Porcine) (Heparin Sodium 5,000 Units/Ml Vial)  5,000 units 

SUBCUT .STK-MED ONE


   Stop: 03/17/21 06:01


Lactulose (Lactulose Soln 10 Gm/15 Ml 30 Ml Ud Cup)  20 gm PO BID PRN


   PRN Reason: Constipation


Levofloxacin (Levofloxacin 500 Mg Tab)  500 mg PO Q48H [facility]


   Stop: 03/13/21 09:01


   Last Admin: 03/13/21 11:05 Dose:  500 mg


   Documented by: 


Brimonidine/Timolol [Combigan 0.2%-0.5%] Eye Drop**Own Med**  1 drop EYEBOTH BID

[facility]


   Last Admin: 03/19/21 08:52 Dose:  1 drop


   Documented by: 


Omeprazole (Omeprazole 20 Mg Cap.Cr)  20 mg PO ACBREAKFAST [facility]


   Last Admin: 03/19/21 06:38 Dose:  20 mg


   Documented by: 


Omeprazole (Omeprazole 20 Mg Cap.Cr)  20 mg PO .STK-MED ONE


   Stop: 03/17/21 06:01


Oxybutynin Chloride (Oxybutynin 5 Mg Tab.Er)  10 mg PO DAILY [facility]


   Last Admin: 03/19/21 08:48 Dose:  10 mg


   Documented by: 


Oxycodone HCl (Oxycodone 5 Mg Tab)  5 mg PO Q6HR [facility]


   Last Admin: 03/10/21 19:32 Dose:  Not Given


   Documented by: 


Oxycodone HCl (Oxycodone 5 Mg Tab)  2.5 mg PO Q6HR PRN


   PRN Reason: Pain (severe 7-10)


Sodium Bicarbonate (Sodium Bicarbonate 650 Mg Tab)  1,300 mg PO BID [facility]


   Last Admin: 03/19/21 08:48 Dose:  1,300 mg


   Documented by: 


Sodium Bicarbonate (Sodium Bicarbonate 650 Mg Tab)  1,300 mg PO ONETIME ONE


   Stop: 03/13/21 12:31


   Last Admin: 03/13/21 12:33 Dose:  1,300 mg


   Documented by: 











- Exam


Quality Assessment: Denies: Supplemental Oxygen


General: Reports: Alert, Oriented, Cooperative


HEENT: Reports: Pupils Equal, Pupils Reactive


Neck: Reports: Supple


Lungs: Reports: Clear to Auscultation, Normal Respiratory Effort


Cardiovascular: Reports: Regular Rate, Regular Rhythm, No Murmurs


GI/Abdominal Exam: Normal Bowel Sounds, Soft, Non-Tender, No Distention


Extremities: No Pedal Edema, Other (s/p left BKA)


Skin: Reports: Warm, Dry, Intact


Wound/Incisions: Reports: Healing Well


Neurological: Reports: No New Focal Deficit


Psy/Mental Status: Reports: Alert, Normal Affect, Normal Mood

## 2021-06-05 ENCOUNTER — HOSPITAL ENCOUNTER (EMERGENCY)
Dept: HOSPITAL 43 - DL.ED | Age: 76
LOS: 1 days | Discharge: HOME | End: 2021-06-06
Payer: MEDICARE

## 2021-06-05 DIAGNOSIS — I25.2: ICD-10-CM

## 2021-06-05 DIAGNOSIS — N99.522: ICD-10-CM

## 2021-06-05 DIAGNOSIS — N18.9: ICD-10-CM

## 2021-06-05 DIAGNOSIS — Z88.8: ICD-10-CM

## 2021-06-05 DIAGNOSIS — E78.00: ICD-10-CM

## 2021-06-05 DIAGNOSIS — I12.9: ICD-10-CM

## 2021-06-05 DIAGNOSIS — E11.22: ICD-10-CM

## 2021-06-05 DIAGNOSIS — Z88.5: ICD-10-CM

## 2021-06-05 DIAGNOSIS — I25.10: ICD-10-CM

## 2021-06-05 DIAGNOSIS — N30.00: Primary | ICD-10-CM

## 2021-06-05 DIAGNOSIS — Z88.0: ICD-10-CM

## 2021-06-06 VITALS — DIASTOLIC BLOOD PRESSURE: 73 MMHG | SYSTOLIC BLOOD PRESSURE: 134 MMHG | HEART RATE: 55 BPM

## 2021-06-06 LAB
ANION GAP SERPL CALC-SCNC: 15.1 MEQ/L (ref 7–13)
CHLORIDE SERPL-SCNC: 109 MMOL/L (ref 98–107)
SODIUM SERPL-SCNC: 143 MMOL/L (ref 136–145)

## 2021-06-06 NOTE — CT
PROCEDURE INFORMATION: 

Exam: CT Abdomen And Pelvis Without Contrast 

Exam date and time: 6/6/2021 12:36 AM 

Age: 75 years old 

Clinical indication: Other: Pain; Additional info: R/O kidney stones 



TECHNIQUE: 

Imaging protocol: Computed tomography of the abdomen and pelvis without 

contrast. 

Radiation optimization: All CT scans at this facility use at least one of these 

dose optimization techniques: automated exposure control; mA and/or kV 

adjustment per patient size (includes targeted exams where dose is matched to 

clinical indication); or iterative reconstruction. 



COMPARISON: 

CT Abdomen Pelvis wo Cont 3/26/2019 10:21 AM 



FINDINGS: 

Lungs: Multiple gallstones again noted new para cardiomegaly is noted new para 

linear bands of atelectasis in lung bases 



Liver: Normal. No mass. 

Gallbladder and bile ducts: Normal. No calcified stones. No ductal dilation. 

Pancreas: Normal. No ductal dilation. 

Spleen: Normal. No splenomegaly. 

Adrenal glands: Normal. No mass. 

Kidneys and ureters: There is now left percutaneous nephrostomy. Catheter is 

coiled in the proximal left ureter. There is air in the left renal pelvis. No 

nephrolithiasis.  No hydronephrosis.

Stomach and bowel: Unremarkable. No obstruction. No mucosal thickening. 

Appendix: No evidence of appendicitis. 



Intraperitoneal space: Unremarkable. No free air. No significant fluid 

collection. 

Vasculature: Unremarkable. No abdominal aortic aneurysm. 

Lymph nodes: Unremarkable. No enlarged lymph nodes. 

Urinary bladder: Questionable thickening in urinary bladder. Air in urinary 

bladder 

Reproductive: Unremarkable as visualized. 

Bones/joints: Unremarkable. No acute fracture. 

Soft tissues: Unremarkable. 



IMPRESSION: 

1. Air in left renal system and urinary bladder most likely due to recent 

procedure. Infection not excluded. Correlate with urine analysis. Emphysematous 

pyelonephritis not excluded on this exam 

2. Note left percutaneous nephrostomy is coiled into the proximal left ureter 

3. Cholelithiasis

## 2021-06-06 NOTE — EDM.PDOC
ED HPI GENERAL MEDICAL PROBLEM





- General


Chief Complaint: Genitourinary Problem


Stated Complaint: POSSIBLE UTI


Time Seen by Provider: 21 01:48


Source of Information: Reports: Patient, RN, RN Notes Reviewed


History Limitations: Reports: No Limitations





- History of Present Illness


INITIAL COMMENTS - FREE TEXT/NARRATIVE: 


Flory is a 74 y/o female with history of CKD and s/p left nephrostomy tube 

placement (2019) who presents to the ED via personal vehicle with daughter for 

complaints of dysuria.  The patient reports her symptoms began "..a few days 

ago" and have maintained in that time.  She notes she became worried about 

infection when she noticed blood in her nephrostomy tube bag this morning.  She 

denies fever, shaking chills, nausea, vomiting, abdominal pain, CVA tenderness, 

or diarrhea.  The last time she was treated for UTI was approximately two moths 

ago.  She has not taken any medication for these symptoms. 











- Related Data


                                    Allergies











Allergy/AdvReac Type Severity Reaction Status Date / Time


 


gemfibrozil [From Lopid] Allergy  Cannot Verified 21 23:28





   Remember  


 


Penicillins Allergy  Rash Verified 21 23:28


 


propranolol HCl Allergy  Cannot Verified 21 23:28





[From Inderal LA]   Remember  


 


codeine AdvReac  Irritabilit Verified 21 23:28





   y  


 


metformin AdvReac  Shaking Verified 21 23:28











Home Meds: 


                                    Home Meds





Aspirin [Lo-Dose Aspirin EC] 81 mg PO DAILY 19 [History]


Clopidogrel [Plavix] 75 mg PO DAILY 19 [History]


Oxybutynin Chloride [Ditropan Xl] 10 mg PO DAILY 19 [History]


Ascorbic Acid 500 mg PO DAILY 03/10/21 [History]


Brimonidine Tartrate/Timolol [Combigan 0.2%-0.5% Eye Drops] 1 drop EYEBOTH BID 

03/10/21 [History]


Bumetanide 1 mg PO DAILY 03/10/21 [History]


Cholecalciferol (Vitamin D3) [Vitamin D3] 2,000 unit PO DAILY 03/10/21 [History]


Lactulose 30 ml PO BID PRN 03/10/21 [History]


Omeprazole 20 mg PO DAILY 03/10/21 [History]


Bumetanide [Bumex] 1 mg PO DAILY #30 tablet 21 [Rx]


carvediloL [Coreg] 12.5 mg PO BIDMEALS #30 tablet 21 [Rx]











Past Medical History


HEENT History: Reports: Impaired Vision


Other HEENT History: wears glasses


Cardiovascular History: Reports: CAD, High Cholesterol, Hypertension, MI, Stents


Respiratory History: Reports: None


Gastrointestinal History: Reports: Chronic Constipation


Genitourinary History: Reports: UTI, Recurrent


OB/GYN History: Reports: Pregnancy


Musculoskeletal History: Reports: Osteoarthritis


Neurological History: Reports: Neuropathy, Diabetic


Psychiatric History: Reports: None


Endocrine/Metabolic History: Reports: Diabetes, Type II, Obesity/BMI 30+


Hematologic History: Reports: Anemia


Immunologic History: Reports: None


Oncologic (Cancer) History: Reports: None


Dermatologic History: Reports: None





- Infectious Disease History


Infectious Disease History: Reports: None





- Past Surgical History


Head Surgeries/Procedures: Reports: None


HEENT Surgical History: Reports: Cataract Surgery


Cardiovascular Surgical History: Reports: Coronary Artery Bypass


GI Surgical History: Reports: Hernia Repair/Other


Female  Surgical History: Reports: Other (See Below)


Other Female  Surgeries/Procedures: bladder surgery


Musculoskeletal Surgical History: Reports: Other (See Below)


Other Musculoskeletal Surgeries/Procedures:: carpal tunnel surgery





Social & Family History





- Family History


Family Medical History: No Pertinent Family History





- Tobacco Use


Tobacco Use Status *Q: Never Tobacco User


Second Hand Smoke Exposure: No





- Caffeine Use


Caffeine Use: Reports: Coffee





- Recreational Drug Use


Recreational Drug Use: No





- Living Situation & Occupation


Living situation: Reports: 


Occupation: Retired





ED ROS GENERAL





- Review of Systems


Review Of Systems: Comprehensive ROS is negative, except as noted in HPI.





ED EXAM, RENAL/





- Physical Exam


Exam: See Below


Exam Limited By: No Limitations


General Appearance: Alert, No Apparent Distress


Throat/Mouth: Normal Inspection, Normal Oropharynx, Normal Voice, No Airway 

Compromise


Head: Atraumatic, Normocephalic


Neck: Normal Inspection, Supple, Non-Tender, Full Range of Motion


Respiratory/Chest: No Respiratory Distress, Lungs Clear, Normal Breath Sounds, 

No Accessory Muscle Use, Chest Non-Tender


Cardiovascular: Normal Peripheral Pulses, Regular Rate, Rhythm, No Edema, No Gal

lop, No JVD, No Murmur, No Rub


GI/Abdominal: Normal Bowel Sounds, Soft, Non-Tender, No Distention, No Abnormal 

Bruit, No Mass, Pelvis Stable


 (Female) Exam: Deferred


Rectal (Female) Exam: Deferred


Back Exam: Full Range of Motion, Other (Left nephrostomy tube).  No: CVA 

Tenderness (L), CVA Tenderness (R)


Extremities: Normal Inspection, Normal Range of Motion, Non-Tender, No Pedal 

Edema, Normal Capillary Refill


Neurological: Alert, Oriented, CN II-XII Intact, Normal Cognition, Normal Gait, 

No Motor/Sensory Deficits


Psychiatric: Normal Affect, Normal Mood


Skin Exam: Warm, Dry, Intact, Normal Color, No Rash.  No: Diaphoretic, Erythema,

 Jaundice, Mottled, Pallor, Petechiae





Course





- Vital Signs


Last Recorded V/S: 


                                Last Vital Signs











Temp  96.8 F L  21 03:17


 


Pulse  55 L  21 03:17


 


Resp  19   21 03:17


 


BP  134/73   21 03:17


 


Pulse Ox  100   21 03:17














- Orders/Labs/Meds


Orders: 


                               Active Orders 24 hr











 Category Date Time Status


 


 CULTURE URINE [RM] Stat Lab  21 23:15 Received











Labs: 


                                Laboratory Tests











  21 Range/Units





  23:15 01:07 01:07 


 


WBC   11.0 H   (5.0-10.0)  10^3/uL


 


RBC   3.53 L   (4.2-5.4)  10^6/uL


 


Hgb   10.7 L D   (12.0-16.0)  g/dL


 


Hct   32.9 L   (37.0-47.0)  %


 


MCV   93.2  D   ()  fL


 


MCH   30.3   (27.0-34.0)  pg


 


MCHC   32.5 L   (33.0-35.0)  g/dL


 


Plt Count   344   (150-450)  10^3/uL


 


Neut % (Auto)   62.0   (42.2-75.2)  %


 


Lymph % (Auto)   24.1   (20.5-50.1)  %


 


Mono % (Auto)   9.6 H   (2-8)  %


 


Eos % (Auto)   3.7 H   (1.0-3.0)  %


 


Baso % (Auto)   0.6   (0.0-1.0)  %


 


Sodium    143  (136-145)  mmol/L


 


Potassium    3.1 L  (3.5-5.1)  mmol/L


 


Chloride    109 H  ()  mmol/L


 


Carbon Dioxide    22  (21-32)  mmol/L


 


Anion Gap    15.1 H  (7-13)  mEq/L


 


BUN    37 H  (7-18)  mg/dL


 


Creatinine    1.66 H  (0.55-1.02)  mg/dL


 


Est Cr Clr Drug Dosing    TNP  


 


Estimated GFR (MDRD)    30  


 


BUN/Creatinine Ratio    22.3  (No establ ref range)  


 


Glucose    156 H  (70-99)  mg/dL


 


Lactic Acid     (0.4-2.0)  mmol/L


 


Calcium    8.8  (8.5-10.1)  mg/dL


 


Total Bilirubin    0.3  (0.2-1.0)  mg/dL


 


AST    10 L  (15-37)  U/L


 


ALT    16  (14-59)  U/L


 


Alkaline Phosphatase    101  ()  U/L


 


Total Protein    6.7  (6.4-8.2)  g/dL


 


Albumin    2.4 L  (3.4-5.0)  g/dL


 


Globulin    4.3  


 


Albumin/Globulin Ratio    0.56  


 


Urine Color  Jessica    (YELLOW)  


 


Urine Appearance  Turbid    (CLEAR)  


 


Urine pH  5.0    (5.0-9.0)  


 


Ur Specific Gravity  1.020    (1.005-1.030)  


 


Urine Protein  >=300 H    (NEGATIVE)  


 


Urine Glucose (UA)  Negative    (NEGATIVE)  


 


Urine Ketones  Negative    (NEGATIVE)  


 


Urine Occult Blood  Large H    (NEGATIVE)  


 


Urine Nitrite  Negative    (NEGATIVE)  


 


Urine Bilirubin  Negative    (NEGATIVE)  


 


Urine Urobilinogen  0.2    (0.2-1.0)  mg/dL


 


Ur Leukocyte Esterase  Moderate H    (NEGATIVE)  


 


Urine RBC  >100 H    /HPF


 


Urine WBC  >100 H    (0-5/HPF)  /HPF


 


Ur Epithelial Cells  Rare    (NOT SEEN)  /HPF


 


Amorphous Sediment  Moderate H    (NOT SEEN)  /HPF


 


Urine Bacteria  Many H    (0-FEW/HPF)  /HPF


 


Urine Mucus  Not seen    (NOT SEEN)  /LPF














  21 Range/Units





  01:07 


 


WBC   (5.0-10.0)  10^3/uL


 


RBC   (4.2-5.4)  10^6/uL


 


Hgb   (12.0-16.0)  g/dL


 


Hct   (37.0-47.0)  %


 


MCV   ()  fL


 


MCH   (27.0-34.0)  pg


 


MCHC   (33.0-35.0)  g/dL


 


Plt Count   (150-450)  10^3/uL


 


Neut % (Auto)   (42.2-75.2)  %


 


Lymph % (Auto)   (20.5-50.1)  %


 


Mono % (Auto)   (2-8)  %


 


Eos % (Auto)   (1.0-3.0)  %


 


Baso % (Auto)   (0.0-1.0)  %


 


Sodium   (136-145)  mmol/L


 


Potassium   (3.5-5.1)  mmol/L


 


Chloride   ()  mmol/L


 


Carbon Dioxide   (21-32)  mmol/L


 


Anion Gap   (7-13)  mEq/L


 


BUN   (7-18)  mg/dL


 


Creatinine   (0.55-1.02)  mg/dL


 


Est Cr Clr Drug Dosing   


 


Estimated GFR (MDRD)   


 


BUN/Creatinine Ratio   (No establ ref range)  


 


Glucose   (70-99)  mg/dL


 


Lactic Acid  0.5  (0.4-2.0)  mmol/L


 


Calcium   (8.5-10.1)  mg/dL


 


Total Bilirubin   (0.2-1.0)  mg/dL


 


AST   (15-37)  U/L


 


ALT   (14-59)  U/L


 


Alkaline Phosphatase   ()  U/L


 


Total Protein   (6.4-8.2)  g/dL


 


Albumin   (3.4-5.0)  g/dL


 


Globulin   


 


Albumin/Globulin Ratio   


 


Urine Color   (YELLOW)  


 


Urine Appearance   (CLEAR)  


 


Urine pH   (5.0-9.0)  


 


Ur Specific Gravity   (1.005-1.030)  


 


Urine Protein   (NEGATIVE)  


 


Urine Glucose (UA)   (NEGATIVE)  


 


Urine Ketones   (NEGATIVE)  


 


Urine Occult Blood   (NEGATIVE)  


 


Urine Nitrite   (NEGATIVE)  


 


Urine Bilirubin   (NEGATIVE)  


 


Urine Urobilinogen   (0.2-1.0)  mg/dL


 


Ur Leukocyte Esterase   (NEGATIVE)  


 


Urine RBC   /HPF


 


Urine WBC   (0-5/HPF)  /HPF


 


Ur Epithelial Cells   (NOT SEEN)  /HPF


 


Amorphous Sediment   (NOT SEEN)  /HPF


 


Urine Bacteria   (0-FEW/HPF)  /HPF


 


Urine Mucus   (NOT SEEN)  /LPF











Meds: 


Medications














Discontinued Medications














Generic Name Dose Route Start Last Admin





  Trade Name Freq  PRN Reason Stop Dose Admin


 


Cephalexin  500 mg  21 03:11  21 03:21





  Cephalexin 500 Mg Cap  PO  21 03:12  500 mg





  ONETIME ONE   Administration














- Radiology Interpretation


Free Text/Narrative:: 





[facility] [place] [facility]


Final Radiology Report  Call: [phone]


assistance Online chat: [email or web address]


Name: FLORY CORDON Age: 75Years F Date: 2021


MRN: [id number] SSN: -- : [id number]


Study: CT ABDOMEN PELVIS WO CONT Requesting Physician: Rehana Elder


Accession: [id number] Images: [id number]


Addl Studies:


Provided Clinical History: r/o kidney stones


Contrast: Without Contrast Medium:


Contrast Amount: Contrast Method:


Page 1 of 2


PROCEDURE INFORMATION:


Exam: CT Abdomen And Pelvis Without Contrast


Exam date and time: 2021 12:36 AM


Age: 75 years old


Clinical indication: Other: Pain; Additional info: R/O kidney stones


TECHNIQUE:


Imaging protocol: Computed tomography of the abdomen and pelvis without 

contrast.


Radiation optimization: All CT scans at this facility use at least one of these 

dose optimization


techniques: automated exposure control; mA and/or kV adjustment per patient size

 (includes targeted


exams where dose is matched to clinical indication); or iterative 

reconstruction.


COMPARISON:


CT Abdomen Pelvis wo Cont 3/26/2019 10:21 AM


FINDINGS:


Lungs: Multiple gallstones again noted new para cardiomegaly is noted new para 

linear bands of


atelectasis in lung bases


Liver: Normal. No mass.


Gallbladder and bile ducts: Normal. No calcified stones. No ductal dilation.


Pancreas: Normal. No ductal dilation.


Spleen: Normal. No splenomegaly.


Adrenal glands: Normal. No mass.


Kidneys and ureters: There is now left percutaneous nephrostomy. Catheter is 

coiled in the


proximal left ureter. There is air in the left renal pelvis. No nephrolithiasis.

 No hydronephrosis.


Stomach and bowel: Unremarkable. No obstruction. No mucosal thickening.


Appendix: No evidence of appendicitis.


Intraperitoneal space: Unremarkable. No free air. No significant fluid 

collection.


Vasculature: Unremarkable. No abdominal aortic aneurysm.


Lymph nodes: Unremarkable. No enlarged lymph nodes.


Urinary bladder: Questionable thickening in urinary bladder. Air in urinary 

bladder


Reproductive: Unremarkable as visualized.


Bones/joints: Unremarkable. No acute fracture.


Soft tissues: Unremarkable.


IMPRESSION:


1. Air in left renal system and urinary bladder most likely due to recent 

procedure. Infection not


excluded. Correlate with urine analysis. Emphysematous pyelonephritis not 

excluded on this exam


2. Note left percutaneous nephrostomy is coiled into the proximal left ureter


3. Cholelithiasis


Thank you for allowing us to participate in the care of your patient.


Dictated and Authenticated by: Benji Núñez MD


2021 1:13 AM Central Time ([place] & [place])





- Re-Assessments/Exams


Free Text/Narrative Re-Assessment/Exam: 





21


UA positive for WBC and RBC, amorphous sediment, and leukocyte esterase.  Will 

obtain CT abdomen/pelvis. 





CT reveals coiled nephrostomy tube, as well as air in bladder and left kidney.  

Case discussed with Dr. Shultz, urologist at [facility] in [place], due to concern 

for emphysematous cystitis/pyelonephritis.  Given lack of systemic symptoms and 

limited evidence for significant infection via lab work, Dr. Shultz states the air

 in kidney and bladder likely due to malpositioned nephrostomy tube.  Patient to

 follow up with IR on Monday (in two days) for correction of tube placement.  





Findings of examination, lab work, imaging, and discussion with Dr. Shultz 

reviewed with patient and her daughter.  Will treat UTI with cephalexin 

empirically based on christie cultures.  Red flag signs and symptoms which would

 warrant immediate reevaluation reviewed.  Patient and daughter verbalized 

understanding and agreement with the plan of care. 








Departure





- Departure


Time of Disposition: 03:05


Disposition: Home, Self-Care 01


Condition: Good


Clinical Impression: 


 Acute cystitis with hematuria, Malfunction of nephrostomy tube, Nephrostomy 

tube displaced








- Discharge Information


*PRESCRIPTION DRUG MONITORING PROGRAM REVIEWED*: Not Applicable


*COPY OF PRESCRIPTION DRUG MONITORING REPORT IN PATIENT GUNJAN: Not Applicable


Instructions:  Urinary Tract Infection, Adult, Easy-to-Read


Referrals: 


PCP,None [Primary Care Provider] - 


Forms:  ED Department Discharge


Additional Instructions: 


Rx: cephalexin





1.) Follow up with interventional radiology at [facility] on Monday to schedule 

reposition of nephrostomy tube. 


2.) Take all of you antibiotic until it is gone, even as symptoms improve. 


3.) Follow up with primary care provider, or return to the emergency department,

should symptoms persist or worsen. 





Sepsis Event Note (ED)





- Evaluation


Sepsis Screening Result: No Definite Risk





- Focused Exam


Vital Signs: 


                                   Vital Signs











  Temp Pulse Resp BP Pulse Ox


 


 21 03:17  96.8 F L  55 L  19  134/73  100


 


 21 22:52  97.7 F  57 L  18  152/52 H  100














- My Orders


Last 24 Hours: 


My Active Orders





21 23:15


CULTURE URINE [RM] Stat 














- Assessment/Plan


Last 24 Hours: 


My Active Orders





21 23:15


CULTURE URINE [RM] Stat

## 2021-07-09 ENCOUNTER — HOSPITAL ENCOUNTER (INPATIENT)
Dept: HOSPITAL 43 - DL.ED | Age: 76
LOS: 2 days | Discharge: HOME | DRG: 389 | End: 2021-07-11
Attending: INTERNAL MEDICINE | Admitting: INTERNAL MEDICINE
Payer: MEDICARE

## 2021-07-09 DIAGNOSIS — E78.00: ICD-10-CM

## 2021-07-09 DIAGNOSIS — E83.39: ICD-10-CM

## 2021-07-09 DIAGNOSIS — E11.42: ICD-10-CM

## 2021-07-09 DIAGNOSIS — E78.5: ICD-10-CM

## 2021-07-09 DIAGNOSIS — E66.9: ICD-10-CM

## 2021-07-09 DIAGNOSIS — E11.40: ICD-10-CM

## 2021-07-09 DIAGNOSIS — K21.9: ICD-10-CM

## 2021-07-09 DIAGNOSIS — Z79.899: ICD-10-CM

## 2021-07-09 DIAGNOSIS — I25.10: ICD-10-CM

## 2021-07-09 DIAGNOSIS — Z98.890: ICD-10-CM

## 2021-07-09 DIAGNOSIS — Z88.0: ICD-10-CM

## 2021-07-09 DIAGNOSIS — Z79.01: ICD-10-CM

## 2021-07-09 DIAGNOSIS — I34.0: ICD-10-CM

## 2021-07-09 DIAGNOSIS — Z88.8: ICD-10-CM

## 2021-07-09 DIAGNOSIS — H40.9: ICD-10-CM

## 2021-07-09 DIAGNOSIS — Z89.512: ICD-10-CM

## 2021-07-09 DIAGNOSIS — D63.1: ICD-10-CM

## 2021-07-09 DIAGNOSIS — Z98.42: ICD-10-CM

## 2021-07-09 DIAGNOSIS — I50.9: ICD-10-CM

## 2021-07-09 DIAGNOSIS — Z95.5: ICD-10-CM

## 2021-07-09 DIAGNOSIS — K56.609: ICD-10-CM

## 2021-07-09 DIAGNOSIS — Z20.822: ICD-10-CM

## 2021-07-09 DIAGNOSIS — E11.65: ICD-10-CM

## 2021-07-09 DIAGNOSIS — I10: ICD-10-CM

## 2021-07-09 DIAGNOSIS — Z98.41: ICD-10-CM

## 2021-07-09 DIAGNOSIS — G89.29: ICD-10-CM

## 2021-07-09 DIAGNOSIS — E87.6: ICD-10-CM

## 2021-07-09 DIAGNOSIS — Z79.82: ICD-10-CM

## 2021-07-09 DIAGNOSIS — E87.2: ICD-10-CM

## 2021-07-09 DIAGNOSIS — K56.600: Primary | ICD-10-CM

## 2021-07-09 DIAGNOSIS — I27.20: ICD-10-CM

## 2021-07-09 DIAGNOSIS — E87.5: ICD-10-CM

## 2021-07-09 DIAGNOSIS — N39.0: ICD-10-CM

## 2021-07-09 DIAGNOSIS — E87.0: ICD-10-CM

## 2021-07-09 DIAGNOSIS — M19.90: ICD-10-CM

## 2021-07-09 DIAGNOSIS — K59.09: ICD-10-CM

## 2021-07-09 DIAGNOSIS — N32.81: ICD-10-CM

## 2021-07-09 DIAGNOSIS — Z95.1: ICD-10-CM

## 2021-07-09 DIAGNOSIS — I25.2: ICD-10-CM

## 2021-07-09 DIAGNOSIS — H54.7: ICD-10-CM

## 2021-07-09 DIAGNOSIS — Z88.5: ICD-10-CM

## 2021-07-09 DIAGNOSIS — E11.22: ICD-10-CM

## 2021-07-09 LAB
ANION GAP SERPL CALC-SCNC: 20.9 MEQ/L (ref 7–13)
CHLORIDE SERPL-SCNC: 111 MMOL/L (ref 98–107)
SODIUM SERPL-SCNC: 145 MMOL/L (ref 136–145)

## 2021-07-09 PROCEDURE — 96365 THER/PROPH/DIAG IV INF INIT: CPT

## 2021-07-09 PROCEDURE — 83735 ASSAY OF MAGNESIUM: CPT

## 2021-07-09 PROCEDURE — 87186 SC STD MICRODIL/AGAR DIL: CPT

## 2021-07-09 PROCEDURE — 99284 EMERGENCY DEPT VISIT MOD MDM: CPT

## 2021-07-09 PROCEDURE — 84484 ASSAY OF TROPONIN QUANT: CPT

## 2021-07-09 PROCEDURE — 82728 ASSAY OF FERRITIN: CPT

## 2021-07-09 PROCEDURE — C9113 INJ PANTOPRAZOLE SODIUM, VIA: HCPCS

## 2021-07-09 PROCEDURE — 83540 ASSAY OF IRON: CPT

## 2021-07-09 PROCEDURE — 80053 COMPREHEN METABOLIC PANEL: CPT

## 2021-07-09 PROCEDURE — 0D9670Z DRAINAGE OF STOMACH WITH DRAINAGE DEVICE, VIA NATURAL OR ARTIFICIAL OPENING: ICD-10-PCS | Performed by: INTERNAL MEDICINE

## 2021-07-09 PROCEDURE — 36415 COLL VENOUS BLD VENIPUNCTURE: CPT

## 2021-07-09 PROCEDURE — 99285 EMERGENCY DEPT VISIT HI MDM: CPT

## 2021-07-09 PROCEDURE — 82009 KETONE BODYS QUAL: CPT

## 2021-07-09 PROCEDURE — 83605 ASSAY OF LACTIC ACID: CPT

## 2021-07-09 PROCEDURE — 83690 ASSAY OF LIPASE: CPT

## 2021-07-09 PROCEDURE — U0002 COVID-19 LAB TEST NON-CDC: HCPCS

## 2021-07-09 PROCEDURE — 96375 TX/PRO/DX INJ NEW DRUG ADDON: CPT

## 2021-07-09 PROCEDURE — 74176 CT ABD & PELVIS W/O CONTRAST: CPT

## 2021-07-09 PROCEDURE — 85610 PROTHROMBIN TIME: CPT

## 2021-07-09 PROCEDURE — 87086 URINE CULTURE/COLONY COUNT: CPT

## 2021-07-09 PROCEDURE — 81001 URINALYSIS AUTO W/SCOPE: CPT

## 2021-07-09 PROCEDURE — 82150 ASSAY OF AMYLASE: CPT

## 2021-07-09 PROCEDURE — 85025 COMPLETE CBC W/AUTO DIFF WBC: CPT

## 2021-07-09 PROCEDURE — 87040 BLOOD CULTURE FOR BACTERIA: CPT

## 2021-07-09 PROCEDURE — 87088 URINE BACTERIA CULTURE: CPT

## 2021-07-09 PROCEDURE — 82272 OCCULT BLD FECES 1-3 TESTS: CPT

## 2021-07-09 PROCEDURE — 83550 IRON BINDING TEST: CPT

## 2021-07-09 NOTE — CR
PROCEDURE INFORMATION: 

Exam: XR Chest 

Exam date and time: 7/9/2021 11:15 PM 

Age: 75 years old 

Clinical indication: Other: Tube placement 



TECHNIQUE: 

Imaging protocol: XR of the chest. 

Views: 1 view. 



COMPARISON: 

CR Chest 1V Frontal 1/1/2020 6:03 PM 



FINDINGS: 

Tubes, catheters and devices: A nasogastric tube is been placed with its tip 

coiled in the proximal stomach. The distal tip is near the level the 

gastroesophageal junction. Percutaneous nephrostomy seen on the left. 



Lungs: There are strandy opacities present in the lung bases bilaterally 

compatible with atelectasis although a bilateral basilar pneumonia cannot be 

excluded. 

Pleural spaces: Unremarkable. No pleural effusion. No pneumothorax. 

Heart/Mediastinum: Unremarkable. No cardiomegaly. 

Bones/joints: Unremarkable. 



IMPRESSION: 

1. Nasogastric tube coiled in the proximal stomach the tip near the 

gastroesophageal junction. 

2. Percutaneous nephrostomy seen on the left. 

3. Strandy opacities present lung bases bilaterally likely represents 

atelectasis although bilateral basilar infiltrates and pneumonia cannot be 

excluded.

## 2021-07-09 NOTE — EDM.PDOC
ED HPI GENERAL MEDICAL PROBLEM





- General


Chief Complaint: Gastrointestinal Problem


Stated Complaint: ULCER IS ACTING UP, THROWING UP, DHIREA


Time Seen by Provider: 07/09/21 19:40


Source of Information: Reports: Patient, Family


History Limitations: Reports: No Limitations, Language Barrier





- History of Present Illness


INITIAL COMMENTS - FREE TEXT/NARRATIVE: 





ED with c/o abdominal pain bloating, nausea vomiting and diarrhea, 2 days prior 

black tinged watery stools. Emesis yellow watery. Mid abdominal pain, decreased 

appetite. No fever or chills. No prior hx abdominal surgeries.   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 

March, wound dehiscence wet dry dressing changes twice daily.  no drainage, 

Nephrostomy left, no change in urine, clear, no change in odor. 


  ** Abdomen


Pain Score (Numeric/FACES): 5





- Related Data


                                    Allergies











Allergy/AdvReac Type Severity Reaction Status Date / Time


 


gemfibrozil [From Lopid] Allergy  Cannot Verified 07/09/21 23:59





   Remember  


 


Penicillins Allergy  Rash Verified 07/09/21 23:59


 


propranolol HCl Allergy  Cannot Verified 07/09/21 23:59





[From Inderal LA]   Remember  


 


codeine AdvReac  Irritabilit Verified 07/09/21 23:59





   y  


 


metformin AdvReac  Shaking Verified 07/09/21 23:59











Home Meds: 


                                    Home Meds





Aspirin [Lo-Dose Aspirin EC] 81 mg PO DAILY 02/11/19 [History]


Clopidogrel [Plavix] 75 mg PO DAILY 02/11/19 [History]


Oxybutynin Chloride [Ditropan Xl] 10 mg PO BEDTIME 02/11/19 [History]


Ascorbic Acid 500 mg PO BIDMEALS 03/10/21 [History]


Brimonidine Tartrate/Timolol [Combigan 0.2%-0.5% Eye Drops] 1 drop EYELF BID 

03/10/21 [History]


Lactulose 30 ml PO BID PRN 03/10/21 [History]


Omeprazole 20 mg PO DAILY 03/10/21 [History]


Bumetanide [Bumex] 1 mg PO DAILY #30 tablet 03/19/21 [Rx]


Multivitamin with Minerals [Multiple Vitamin] 1 tab PO WITHBREAKFAST 07/09/21 

[History]


Potassium Chloride 15 ml PO DAILY 07/09/21 [History]


carvediloL [Carvedilol] 6.25 mg PO BIDMEALS 07/09/21 [History]











Past Medical History


HEENT History: Reports: Impaired Vision


Other HEENT History: wears glasses


Cardiovascular History: Reports: CAD, High Cholesterol, Hypertension, MI, Stents


Respiratory History: Reports: None


Gastrointestinal History: Reports: Chronic Constipation


Genitourinary History: Reports: UTI, Recurrent


OB/GYN History: Reports: Pregnancy


Musculoskeletal History: Reports: Osteoarthritis


Neurological History: Reports: Neuropathy, Diabetic


Psychiatric History: Reports: None


Endocrine/Metabolic History: Reports: Diabetes, Type II, Obesity/BMI 30+


Hematologic History: Reports: Anemia


Immunologic History: Reports: None


Oncologic (Cancer) History: Reports: None


Dermatologic History: Reports: None





- Infectious Disease History


Infectious Disease History: Reports: None





- Past Surgical History


Head Surgeries/Procedures: Reports: None


HEENT Surgical History: Reports: Cataract Surgery


Cardiovascular Surgical History: Reports: Coronary Artery Bypass


GI Surgical History: Reports: Hernia Repair/Other


Female  Surgical History: Reports: Other (See Below)


Other Female  Surgeries/Procedures: bladder surgery


Musculoskeletal Surgical History: Reports: Amputation, Other (See Below)


Other Musculoskeletal Surgeries/Procedures:: carpal tunnel surgery, amputation 

March 2nd left leg and June 10th right leg





Social & Family History





- Family History


Family Medical History: No Pertinent Family History





- Tobacco Use


Tobacco Use Status *Q: Never Tobacco User


Second Hand Smoke Exposure: No





- Caffeine Use


Caffeine Use: Reports: None





- Recreational Drug Use


Recreational Drug Use: No





- Living Situation & Occupation


Living situation: Reports: 


Occupation: Retired





ED ROS GENERAL





- Review of Systems


Review Of Systems: Comprehensive ROS is negative, except as noted in HPI.





ED EXAM, GI/ABD





- Physical Exam


Exam: See Below


Exam Limited By: No Limitations


General Appearance: Alert, Mild Distress


Ears: Normal External Exam, Hearing Grossly Normal


Nose: Normal Inspection


Throat/Mouth: Normal Inspection


Head: Atraumatic, Normocephalic


Neck: Normal Inspection


Respiratory/Chest: No Respiratory Distress, Lungs Clear


Cardiovascular: Normal Peripheral Pulses, Regular Rate, Rhythm


GI/Abdominal Exam: Distended, Tender (mid), Abnormal Bowel Sounds (tympanic URQ 

decreased lower)


Extremities: Other (bilateral lower extremity amputation)


Neurological: Alert, Oriented, Normal Cognition


Psychiatric: Normal Affect


Skin Exam: Warm, Dry, Wound/Incision (left BKA wound distal stump  chronic since

 march. dry, beefy base, lateral wound scant green crust.)





Course





- Vital Signs


Last Recorded V/S: 


                                Last Vital Signs











Temp  98.7 F   07/11/21 04:00


 


Pulse  64   07/11/21 04:00


 


Resp  16   07/11/21 04:00


 


BP  135/54 L  07/11/21 04:00


 


Pulse Ox  99   07/11/21 04:00














- Orders/Labs/Meds


Orders: 


                                Medication Orders





Acetaminophen (Acetaminophen 325 Mg Tab)  650 mg PO Q4H PRN


   PRN Reason: Pain (Mild 1-3)/fever


Aspirin (Aspirin 81 Mg Tab.Ec)  81 mg PO DAILY [facility]


   Last Admin: 07/10/21 08:19  Dose: 81 mg


   Documented by: KIMBERLY


Carvedilol (Carvedilol 6.25 Mg Tab)  6.25 mg PO BIDMEALS [facility]


   Last Admin: 07/10/21 17:33 Dose:  Not Given


   Documented by: TICO


   Admin: 07/10/21 08:21  Dose: 6.25 mg


   Documented by: KIMBERLY


Clopidogrel Bisulfate (Clopidogrel 75 Mg Tab)  75 mg PO DAILY [facility]


   Last Admin: 07/10/21 08:21  Dose: 75 mg


   Documented by: KIMBERLY


Dextrose/Water (50% Dextrose In Water 50 Ml Syringe)  50 ml IVPUSH Q15M PRN


   PRN Reason: Hypoglycemia


Glucagon (Glucagon,Human Recombinant 1 Mg Vial)  1 mg IM Q15M PRN


   PRN Reason: Hypoglycemia


Heparin Sodium (Porcine) (Heparin Sodium 5,000 Units/Ml Vial)  5,000 units 

SUBCUT Q12H [facility]


   Last Admin: 07/10/21 20:43  Dose: 5,000 units


   Documented by: FAINA


   Admin: 07/10/21 08:22  Dose: 5,000 units


   Documented by: KIMBERLY


Ceftriaxone Sodium 1 gm/ (Sodium Chloride)  50 mls @ 100 mls/hr IV Q24H [facility]


   Last Admin: 07/11/21 01:18  Dose: 100 mls/hr


   Documented by: FAINA


   Infusion: 07/10/21 01:48  Dose: 100 mls/hr


   Documented by: FAINA


   Admin: 07/10/21 01:18  Dose: 100 mls/hr


   Documented by: VANGIE


Sodium Chloride (Normal Saline)  1,000 mls @ 75 mls/hr IV ASDIRECTED [facility]


   Last Admin: 07/10/21 22:26  Dose: 75 mls/hr


   Documented by: FAINA


Insulin Human Lispro (Insulin Lispro 100 Units/Ml 3 Ml Vial)  0 unit SUBCUT Q6H 

[facility]; Protocol


   Last Admin: 07/11/21 04:43 Dose:  Not Given


   Documented by: FAINA


   Admin: 07/10/21 22:24 Dose:  Not Given


   Documented by: FAINA


Brimonidine Tartrate /Timolol (Combigan) 0.2%-0.5% Eye Drops **Own Med**  1 drop

 EYELF BID [facility]


   Last Admin: 07/10/21 20:44  Dose: 1 drop


   Documented by: FAINA


Omeprazole (Omeprazole 20 Mg Cap.Cr)  20 mg PO ACBREAKFAST [facility]


   Last Admin: 07/11/21 06:10  Dose: 20 mg


   Documented by: FAINA


   Admin: 07/10/21 08:22  Dose: 20 mg


   Documented by: KIMBERLY


Ondansetron HCl (Ondansetron 4 Mg/2 Ml Sdv)  4 mg IVPUSH Q4H PRN


   PRN Reason: Nausea/Vomiting


Oxybutynin Chloride (Oxybutynin 5 Mg Tab.Er)  10 mg PO BEDTIME [facility]


   Last Admin: 07/10/21 20:42  Dose: 10 mg


   Documented by: FAINA


Senna/Docusate Sodium (Docusate Sodium/Sennosides 50-8.6 Mg Tab)  2 tab PO BID 

[facility]


   Last Admin: 07/10/21 20:42  Dose: 2 tab


   Documented by: FAINA


   Admin: 07/10/21 08:21  Dose: 2 tab


   Documented by: KIMBERLY


   Admin: 07/10/21 01:22 Dose:  Not Given


   Documented by: VANGIE


Sodium Bicarbonate (Sodium Bicarbonate 650 Mg Tab)  650 mg PO TID [facility]


Sodium Chloride (Sodium Chloride 0.9% 10 Ml Syringe)  10 ml FLUSH ASDIRECTED PRN


   PRN Reason: Keep Vein Open








Labs: 


                                Laboratory Tests











  07/09/21 07/09/21 07/09/21 Range/Units





  19:25 19:25 19:25 


 


WBC  10.9 H    (5.0-10.0)  10^3/uL


 


RBC  3.39 L    (4.2-5.4)  10^6/uL


 


Hgb  10.3 L    (12.0-16.0)  g/dL


 


Hct  32.6 L    (37.0-47.0)  %


 


MCV  96.2  D    ()  fL


 


MCH  30.4    (27.0-34.0)  pg


 


MCHC  31.6 L    (33.0-35.0)  g/dL


 


Plt Count  394    (150-450)  10^3/uL


 


Neut % (Auto)  84.0 H    (42.2-75.2)  %


 


Lymph % (Auto)  11.8 L    (20.5-50.1)  %


 


Mono % (Auto)  3.7    (2-8)  %


 


Eos % (Auto)  0.2 L    (1.0-3.0)  %


 


Baso % (Auto)  0.3    (0.0-1.0)  %


 


PT   10.6   (9.0-12.0)  SEC


 


INR   1.1   (0.9-1.2)  


 


Sodium    145  (136-145)  mmol/L


 


Potassium    2.9 L  (3.5-5.1)  mmol/L


 


Chloride    111 H  ()  mmol/L


 


Carbon Dioxide    16 L  (21-32)  mmol/L


 


Anion Gap    20.9 H  (7-13)  mEq/L


 


BUN    39 H  (7-18)  mg/dL


 


Creatinine    1.94 H  (0.55-1.02)  mg/dL


 


Est Cr Clr Drug Dosing    TNP  


 


Estimated GFR (MDRD)    25  


 


BUN/Creatinine Ratio    20.1  (No establ ref range)  


 


Glucose    133 H  (70-99)  mg/dL


 


Lactic Acid     (0.4-2.0)  mmol/L


 


Calcium    9.1  (8.5-10.1)  mg/dL


 


Magnesium    2.3  (1.8-2.4)  mg/dL


 


Iron     ()  ug/dL


 


TIBC     (250-450)  ug/dL


 


% Saturation     (20.0-50.0)  %


 


Ferritin     (8-252)  mg/mL


 


Total Bilirubin    0.4  (0.2-1.0)  mg/dL


 


AST    17  (15-37)  U/L


 


ALT    12 L  (14-59)  U/L


 


Alkaline Phosphatase    106  ()  U/L


 


Troponin I High Sens     (<=51)  pg/mL


 


Total Protein    6.8  (6.4-8.2)  g/dL


 


Albumin    2.7 L  (3.4-5.0)  g/dL


 


Globulin    4.1  


 


Albumin/Globulin Ratio    0.66  


 


Amylase    21 L  ()  U/L


 


Lipase    69 L  ()  U/L


 


Urine Color     (YELLOW)  


 


Urine Appearance     (CLEAR)  


 


Urine pH     (5.0-9.0)  


 


Ur Specific Gravity     (1.005-1.030)  


 


Urine Protein     (NEGATIVE)  


 


Urine Glucose (UA)     (NEGATIVE)  


 


Urine Ketones     (NEGATIVE)  


 


Urine Occult Blood     (NEGATIVE)  


 


Urine Nitrite     (NEGATIVE)  


 


Urine Bilirubin     (NEGATIVE)  


 


Urine Urobilinogen     (0.2-1.0)  mg/dL


 


Ur Leukocyte Esterase     (NEGATIVE)  


 


Urine RBC     /HPF


 


Urine WBC     (0-5/HPF)  /HPF


 


Ur Epithelial Cells     (NOT SEEN)  /HPF


 


Urine Bacteria     (0-FEW/HPF)  /HPF


 


Ketones     


 


SARS-CoV-2 RNA (BERNADINE)     (NEGATIVE)  














  07/09/21 07/09/21 07/09/21 Range/Units





  19:25 19:25 19:25 


 


WBC     (5.0-10.0)  10^3/uL


 


RBC     (4.2-5.4)  10^6/uL


 


Hgb     (12.0-16.0)  g/dL


 


Hct     (37.0-47.0)  %


 


MCV     ()  fL


 


MCH     (27.0-34.0)  pg


 


MCHC     (33.0-35.0)  g/dL


 


Plt Count     (150-450)  10^3/uL


 


Neut % (Auto)     (42.2-75.2)  %


 


Lymph % (Auto)     (20.5-50.1)  %


 


Mono % (Auto)     (2-8)  %


 


Eos % (Auto)     (1.0-3.0)  %


 


Baso % (Auto)     (0.0-1.0)  %


 


PT     (9.0-12.0)  SEC


 


INR     (0.9-1.2)  


 


Sodium     (136-145)  mmol/L


 


Potassium     (3.5-5.1)  mmol/L


 


Chloride     ()  mmol/L


 


Carbon Dioxide     (21-32)  mmol/L


 


Anion Gap     (7-13)  mEq/L


 


BUN     (7-18)  mg/dL


 


Creatinine     (0.55-1.02)  mg/dL


 


Est Cr Clr Drug Dosing     


 


Estimated GFR (MDRD)     


 


BUN/Creatinine Ratio     (No establ ref range)  


 


Glucose     (70-99)  mg/dL


 


Lactic Acid  0.6    (0.4-2.0)  mmol/L


 


Calcium     (8.5-10.1)  mg/dL


 


Magnesium     (1.8-2.4)  mg/dL


 


Iron    27 L  ()  ug/dL


 


TIBC    172 L  (250-450)  ug/dL


 


% Saturation    15.7 L  (20.0-50.0)  %


 


Ferritin    981 H  (8-252)  mg/mL


 


Total Bilirubin     (0.2-1.0)  mg/dL


 


AST     (15-37)  U/L


 


ALT     (14-59)  U/L


 


Alkaline Phosphatase     ()  U/L


 


Troponin I High Sens   22   (<=51)  pg/mL


 


Total Protein     (6.4-8.2)  g/dL


 


Albumin     (3.4-5.0)  g/dL


 


Globulin     


 


Albumin/Globulin Ratio     


 


Amylase     ()  U/L


 


Lipase     ()  U/L


 


Urine Color     (YELLOW)  


 


Urine Appearance     (CLEAR)  


 


Urine pH     (5.0-9.0)  


 


Ur Specific Gravity     (1.005-1.030)  


 


Urine Protein     (NEGATIVE)  


 


Urine Glucose (UA)     (NEGATIVE)  


 


Urine Ketones     (NEGATIVE)  


 


Urine Occult Blood     (NEGATIVE)  


 


Urine Nitrite     (NEGATIVE)  


 


Urine Bilirubin     (NEGATIVE)  


 


Urine Urobilinogen     (0.2-1.0)  mg/dL


 


Ur Leukocyte Esterase     (NEGATIVE)  


 


Urine RBC     /HPF


 


Urine WBC     (0-5/HPF)  /HPF


 


Ur Epithelial Cells     (NOT SEEN)  /HPF


 


Urine Bacteria     (0-FEW/HPF)  /HPF


 


Ketones     


 


SARS-CoV-2 RNA (BERNADINE)     (NEGATIVE)  














  07/09/21 07/09/21 07/09/21 Range/Units





  19:25 21:41 22:48 


 


WBC     (5.0-10.0)  10^3/uL


 


RBC     (4.2-5.4)  10^6/uL


 


Hgb     (12.0-16.0)  g/dL


 


Hct     (37.0-47.0)  %


 


MCV     ()  fL


 


MCH     (27.0-34.0)  pg


 


MCHC     (33.0-35.0)  g/dL


 


Plt Count     (150-450)  10^3/uL


 


Neut % (Auto)     (42.2-75.2)  %


 


Lymph % (Auto)     (20.5-50.1)  %


 


Mono % (Auto)     (2-8)  %


 


Eos % (Auto)     (1.0-3.0)  %


 


Baso % (Auto)     (0.0-1.0)  %


 


PT     (9.0-12.0)  SEC


 


INR     (0.9-1.2)  


 


Sodium     (136-145)  mmol/L


 


Potassium     (3.5-5.1)  mmol/L


 


Chloride     ()  mmol/L


 


Carbon Dioxide     (21-32)  mmol/L


 


Anion Gap     (7-13)  mEq/L


 


BUN     (7-18)  mg/dL


 


Creatinine     (0.55-1.02)  mg/dL


 


Est Cr Clr Drug Dosing     


 


Estimated GFR (MDRD)     


 


BUN/Creatinine Ratio     (No establ ref range)  


 


Glucose     (70-99)  mg/dL


 


Lactic Acid     (0.4-2.0)  mmol/L


 


Calcium     (8.5-10.1)  mg/dL


 


Magnesium     (1.8-2.4)  mg/dL


 


Iron     ()  ug/dL


 


TIBC     (250-450)  ug/dL


 


% Saturation     (20.0-50.0)  %


 


Ferritin     (8-252)  mg/mL


 


Total Bilirubin     (0.2-1.0)  mg/dL


 


AST     (15-37)  U/L


 


ALT     (14-59)  U/L


 


Alkaline Phosphatase     ()  U/L


 


Troponin I High Sens     (<=51)  pg/mL


 


Total Protein     (6.4-8.2)  g/dL


 


Albumin     (3.4-5.0)  g/dL


 


Globulin     


 


Albumin/Globulin Ratio     


 


Amylase     ()  U/L


 


Lipase     ()  U/L


 


Urine Color   Yellow   (YELLOW)  


 


Urine Appearance   Turbid   (CLEAR)  


 


Urine pH   5.5   (5.0-9.0)  


 


Ur Specific Gravity   >= 1.030   (1.005-1.030)  


 


Urine Protein   100 H   (NEGATIVE)  


 


Urine Glucose (UA)   Negative   (NEGATIVE)  


 


Urine Ketones   Negative   (NEGATIVE)  


 


Urine Occult Blood   Trace-intact H   (NEGATIVE)  


 


Urine Nitrite   Negative   (NEGATIVE)  


 


Urine Bilirubin   Negative   (NEGATIVE)  


 


Urine Urobilinogen   0.2   (0.2-1.0)  mg/dL


 


Ur Leukocyte Esterase   Large H   (NEGATIVE)  


 


Urine RBC   Not seen   /HPF


 


Urine WBC   Packed H   (0-5/HPF)  /HPF


 


Ur Epithelial Cells   Occasional   (NOT SEEN)  /HPF


 


Urine Bacteria   Many H   (0-FEW/HPF)  /HPF


 


Ketones  Small-20 mg/dl    


 


SARS-CoV-2 RNA (BERNADINE)    Negative  (NEGATIVE)  











Meds: 


Medications











Generic Name Dose Route Start Last Admin





  Trade Name Leda  PRN Reason Stop Dose Admin


 


Acetaminophen  650 mg  07/10/21 00:43 





  Acetaminophen 325 Mg Tab  PO  





  Q4H PRN  





  Pain (Mild 1-3)/fever  


 


Aspirin  81 mg  07/10/21 09:00  07/10/21 08:19





  Aspirin 81 Mg Tab.Ec  PO   81 mg





  DAILY [facility]   Administration


 


Carvedilol  6.25 mg  07/10/21 08:00  07/10/21 17:33





  Carvedilol 6.25 Mg Tab  PO   Not Given





  BIDMEALS [facility]  


 


Clopidogrel Bisulfate  75 mg  07/10/21 09:00  07/10/21 08:21





  Clopidogrel 75 Mg Tab  PO   75 mg





  DAILY [facility]   Administration


 


Dextrose/Water  50 ml  07/10/21 22:00 





  50% Dextrose In Water 50 Ml Syringe  IVPUSH  





  Q15M PRN  





  Hypoglycemia  


 


Glucagon  1 mg  07/10/21 22:00 





  Glucagon,Human Recombinant 1 Mg Vial  IM  





  Q15M PRN  





  Hypoglycemia  


 


Heparin Sodium (Porcine)  5,000 units  07/10/21 09:00  07/10/21 20:43





  Heparin Sodium 5,000 Units/Ml Vial  SUBCUT   5,000 units





  Q12H [facility]   Administration


 


Ceftriaxone Sodium 1 gm/  50 mls @ 100 mls/hr  07/10/21 01:00  07/11/21 01:18





  Sodium Chloride  IV   100 mls/hr





  Q24H [facility]   Administration


 


Sodium Chloride  1,000 mls @ 75 mls/hr  07/10/21 22:00  07/10/21 22:26





  Normal Saline  IV   75 mls/hr





  ASDIRECTED [facility]   Administration


 


Insulin Human Lispro  0 unit  07/10/21 22:00  07/11/21 04:43





  Insulin Lispro 100 Units/Ml 3 Ml Vial  SUBCUT   Not Given





  Q6H [facility]  





  Protocol  


 


Brimonidine Tartrate  1 drop  07/10/21 21:00  07/10/21 20:44





/Timolol (Combigan)  EYELF   1 drop





0.2%-0.5% Eye Drops  BID [facility]   Administration





**Own Med**   


 


Omeprazole  20 mg  07/10/21 08:00  07/11/21 06:10





  Omeprazole 20 Mg Cap.Cr  PO   20 mg





  ACBREAKFAST [facility]   Administration


 


Ondansetron HCl  4 mg  07/10/21 00:43 





  Ondansetron 4 Mg/2 Ml Sdv  IVPUSH  





  Q4H PRN  





  Nausea/Vomiting  


 


Oxybutynin Chloride  10 mg  07/10/21 21:00  07/10/21 20:42





  Oxybutynin 5 Mg Tab.Er  PO   10 mg





  BEDTIME [facility]   Administration


 


Senna/Docusate Sodium  2 tab  07/10/21 01:00  07/10/21 20:42





  Docusate Sodium/Sennosides 50-8.6 Mg Tab  PO   2 tab





  BID [facility]   Administration


 


Sodium Bicarbonate  650 mg  07/11/21 09:00 





  Sodium Bicarbonate 650 Mg Tab  PO  





  TID [facility]  


 


Sodium Chloride  10 ml  07/10/21 00:43 





  Sodium Chloride 0.9% 10 Ml Syringe  FLUSH  





  ASDIRECTED PRN  





  Keep Vein Open  














Discontinued Medications














Generic Name Dose Route Start Last Admin





  Trade Name Freq  PRN Reason Stop Dose Admin


 


Dextrose/Water  100 ml  07/10/21 08:29  07/10/21 09:22





  50% Dextrose In Water 50 Ml Syringe  IVPUSH  07/10/21 08:30  100 ml





  ONETIME ONE   Administration


 


Dextrose/Water  50 ml  07/10/21 21:23  07/10/21 21:30





  50% Dextrose In Water 50 Ml Syringe  IVPUSH  07/10/21 21:24  50 ml





  ONETIME ONE   Administration


 


Dextrose/Water  Confirm  07/10/21 21:22  07/10/21 21:37





  50% Dextrose In Water 50 Ml Syringe  Administered  07/10/21 21:23  Not Given





  Dose  





  50 ml  





  .ROUTE  





  .STK-MED ONE  


 


Heparin Sodium (Porcine)  5,000 units  07/10/21 00:45  07/10/21 02:10





  Heparin Sodium 5,000 Units/Ml Vial  SUBCUT   Not Given





  Q12H [facility]  


 


Potassium Chloride 10 meq/  100 mls @ 100 mls/hr  07/09/21 21:36  07/09/21 21:44





  Premix  IV  07/09/21 22:35  100 mls/hr





  ONETIME ONE   Administration


 


Sodium Chloride  1,000 mls @ 50 mls/hr  07/09/21 21:36  07/10/21 08:09





  Normal Saline  IV  07/10/21 17:35  75 mls/hr





  .BOLUS ONE   Infusion


 


Sodium Chloride  1,000 mls @ 75 mls/hr  07/10/21 00:45 





  Normal Saline  IV  





  ASDIRECTED [facility]  


 


Potassium Chloride 20 meq/  100 mls @ 50 mls/hr  07/10/21 01:00  07/10/21 06:06





  Premix  IV  07/10/21 06:59  50 mls/hr





  Q2H [facility]   Administration


 


Insulin Regular in 0.9 % NACL  100 unit in 100 mls @ 2.5 drops/hr  07/10/21 

01:00 





  Myxredlin In Ns 100 Unit/100 Ml  IV  





  CONTINUOUS [facility]  





  Protocol  


 


Sodium Chloride  1,000 mls @ 50 mls/hr  07/10/21 07:45 





  Sodium Chloride 0.45%  IV  





  ASDIRECTED [facility]  


 


Insulin Regular in 0.9 % NACL  100 unit in 100 mls @ 2.5 mls/hr  07/10/21 07:45 

 07/10/21 21:24





  Myxredlin In Ns 100 Unit/100 Ml  IV   0 mls/hr





  CONTINUOUS [facility]   0 mls/hr





    Titration





  Protocol  


 


Dextrose/Sodium Chloride  1,000 mls @ 75 mls/hr  07/10/21 08:00  07/10/21 20:41





  Dextrose 5%-1/2 Ns  IV   75 mls/hr





  ASDIRECTED [facility]   Administration


 


Potassium Chloride 20 meq/  100 mls @ 50 mls/hr  07/10/21 17:00  07/10/21 20:02





  Premix  IV  07/10/21 20:59  50 mls/hr





  Q2H [facility]   Administration


 


Ondansetron HCl  4 mg  07/09/21 20:26  07/09/21 20:40





  Ondansetron 4 Mg/2 Ml Sdv  IVPUSH  07/09/21 20:27  4 mg





  ONETIME ONE   Administration


 


Pantoprazole Sodium  40 mg  07/09/21 20:26  07/09/21 20:43





  Pantoprazole 40 Mg Vial  IVPUSH  07/09/21 20:27  40 mg





  ONETIME ONE   Administration


 


Potassium Chloride  40 meq  07/10/21 13:00  07/10/21 18:19





  Potassium Chloride 10 Meq Tab.Er  PO  07/10/21 15:01  Not Given





  Q1H LULA  


 


Potassium Chloride  40 meq  07/10/21 18:00  07/10/21 18:19





  Potassium Chloride 10 Meq Tab.Er  PO  07/10/21 18:01  40 meq





  Q1H LULA   Administration


 


Sodium Bicarbonate  650 mg  07/10/21 13:00  07/10/21 20:42





  Sodium Bicarbonate 650 Mg Tab  PO   650 mg





  BID LULA   Administration














- Re-Assessments/Exams


Free Text/Narrative Re-Assessment/Exam: 





07/09/21 22:08


large explosive watery brown stool with rectal exam. no stool in vault, Stool 

Hemoccult negative. Pain improved. 


 2250


BS hyperactive, upper quads, distended. mild mid epigastric tenderness with 

palpation. 





Departure





- Departure


Time of Disposition: 23:00


Disposition: Admitted As Inpatient 65


Condition: Fair


Clinical Impression: 


 SBO (small bowel obstruction), Hyperglycemia, Hypokalemia





Abdominal pain


Qualifiers:


 Abdominal location: generalized Qualified Code(s): R10.84 - Generalized 

abdominal pain








- Discharge Information


*PRESCRIPTION DRUG MONITORING PROGRAM REVIEWED*: No


*COPY OF PRESCRIPTION DRUG MONITORING REPORT IN PATIENT GUNJAN: No





Sepsis Event Note (ED)





- Evaluation


Sepsis Screening Result: No Definite Risk

## 2021-07-10 LAB
ANION GAP SERPL CALC-SCNC: 18.3 MEQ/L (ref 7–13)
ANION GAP SERPL CALC-SCNC: 18.8 MEQ/L (ref 7–13)
ANION GAP SERPL CALC-SCNC: 19.6 MEQ/L (ref 7–13)
ANION GAP SERPL CALC-SCNC: 20.1 MEQ/L (ref 7–13)
CHLORIDE SERPL-SCNC: 113 MMOL/L (ref 98–107)
CHLORIDE SERPL-SCNC: 114 MMOL/L (ref 98–107)
CHLORIDE SERPL-SCNC: 114 MMOL/L (ref 98–107)
CHLORIDE SERPL-SCNC: 115 MMOL/L (ref 98–107)
SODIUM SERPL-SCNC: 144 MMOL/L (ref 136–145)
SODIUM SERPL-SCNC: 145 MMOL/L (ref 136–145)
SODIUM SERPL-SCNC: 145 MMOL/L (ref 136–145)
SODIUM SERPL-SCNC: 146 MMOL/L (ref 136–145)

## 2021-07-10 RX ADMIN — Medication SCH DROP: at 20:44

## 2021-07-10 RX ADMIN — POTASSIUM CHLORIDE SCH MEQ: 750 TABLET, FILM COATED, EXTENDED RELEASE ORAL at 13:47

## 2021-07-10 RX ADMIN — HEPARIN SODIUM SCH UNITS: 5000 INJECTION, SOLUTION INTRAVENOUS; SUBCUTANEOUS at 20:43

## 2021-07-10 RX ADMIN — LIDOCAINE HYDROCHLORIDE SCH MLS/HR: 10 INJECTION, SOLUTION EPIDURAL; INFILTRATION; INTRACAUDAL; PERINEURAL at 20:02

## 2021-07-10 RX ADMIN — LIDOCAINE HYDROCHLORIDE SCH MLS/HR: 10 INJECTION, SOLUTION EPIDURAL; INFILTRATION; INTRACAUDAL; PERINEURAL at 01:59

## 2021-07-10 RX ADMIN — LIDOCAINE HYDROCHLORIDE SCH MLS/HR: 10 INJECTION, SOLUTION EPIDURAL; INFILTRATION; INTRACAUDAL; PERINEURAL at 04:06

## 2021-07-10 RX ADMIN — POTASSIUM CHLORIDE SCH MEQ: 750 TABLET, FILM COATED, EXTENDED RELEASE ORAL at 16:24

## 2021-07-10 RX ADMIN — POTASSIUM CHLORIDE SCH: 750 TABLET, FILM COATED, EXTENDED RELEASE ORAL at 18:19

## 2021-07-10 RX ADMIN — LIDOCAINE HYDROCHLORIDE SCH MLS/HR: 10 INJECTION, SOLUTION EPIDURAL; INFILTRATION; INTRACAUDAL; PERINEURAL at 06:06

## 2021-07-10 RX ADMIN — HEPARIN SODIUM SCH UNITS: 5000 INJECTION, SOLUTION INTRAVENOUS; SUBCUTANEOUS at 08:22

## 2021-07-10 RX ADMIN — LIDOCAINE HYDROCHLORIDE SCH MLS/HR: 10 INJECTION, SOLUTION EPIDURAL; INFILTRATION; INTRACAUDAL; PERINEURAL at 17:47

## 2021-07-10 RX ADMIN — OMEPRAZOLE SCH MG: 20 CAPSULE, DELAYED RELEASE ORAL at 08:22

## 2021-07-10 NOTE — PCM.PN
- General Info


Date of Service: 07/10/21


Subjective Update: 





Patient endorses no complaints at this time.  She denies fever, rigors, nausea, 

vomiting, cough, wheeze, abdominal pain, chest pain, dyspnea, or any other 

constitutional complaints.  She indicates that she had a bowel movement after my

encounter with her early this morning.  She inquires about being able to drink 

water.  I explained to the patient her current medical condition and plan of 

care and I have answered all of her questions


Functional Status: Reports: Pain Controlled





- Review of Systems


General: Reports: No Symptoms


HEENT: Reports: No Symptoms


Pulmonary: Reports: No Symptoms


Cardiovascular: Reports: No Symptoms


Gastrointestinal: Reports: No Symptoms


Genitourinary: Reports: No Symptoms


Musculoskeletal: Reports: No Symptoms


Skin: Reports: No Symptoms


Neurological: Reports: No Symptoms


Psychiatric: Reports: No Symptoms





- Patient Data


Vitals - Most Recent: 


                                Last Vital Signs











Temp  97.5 F   07/10/21 04:00


 


Pulse  61   07/10/21 04:00


 


Resp  20   07/10/21 04:00


 


BP  122/58 L  07/10/21 04:00


 


Pulse Ox  100   07/10/21 04:00











Weight - Most Recent: 129 lb 6.4 oz


I&O - Last 24 Hours: 


                                 Intake & Output











 07/09/21 07/10/21 07/10/21





 22:59 06:59 14:59


 


Output Total  635 


 


Balance  -635 











Lab Results Last 24 Hours: 


                         Laboratory Results - last 24 hr











  07/09/21 07/09/21 07/09/21 Range/Units





  19:25 19:25 19:25 


 


WBC  10.9 H    (5.0-10.0)  10^3/uL


 


RBC  3.39 L    (4.2-5.4)  10^6/uL


 


Hgb  10.3 L    (12.0-16.0)  g/dL


 


Hct  32.6 L    (37.0-47.0)  %


 


MCV  96.2  D    ()  fL


 


MCH  30.4    (27.0-34.0)  pg


 


MCHC  31.6 L    (33.0-35.0)  g/dL


 


Plt Count  394    (150-450)  10^3/uL


 


Neut % (Auto)  84.0 H    (42.2-75.2)  %


 


Lymph % (Auto)  11.8 L    (20.5-50.1)  %


 


Mono % (Auto)  3.7    (2-8)  %


 


Eos % (Auto)  0.2 L    (1.0-3.0)  %


 


Baso % (Auto)  0.3    (0.0-1.0)  %


 


PT   10.6   (9.0-12.0)  SEC


 


INR   1.1   (0.9-1.2)  


 


Sodium    145  (136-145)  mmol/L


 


Potassium    2.9 L  (3.5-5.1)  mmol/L


 


Chloride    111 H  ()  mmol/L


 


Carbon Dioxide    16 L  (21-32)  mmol/L


 


Anion Gap    20.9 H  (7-13)  mEq/L


 


BUN    39 H  (7-18)  mg/dL


 


Creatinine    1.94 H  (0.55-1.02)  mg/dL


 


Est Cr Clr Drug Dosing    TNP  


 


Estimated GFR (MDRD)    25  


 


BUN/Creatinine Ratio    20.1  (No establ ref range)  


 


Glucose    133 H  (70-99)  mg/dL


 


Lactic Acid     (0.4-2.0)  mmol/L


 


Calcium    9.1  (8.5-10.1)  mg/dL


 


Magnesium    2.3  (1.8-2.4)  mg/dL


 


Iron     ()  ug/dL


 


TIBC     (250-450)  ug/dL


 


% Saturation     (20.0-50.0)  %


 


Ferritin     (8-252)  mg/mL


 


Total Bilirubin    0.4  (0.2-1.0)  mg/dL


 


AST    17  (15-37)  U/L


 


ALT    12 L  (14-59)  U/L


 


Alkaline Phosphatase    106  ()  U/L


 


Troponin I High Sens     (<=51)  pg/mL


 


Total Protein    6.8  (6.4-8.2)  g/dL


 


Albumin    2.7 L  (3.4-5.0)  g/dL


 


Globulin    4.1  


 


Albumin/Globulin Ratio    0.66  


 


Amylase    21 L  ()  U/L


 


Lipase    69 L  ()  U/L


 


Urine Color     (YELLOW)  


 


Urine Appearance     (CLEAR)  


 


Urine pH     (5.0-9.0)  


 


Ur Specific Gravity     (1.005-1.030)  


 


Urine Protein     (NEGATIVE)  


 


Urine Glucose (UA)     (NEGATIVE)  


 


Urine Ketones     (NEGATIVE)  


 


Urine Occult Blood     (NEGATIVE)  


 


Urine Nitrite     (NEGATIVE)  


 


Urine Bilirubin     (NEGATIVE)  


 


Urine Urobilinogen     (0.2-1.0)  mg/dL


 


Ur Leukocyte Esterase     (NEGATIVE)  


 


Urine RBC     /HPF


 


Urine WBC     (0-5/HPF)  /HPF


 


Ur Epithelial Cells     (NOT SEEN)  /HPF


 


Urine Bacteria     (0-FEW/HPF)  /HPF


 


Ketones     


 


SARS-CoV-2 RNA (BERNADINE)     (NEGATIVE)  














  07/09/21 07/09/21 07/09/21 Range/Units





  19:25 19:25 19:25 


 


WBC     (5.0-10.0)  10^3/uL


 


RBC     (4.2-5.4)  10^6/uL


 


Hgb     (12.0-16.0)  g/dL


 


Hct     (37.0-47.0)  %


 


MCV     ()  fL


 


MCH     (27.0-34.0)  pg


 


MCHC     (33.0-35.0)  g/dL


 


Plt Count     (150-450)  10^3/uL


 


Neut % (Auto)     (42.2-75.2)  %


 


Lymph % (Auto)     (20.5-50.1)  %


 


Mono % (Auto)     (2-8)  %


 


Eos % (Auto)     (1.0-3.0)  %


 


Baso % (Auto)     (0.0-1.0)  %


 


PT     (9.0-12.0)  SEC


 


INR     (0.9-1.2)  


 


Sodium     (136-145)  mmol/L


 


Potassium     (3.5-5.1)  mmol/L


 


Chloride     ()  mmol/L


 


Carbon Dioxide     (21-32)  mmol/L


 


Anion Gap     (7-13)  mEq/L


 


BUN     (7-18)  mg/dL


 


Creatinine     (0.55-1.02)  mg/dL


 


Est Cr Clr Drug Dosing     


 


Estimated GFR (MDRD)     


 


BUN/Creatinine Ratio     (No establ ref range)  


 


Glucose     (70-99)  mg/dL


 


Lactic Acid  0.6    (0.4-2.0)  mmol/L


 


Calcium     (8.5-10.1)  mg/dL


 


Magnesium     (1.8-2.4)  mg/dL


 


Iron    27 L  ()  ug/dL


 


TIBC    172 L  (250-450)  ug/dL


 


% Saturation    15.7 L  (20.0-50.0)  %


 


Ferritin    981 H  (8-252)  mg/mL


 


Total Bilirubin     (0.2-1.0)  mg/dL


 


AST     (15-37)  U/L


 


ALT     (14-59)  U/L


 


Alkaline Phosphatase     ()  U/L


 


Troponin I High Sens   22   (<=51)  pg/mL


 


Total Protein     (6.4-8.2)  g/dL


 


Albumin     (3.4-5.0)  g/dL


 


Globulin     


 


Albumin/Globulin Ratio     


 


Amylase     ()  U/L


 


Lipase     ()  U/L


 


Urine Color     (YELLOW)  


 


Urine Appearance     (CLEAR)  


 


Urine pH     (5.0-9.0)  


 


Ur Specific Gravity     (1.005-1.030)  


 


Urine Protein     (NEGATIVE)  


 


Urine Glucose (UA)     (NEGATIVE)  


 


Urine Ketones     (NEGATIVE)  


 


Urine Occult Blood     (NEGATIVE)  


 


Urine Nitrite     (NEGATIVE)  


 


Urine Bilirubin     (NEGATIVE)  


 


Urine Urobilinogen     (0.2-1.0)  mg/dL


 


Ur Leukocyte Esterase     (NEGATIVE)  


 


Urine RBC     /HPF


 


Urine WBC     (0-5/HPF)  /HPF


 


Ur Epithelial Cells     (NOT SEEN)  /HPF


 


Urine Bacteria     (0-FEW/HPF)  /HPF


 


Ketones     


 


SARS-CoV-2 RNA (BERNADINE)     (NEGATIVE)  














  07/09/21 07/09/21 07/09/21 Range/Units





  19:25 21:41 22:48 


 


WBC     (5.0-10.0)  10^3/uL


 


RBC     (4.2-5.4)  10^6/uL


 


Hgb     (12.0-16.0)  g/dL


 


Hct     (37.0-47.0)  %


 


MCV     ()  fL


 


MCH     (27.0-34.0)  pg


 


MCHC     (33.0-35.0)  g/dL


 


Plt Count     (150-450)  10^3/uL


 


Neut % (Auto)     (42.2-75.2)  %


 


Lymph % (Auto)     (20.5-50.1)  %


 


Mono % (Auto)     (2-8)  %


 


Eos % (Auto)     (1.0-3.0)  %


 


Baso % (Auto)     (0.0-1.0)  %


 


PT     (9.0-12.0)  SEC


 


INR     (0.9-1.2)  


 


Sodium     (136-145)  mmol/L


 


Potassium     (3.5-5.1)  mmol/L


 


Chloride     ()  mmol/L


 


Carbon Dioxide     (21-32)  mmol/L


 


Anion Gap     (7-13)  mEq/L


 


BUN     (7-18)  mg/dL


 


Creatinine     (0.55-1.02)  mg/dL


 


Est Cr Clr Drug Dosing     


 


Estimated GFR (MDRD)     


 


BUN/Creatinine Ratio     (No establ ref range)  


 


Glucose     (70-99)  mg/dL


 


Lactic Acid     (0.4-2.0)  mmol/L


 


Calcium     (8.5-10.1)  mg/dL


 


Magnesium     (1.8-2.4)  mg/dL


 


Iron     ()  ug/dL


 


TIBC     (250-450)  ug/dL


 


% Saturation     (20.0-50.0)  %


 


Ferritin     (8-252)  mg/mL


 


Total Bilirubin     (0.2-1.0)  mg/dL


 


AST     (15-37)  U/L


 


ALT     (14-59)  U/L


 


Alkaline Phosphatase     ()  U/L


 


Troponin I High Sens     (<=51)  pg/mL


 


Total Protein     (6.4-8.2)  g/dL


 


Albumin     (3.4-5.0)  g/dL


 


Globulin     


 


Albumin/Globulin Ratio     


 


Amylase     ()  U/L


 


Lipase     ()  U/L


 


Urine Color   Yellow   (YELLOW)  


 


Urine Appearance   Turbid   (CLEAR)  


 


Urine pH   5.5   (5.0-9.0)  


 


Ur Specific Gravity   >= 1.030   (1.005-1.030)  


 


Urine Protein   100 H   (NEGATIVE)  


 


Urine Glucose (UA)   Negative   (NEGATIVE)  


 


Urine Ketones   Negative   (NEGATIVE)  


 


Urine Occult Blood   Trace-intact H   (NEGATIVE)  


 


Urine Nitrite   Negative   (NEGATIVE)  


 


Urine Bilirubin   Negative   (NEGATIVE)  


 


Urine Urobilinogen   0.2   (0.2-1.0)  mg/dL


 


Ur Leukocyte Esterase   Large H   (NEGATIVE)  


 


Urine RBC   Not seen   /HPF


 


Urine WBC   Packed H   (0-5/HPF)  /HPF


 


Ur Epithelial Cells   Occasional   (NOT SEEN)  /HPF


 


Urine Bacteria   Many H   (0-FEW/HPF)  /HPF


 


Ketones  Small-20 mg/dl    


 


SARS-CoV-2 RNA (BERNADINE)    Negative  (NEGATIVE)  














  07/10/21 07/10/21 Range/Units





  06:20 06:20 


 


WBC  12.4 H   (5.0-10.0)  10^3/uL


 


RBC  3.11 L   (4.2-5.4)  10^6/uL


 


Hgb  9.6 L   (12.0-16.0)  g/dL


 


Hct  29.8 L   (37.0-47.0)  %


 


MCV  95.8   ()  fL


 


MCH  30.9   (27.0-34.0)  pg


 


MCHC  32.2 L   (33.0-35.0)  g/dL


 


Plt Count  346   (150-450)  10^3/uL


 


Neut % (Auto)  78.7 H   (42.2-75.2)  %


 


Lymph % (Auto)  10.4 L   (20.5-50.1)  %


 


Mono % (Auto)  10.3 H   (2-8)  %


 


Eos % (Auto)  0.3 L   (1.0-3.0)  %


 


Baso % (Auto)  0.3   (0.0-1.0)  %


 


PT    (9.0-12.0)  SEC


 


INR    (0.9-1.2)  


 


Sodium   146 H  (136-145)  mmol/L


 


Potassium   3.6  (3.5-5.1)  mmol/L


 


Chloride   114 H  ()  mmol/L


 


Carbon Dioxide   16 L  (21-32)  mmol/L


 


Anion Gap   19.6 H  (7-13)  mEq/L


 


BUN   40 H  (7-18)  mg/dL


 


Creatinine   1.94 H  (0.55-1.02)  mg/dL


 


Est Cr Clr Drug Dosing   20.41  


 


Estimated GFR (MDRD)   25  


 


BUN/Creatinine Ratio   20.6  (No establ ref range)  


 


Glucose   81  (70-99)  mg/dL


 


Lactic Acid    (0.4-2.0)  mmol/L


 


Calcium   8.4 L  (8.5-10.1)  mg/dL


 


Magnesium    (1.8-2.4)  mg/dL


 


Iron    ()  ug/dL


 


TIBC    (250-450)  ug/dL


 


% Saturation    (20.0-50.0)  %


 


Ferritin    (8-252)  mg/mL


 


Total Bilirubin   0.3  (0.2-1.0)  mg/dL


 


AST   15  (15-37)  U/L


 


ALT   12 L  (14-59)  U/L


 


Alkaline Phosphatase   90  ()  U/L


 


Troponin I High Sens    (<=51)  pg/mL


 


Total Protein   5.8 L  (6.4-8.2)  g/dL


 


Albumin   2.1 L  (3.4-5.0)  g/dL


 


Globulin   3.7  


 


Albumin/Globulin Ratio   0.57  


 


Amylase    ()  U/L


 


Lipase    ()  U/L


 


Urine Color    (YELLOW)  


 


Urine Appearance    (CLEAR)  


 


Urine pH    (5.0-9.0)  


 


Ur Specific Gravity    (1.005-1.030)  


 


Urine Protein    (NEGATIVE)  


 


Urine Glucose (UA)    (NEGATIVE)  


 


Urine Ketones    (NEGATIVE)  


 


Urine Occult Blood    (NEGATIVE)  


 


Urine Nitrite    (NEGATIVE)  


 


Urine Bilirubin    (NEGATIVE)  


 


Urine Urobilinogen    (0.2-1.0)  mg/dL


 


Ur Leukocyte Esterase    (NEGATIVE)  


 


Urine RBC    /HPF


 


Urine WBC    (0-5/HPF)  /HPF


 


Ur Epithelial Cells    (NOT SEEN)  /HPF


 


Urine Bacteria    (0-FEW/HPF)  /HPF


 


Ketones    


 


SARS-CoV-2 RNA (BERNADINE)    (NEGATIVE)  











[facility]dulaziz Results Last 24 Hours: 


                                  Microbiology











 07/09/21 20:15 Stool Occult Blood (ABDULAZIZ) - Final





 Stool / Feces    NEGATIVE OCCULT BLOOD





    REFERENCE RANGE: NEGATIVE


 


 07/09/21 19:25 Anaerobic Blood Culture - Final





 Blood - Arm, Left 











Med Orders - Current: 


                               Current Medications





Acetaminophen (Acetaminophen 325 Mg Tab)  650 mg PO Q4H PRN


   PRN Reason: Pain (Mild 1-3)/fever


Aspirin (Aspirin 81 Mg Tab.Ec)  81 mg PO DAILY [facility]


Clopidogrel Bisulfate (Clopidogrel 75 Mg Tab)  75 mg PO DAILY [facility]


Heparin Sodium (Porcine) (Heparin Sodium 5,000 Units/Ml Vial)  5,000 units 

SUBCUT Q12H [facility]


Sodium Chloride (Normal Saline)  1,000 mls @ 50 mls/hr IV .BOLUS ONE


   Stop: 07/10/21 17:35


   Last Infusion: 07/10/21 01:11 Dose:  75 mls/hr


   Documented by: 


Ceftriaxone Sodium 1 gm/ (Sodium Chloride)  50 mls @ 100 mls/hr IV Q24H [facility]


   Last Admin: 07/10/21 01:18 Dose:  100 mls/hr


   Documented by: 


Sodium Chloride (Sodium Chloride 0.45%)  1,000 mls @ 50 mls/hr IV ASDIRECTED [facility]


Insulin Regular in 0.9 % NACL (Myxredlin In Ns 100 Unit/100 Ml)  100 unit in 100

mls @ 2.5 mls/hr IV CONTINUOUS [facility]; Protocol


Non-Formulary Medication (Brimonidine Tartrate/Timolol [Combigan 0.2%-0.5% Eye 

Drops])  1 drop EYERT BID [facility]


Non-Formulary Medication (Carvedilol [Carvedilol])  6.25 mg PO BIDMEALS [facility]


Non-Formulary Medication (Oxybutynin Chloride [Ditropan Xl])  10 mg PO BEDTIME 

[facility]


Omeprazole (Omeprazole 20 Mg Cap.Cr)  20 mg PO DAILY [facility]


Ondansetron HCl (Ondansetron 4 Mg/2 Ml Sdv)  4 mg IVPUSH Q4H PRN


   PRN Reason: Nausea/Vomiting


Senna/Docusate Sodium (Docusate Sodium/Sennosides 50-8.6 Mg Tab)  2 tab PO BID 

[facility]


   Last Admin: 07/10/21 01:22 Dose:  Not Given


   Documented by: 


Sodium Chloride (Sodium Chloride 0.9% 10 Ml Syringe)  10 ml FLUSH ASDIRECTED PRN


   PRN Reason: Keep Vein Open





Discontinued Medications





Heparin Sodium (Porcine) (Heparin Sodium 5,000 Units/Ml Vial)  5,000 units 

SUBCUT Q12H [facility]


   Last Admin: 07/10/21 02:10 Dose:  Not Given


   Documented by: 


Potassium Chloride 10 meq/ (Premix)  100 mls @ 100 mls/hr IV ONETIME ONE


   Stop: 07/09/21 22:35


   Last Admin: 07/09/21 21:44 Dose:  100 mls/hr


   Documented by: 


Sodium Chloride (Normal Saline)  1,000 mls @ 75 mls/hr IV ASDIRECTED [facility]


Potassium Chloride 20 meq/ (Premix)  100 mls @ 50 mls/hr IV Q2H [facility]


   Stop: 07/10/21 06:59


   Last Admin: 07/10/21 06:06 Dose:  50 mls/hr


   Documented by: 


Insulin Regular in 0.9 % NACL (Myxredlin In Ns 100 Unit/100 Ml)  100 unit in 100

mls @ 2.5 drops/hr IV CONTINUOUS [facility]; Protocol


Ondansetron HCl (Ondansetron 4 Mg/2 Ml Sdv)  4 mg IVPUSH ONETIME ONE


   Stop: 07/09/21 20:27


   Last Admin: 07/09/21 20:40 Dose:  4 mg


   Documented by: 


Pantoprazole Sodium (Pantoprazole 40 Mg Vial)  40 mg IVPUSH ONETIME ONE


   Stop: 07/09/21 20:27


   Last Admin: 07/09/21 20:43 Dose:  40 mg


   Documented by: 











- Exam


General: Alert, Oriented


HEENT: Pupils Equal, Pupils Reactive, EOMI, Mucous Membr. Moist/Pink


Neck: Supple


Lungs: Clear to Auscultation, Normal Respiratory Effort


Cardiovascular: Regular Rate, Regular Rhythm


GI/Abdominal Exam: Normal Bowel Sounds, Soft, Non-Tender, No Organomegaly, No 

Distention, No Abnormal Bruit, No Mass, Pelvis Stable


Back Exam: Normal Inspection, Full Range of Motion


Extremities: Normal Inspection, Normal Range of Motion, Non-Tender, No Pedal 

Edema, Normal Capillary Refill, Other (Patient status post bilateral lower 

extremity amputation)


Peripheral Pulses: 2+: Carotid (L), Carotid (R), Brachial (L), Brachial (R), 

Radial (L), Radial (R), Femoral (L), Femoral (R)


Skin: Warm, Dry, Intact


Wound/Incisions: Healing Well


Neurological: No New Focal Deficit


Psy/Mental Status: Alert, Normal Affect, Normal Mood





- Patient Data


Lab Results Last 24 hrs: 


                         Laboratory Results - last 24 hr











  07/09/21 07/09/21 07/09/21 Range/Units





  19:25 19:25 19:25 


 


WBC  10.9 H    (5.0-10.0)  10^3/uL


 


RBC  3.39 L    (4.2-5.4)  10^6/uL


 


Hgb  10.3 L    (12.0-16.0)  g/dL


 


Hct  32.6 L    (37.0-47.0)  %


 


MCV  96.2  D    ()  fL


 


MCH  30.4    (27.0-34.0)  pg


 


MCHC  31.6 L    (33.0-35.0)  g/dL


 


Plt Count  394    (150-450)  10^3/uL


 


Neut % (Auto)  84.0 H    (42.2-75.2)  %


 


Lymph % (Auto)  11.8 L    (20.5-50.1)  %


 


Mono % (Auto)  3.7    (2-8)  %


 


Eos % (Auto)  0.2 L    (1.0-3.0)  %


 


Baso % (Auto)  0.3    (0.0-1.0)  %


 


PT   10.6   (9.0-12.0)  SEC


 


INR   1.1   (0.9-1.2)  


 


Sodium    145  (136-145)  mmol/L


 


Potassium    2.9 L  (3.5-5.1)  mmol/L


 


Chloride    111 H  ()  mmol/L


 


Carbon Dioxide    16 L  (21-32)  mmol/L


 


Anion Gap    20.9 H  (7-13)  mEq/L


 


BUN    39 H  (7-18)  mg/dL


 


Creatinine    1.94 H  (0.55-1.02)  mg/dL


 


Est Cr Clr Drug Dosing    TNP  


 


Estimated GFR (MDRD)    25  


 


BUN/Creatinine Ratio    20.1  (No establ ref range)  


 


Glucose    133 H  (70-99)  mg/dL


 


Lactic Acid     (0.4-2.0)  mmol/L


 


Calcium    9.1  (8.5-10.1)  mg/dL


 


Magnesium    2.3  (1.8-2.4)  mg/dL


 


Iron     ()  ug/dL


 


TIBC     (250-450)  ug/dL


 


% Saturation     (20.0-50.0)  %


 


Ferritin     (8-252)  mg/mL


 


Total Bilirubin    0.4  (0.2-1.0)  mg/dL


 


AST    17  (15-37)  U/L


 


ALT    12 L  (14-59)  U/L


 


Alkaline Phosphatase    106  ()  U/L


 


Troponin I High Sens     (<=51)  pg/mL


 


Total Protein    6.8  (6.4-8.2)  g/dL


 


Albumin    2.7 L  (3.4-5.0)  g/dL


 


Globulin    4.1  


 


Albumin/Globulin Ratio    0.66  


 


Amylase    21 L  ()  U/L


 


Lipase    69 L  ()  U/L


 


Urine Color     (YELLOW)  


 


Urine Appearance     (CLEAR)  


 


Urine pH     (5.0-9.0)  


 


Ur Specific Gravity     (1.005-1.030)  


 


Urine Protein     (NEGATIVE)  


 


Urine Glucose (UA)     (NEGATIVE)  


 


Urine Ketones     (NEGATIVE)  


 


Urine Occult Blood     (NEGATIVE)  


 


Urine Nitrite     (NEGATIVE)  


 


Urine Bilirubin     (NEGATIVE)  


 


Urine Urobilinogen     (0.2-1.0)  mg/dL


 


Ur Leukocyte Esterase     (NEGATIVE)  


 


Urine RBC     /HPF


 


Urine WBC     (0-5/HPF)  /HPF


 


Ur Epithelial Cells     (NOT SEEN)  /HPF


 


Urine Bacteria     (0-FEW/HPF)  /HPF


 


Ketones     


 


SARS-CoV-2 RNA (BERNADINE)     (NEGATIVE)  














  07/09/21 07/09/21 07/09/21 Range/Units





  19:25 19:25 19:25 


 


WBC     (5.0-10.0)  10^3/uL


 


RBC     (4.2-5.4)  10^6/uL


 


Hgb     (12.0-16.0)  g/dL


 


Hct     (37.0-47.0)  %


 


MCV     ()  fL


 


MCH     (27.0-34.0)  pg


 


MCHC     (33.0-35.0)  g/dL


 


Plt Count     (150-450)  10^3/uL


 


Neut % (Auto)     (42.2-75.2)  %


 


Lymph % (Auto)     (20.5-50.1)  %


 


Mono % (Auto)     (2-8)  %


 


Eos % (Auto)     (1.0-3.0)  %


 


Baso % (Auto)     (0.0-1.0)  %


 


PT     (9.0-12.0)  SEC


 


INR     (0.9-1.2)  


 


Sodium     (136-145)  mmol/L


 


Potassium     (3.5-5.1)  mmol/L


 


Chloride     ()  mmol/L


 


Carbon Dioxide     (21-32)  mmol/L


 


Anion Gap     (7-13)  mEq/L


 


BUN     (7-18)  mg/dL


 


Creatinine     (0.55-1.02)  mg/dL


 


Est Cr Clr Drug Dosing     


 


Estimated GFR (MDRD)     


 


BUN/Creatinine Ratio     (No establ ref range)  


 


Glucose     (70-99)  mg/dL


 


Lactic Acid  0.6    (0.4-2.0)  mmol/L


 


Calcium     (8.5-10.1)  mg/dL


 


Magnesium     (1.8-2.4)  mg/dL


 


Iron    27 L  ()  ug/dL


 


TIBC    172 L  (250-450)  ug/dL


 


% Saturation    15.7 L  (20.0-50.0)  %


 


Ferritin    981 H  (8-252)  mg/mL


 


Total Bilirubin     (0.2-1.0)  mg/dL


 


AST     (15-37)  U/L


 


ALT     (14-59)  U/L


 


Alkaline Phosphatase     ()  U/L


 


Troponin I High Sens   22   (<=51)  pg/mL


 


Total Protein     (6.4-8.2)  g/dL


 


Albumin     (3.4-5.0)  g/dL


 


Globulin     


 


Albumin/Globulin Ratio     


 


Amylase     ()  U/L


 


Lipase     ()  U/L


 


Urine Color     (YELLOW)  


 


Urine Appearance     (CLEAR)  


 


Urine pH     (5.0-9.0)  


 


Ur Specific Gravity     (1.005-1.030)  


 


Urine Protein     (NEGATIVE)  


 


Urine Glucose (UA)     (NEGATIVE)  


 


Urine Ketones     (NEGATIVE)  


 


Urine Occult Blood     (NEGATIVE)  


 


Urine Nitrite     (NEGATIVE)  


 


Urine Bilirubin     (NEGATIVE)  


 


Urine Urobilinogen     (0.2-1.0)  mg/dL


 


Ur Leukocyte Esterase     (NEGATIVE)  


 


Urine RBC     /HPF


 


Urine WBC     (0-5/HPF)  /HPF


 


Ur Epithelial Cells     (NOT SEEN)  /HPF


 


Urine Bacteria     (0-FEW/HPF)  /HPF


 


Ketones     


 


SARS-CoV-2 RNA (BERNADINE)     (NEGATIVE)  














  07/09/21 07/09/21 07/09/21 Range/Units





  19:25 21:41 22:48 


 


WBC     (5.0-10.0)  10^3/uL


 


RBC     (4.2-5.4)  10^6/uL


 


Hgb     (12.0-16.0)  g/dL


 


Hct     (37.0-47.0)  %


 


MCV     ()  fL


 


MCH     (27.0-34.0)  pg


 


MCHC     (33.0-35.0)  g/dL


 


Plt Count     (150-450)  10^3/uL


 


Neut % (Auto)     (42.2-75.2)  %


 


Lymph % (Auto)     (20.5-50.1)  %


 


Mono % (Auto)     (2-8)  %


 


Eos % (Auto)     (1.0-3.0)  %


 


Baso % (Auto)     (0.0-1.0)  %


 


PT     (9.0-12.0)  SEC


 


INR     (0.9-1.2)  


 


Sodium     (136-145)  mmol/L


 


Potassium     (3.5-5.1)  mmol/L


 


Chloride     ()  mmol/L


 


Carbon Dioxide     (21-32)  mmol/L


 


Anion Gap     (7-13)  mEq/L


 


BUN     (7-18)  mg/dL


 


Creatinine     (0.55-1.02)  mg/dL


 


Est Cr Clr Drug Dosing     


 


Estimated GFR (MDRD)     


 


BUN/Creatinine Ratio     (No establ ref range)  


 


Glucose     (70-99)  mg/dL


 


Lactic Acid     (0.4-2.0)  mmol/L


 


Calcium     (8.5-10.1)  mg/dL


 


Magnesium     (1.8-2.4)  mg/dL


 


Iron     ()  ug/dL


 


TIBC     (250-450)  ug/dL


 


% Saturation     (20.0-50.0)  %


 


Ferritin     (8-252)  mg/mL


 


Total Bilirubin     (0.2-1.0)  mg/dL


 


AST     (15-37)  U/L


 


ALT     (14-59)  U/L


 


Alkaline Phosphatase     ()  U/L


 


Troponin I High Sens     (<=51)  pg/mL


 


Total Protein     (6.4-8.2)  g/dL


 


Albumin     (3.4-5.0)  g/dL


 


Globulin     


 


Albumin/Globulin Ratio     


 


Amylase     ()  U/L


 


Lipase     ()  U/L


 


Urine Color   Yellow   (YELLOW)  


 


Urine Appearance   Turbid   (CLEAR)  


 


Urine pH   5.5   (5.0-9.0)  


 


Ur Specific Gravity   >= 1.030   (1.005-1.030)  


 


Urine Protein   100 H   (NEGATIVE)  


 


Urine Glucose (UA)   Negative   (NEGATIVE)  


 


Urine Ketones   Negative   (NEGATIVE)  


 


Urine Occult Blood   Trace-intact H   (NEGATIVE)  


 


Urine Nitrite   Negative   (NEGATIVE)  


 


Urine Bilirubin   Negative   (NEGATIVE)  


 


Urine Urobilinogen   0.2   (0.2-1.0)  mg/dL


 


Ur Leukocyte Esterase   Large H   (NEGATIVE)  


 


Urine RBC   Not seen   /HPF


 


Urine WBC   Packed H   (0-5/HPF)  /HPF


 


Ur Epithelial Cells   Occasional   (NOT SEEN)  /HPF


 


Urine Bacteria   Many H   (0-FEW/HPF)  /HPF


 


Ketones  Small-20 mg/dl    


 


SARS-CoV-2 RNA (BERNADINE)    Negative  (NEGATIVE)  














  07/10/21 07/10/21 Range/Units





  06:20 06:20 


 


WBC  12.4 H   (5.0-10.0)  10^3/uL


 


RBC  3.11 L   (4.2-5.4)  10^6/uL


 


Hgb  9.6 L   (12.0-16.0)  g/dL


 


Hct  29.8 L   (37.0-47.0)  %


 


MCV  95.8   ()  fL


 


MCH  30.9   (27.0-34.0)  pg


 


MCHC  32.2 L   (33.0-35.0)  g/dL


 


Plt Count  346   (150-450)  10^3/uL


 


Neut % (Auto)  78.7 H   (42.2-75.2)  %


 


Lymph % (Auto)  10.4 L   (20.5-50.1)  %


 


Mono % (Auto)  10.3 H   (2-8)  %


 


Eos % (Auto)  0.3 L   (1.0-3.0)  %


 


Baso % (Auto)  0.3   (0.0-1.0)  %


 


PT    (9.0-12.0)  SEC


 


INR    (0.9-1.2)  


 


Sodium   146 H  (136-145)  mmol/L


 


Potassium   3.6  (3.5-5.1)  mmol/L


 


Chloride   114 H  ()  mmol/L


 


Carbon Dioxide   16 L  (21-32)  mmol/L


 


Anion Gap   19.6 H  (7-13)  mEq/L


 


BUN   40 H  (7-18)  mg/dL


 


Creatinine   1.94 H  (0.55-1.02)  mg/dL


 


Est Cr Clr Drug Dosing   20.41  


 


Estimated GFR (MDRD)   25  


 


BUN/Creatinine Ratio   20.6  (No establ ref range)  


 


Glucose   81  (70-99)  mg/dL


 


Lactic Acid    (0.4-2.0)  mmol/L


 


Calcium   8.4 L  (8.5-10.1)  mg/dL


 


Magnesium    (1.8-2.4)  mg/dL


 


Iron    ()  ug/dL


 


TIBC    (250-450)  ug/dL


 


% Saturation    (20.0-50.0)  %


 


Ferritin    (8-252)  mg/mL


 


Total Bilirubin   0.3  (0.2-1.0)  mg/dL


 


AST   15  (15-37)  U/L


 


ALT   12 L  (14-59)  U/L


 


Alkaline Phosphatase   90  ()  U/L


 


Troponin I High Sens    (<=51)  pg/mL


 


Total Protein   5.8 L  (6.4-8.2)  g/dL


 


Albumin   2.1 L  (3.4-5.0)  g/dL


 


Globulin   3.7  


 


Albumin/Globulin Ratio   0.57  


 


Amylase    ()  U/L


 


Lipase    ()  U/L


 


Urine Color    (YELLOW)  


 


Urine Appearance    (CLEAR)  


 


Urine pH    (5.0-9.0)  


 


Ur Specific Gravity    (1.005-1.030)  


 


Urine Protein    (NEGATIVE)  


 


Urine Glucose (UA)    (NEGATIVE)  


 


Urine Ketones    (NEGATIVE)  


 


Urine Occult Blood    (NEGATIVE)  


 


Urine Nitrite    (NEGATIVE)  


 


Urine Bilirubin    (NEGATIVE)  


 


Urine Urobilinogen    (0.2-1.0)  mg/dL


 


Ur Leukocyte Esterase    (NEGATIVE)  


 


Urine RBC    /HPF


 


Urine WBC    (0-5/HPF)  /HPF


 


Ur Epithelial Cells    (NOT SEEN)  /HPF


 


Urine Bacteria    (0-FEW/HPF)  /HPF


 


Ketones    


 


SARS-CoV-2 RNA (BERNADINE)    (NEGATIVE)  











Result Diagrams: 


                                 07/10/21 06:20





                                 07/10/21 06:20


[facility] Results Last 24 hrs: 


                                  Microbiology











 07/09/21 20:15 Stool Occult Blood ([facility]) - Final





 Stool / Feces    NEGATIVE OCCULT BLOOD





    REFERENCE RANGE: NEGATIVE


 


 07/09/21 19:25 Anaerobic Blood Culture - Final





 Blood - Arm, Left 














Sepsis Event Note





- Evaluation


Sepsis Screening Result: No Definite Risk





- Focused Exam


Vital Signs: 


                                   Vital Signs











  Temp Pulse Pulse Resp BP BP Pulse Ox


 


 07/10/21 04:00  97.5 F   61  20   122/58 L  100


 


 07/09/21 23:44  97.0 F   61  20  130/62  123/47 L  100


 


 07/09/21 21:48  95.9 F L  60   24 H  131/56 L   99














- Problem List Review


Problem List Initiated/Reviewed/Updated: Yes





- My Orders


Last 24 Hours: 


My Active Orders





07/10/21 00:43


Up to Chair [RC] ASDIRECTED 


Vital Signs [RC] 00,04,08,12,16,20 


Acetaminophen [TylenoL]   650 mg PO Q4H PRN 


Ondansetron [Zofran]   4 mg IVPUSH Q4H PRN 


Sodium Chloride 0.9% [Saline Flush]   10 ml FLUSH ASDIRECTED PRN 


Peripheral IV Insertion Adult [OM.PC] Routine 


Resuscitation Status Routine 





07/10/21 00:44


Peripheral IV Care [RC] 08,20 





07/10/21 00:46


Nasogastric Tube Management [Gastrointestinal Tube Mgmt] [RC] 08,20 





07/10/21 00:49


B-HYDROXYBUTYRATE [REF] Routine 





07/10/21 01:00


Docusate Sodium/Sennosides [Senna Plus]   2 tab PO BID 


cefTRIAXone [Rocephin] 1 gm   Sodium Chloride 0.9% [Normal Saline] 50 ml IV Q24H







07/10/21 Breakfast


Nothing per Oral Now Diet [DIET] 





07/10/21 07:41


PHOSPHORUS [CHEM] Routine 





07/10/21 07:43


Blood Glucose Check, Bedside [RC] Q1H 


Communication Order [RC] 08,20 





07/10/21 07:45


Insulin Regular in 0.9 % NACL [Myxredlin in  UNIT/100 ML] 100 unit in 100 

ml IV CONTINUOUS 


Sodium Chloride 0.45% 1,000 ml IV ASDIRECTED 





07/10/21 08:00


carvediloL [Carvedilol]   6.25 mg PO BIDMEALS 





07/10/21 09:00


Aspirin [Halfprin]   81 mg PO DAILY 


Brimonidine Tartrate/Timolol [Combigan 0.2%-0.5% Eye Drops]   1 drop EYERT BID 


Clopidogrel [Plavix]   75 mg PO DAILY 


Heparin Sodium   5,000 units SUBCUT Q12H 


Omeprazole   20 mg PO DAILY 





07/10/21 12:00


BASIC METABOLIC PANEL,BMP [CHEM] Routine 





07/10/21 21:00


Oxybutynin Chloride [Ditropan Xl]   10 mg PO BEDTIME 





07/11/21 05:00


BASIC METABOLIC PANEL,BMP [CHEM] Routine 


CBC WITH AUTO DIFF [HEME] Routine 














- Plan


Plan:: 





Surgical History:


Previous documentation of herniorrhaphythe patient is uncertain of this, right 

carpal tunnel surgery, right above-the-knee amputation, previous documentation 

of bladder surgery of which the patient is uncertain, CABG, cardiac stent 

placement, left below-knee amputation, bilateral cataract surgery, cardiac 

ablation











Family History:


Cancer, stroke, diabetes, coronary artery disease, hypertension, hyperlipidemia











Social History:





Tobacco:


Never








Alcohol:


Denies








Caffeine:


Cola








Drugs:


Never








Allergies:


Gemfibrozil, propanolol, codeine, Metformin, penicillin








Code Status:


Full











Assessment / Plan:


Query partial small bowel obstruction.  N.p.o.  Nasogastric tube set to low 

intermittent suction.  IV half-normal saline at 50 mils per hour.  Senna plus: 2

 tabs NG twice daily





Hypernatremia.  Will monitor sodium levels intermittently





Overactive bladder.  Ditropan XL 10 mg p.o. nightly





Chronic kidney disease, baseline creatinine approximately 2.  Will monitor 

creatinine level intermittently





Hypokalemia.  Will monitor potassium levels intermittently and supplement as 

necessary





Urinary tract infection.  Rocephin 1 g IV daily





Glaucoma.  Combigan 0.2% / 0.5%: 1 drop in both eyes twice daily





Cholelithiasis, asymptomatic





Anemia of chronic disease.  Will monitor hemoglobin level intermittently.  





CHF, ejection fraction 30 to 35%.  Coreg 6.25 mg p.o. twice daily





Moderate mitral regurgitation





Pulmonary hypertension





Coronary artery disease, status post MI, status post MI, status post CABG. Coreg

 6.25 mg p.o. twice daily plus aspirin 81 mg p.o. daily plus Plavix 75 mg p.o. 

daily





Diabetes/DKA.  IV normal saline 75 mils per hour plus insulin drip at 2.5 

units/h.  Check fingerstick glucose hourly.  CMP every 4 hours.  Dr. To be 

notified when serum glucose is less than 110





GERD.  Prilosec 20 mg p.o. daily





Hyperlipidemia





Hypertension.  Coreg 6.25 mg p.o. twice daily





Osteoarthritis





Neuropathy





History of nephrolithiasis, status post left nephrostomy tube placement





History of SVT, status post cardiac ablation.  Coreg 6.25 mg p.o. twice daily





Peripheral vascular disease.  Aspirin 81 mg p.o. daily plus Plavix 75 mg p.o. 

daily





Seasonal allergies





Chronic pain





DVT prophylaxis.  Heparin 5000 units subcutaneously every 12 hours











Disposition:


Anticipate discharge within 24 hours











END OF DOCTOR [facility]AM[facility] HISTORY AND PHYSICAL / CONSULTATION NOTE

## 2021-07-10 NOTE — PCM.HP
H&P History of Present Illness





- General


Date of Service: 07/10/21


Admit Problem/Dx: 


                           Admission Diagnosis/Problem





Admission Diagnosis/Problem      Small bowel obstruction











- History of Present Illness


Initial Comments - Free Text/Narative: 





Patient is a 75-year-old female who presents to plaint of abdominal distention 

and vomiting.  She states she started being unwell approximately 12 hours prior 

to hospitalization.  She does admit to onset of diarrhea.  She denies fever, 

rigors, cough, wheeze, melena, hematochezia.  She indicates her last bowel 

movement was on July 10, 2021 while she was in the emergency department.  She 

presents for further evaluation


  ** Abdomen


Pain Score (Numeric/FACES): 5





- Related Data


Allergies/Adverse Reactions: 


                                    Allergies











Allergy/AdvReac Type Severity Reaction Status Date / Time


 


gemfibrozil [From Lopid] Allergy  Cannot Verified 07/09/21 23:59





   Remember  


 


Penicillins Allergy  Rash Verified 07/09/21 23:59


 


propranolol HCl Allergy  Cannot Verified 07/09/21 23:59





[From Inderal LA]   Remember  


 


codeine AdvReac  Irritabilit Verified 07/09/21 23:59





   y  


 


metformin AdvReac  Shaking Verified 07/09/21 23:59











Home Medications: 


                                    Home Meds





Aspirin [Lo-Dose Aspirin EC] 81 mg PO DAILY 02/11/19 [History]


Clopidogrel [Plavix] 75 mg PO DAILY 02/11/19 [History]


Oxybutynin Chloride [Ditropan Xl] 10 mg PO BEDTIME 02/11/19 [History]


Ascorbic Acid 500 mg PO BIDMEALS 03/10/21 [History]


Brimonidine Tartrate/Timolol [Combigan 0.2%-0.5% Eye Drops] 1 drop EYERT BID 

03/10/21 [History]


Lactulose 30 ml PO BID PRN 03/10/21 [History]


Omeprazole 20 mg PO DAILY 03/10/21 [History]


Bumetanide [Bumex] 1 mg PO DAILY #30 tablet 03/19/21 [Rx]


Multivitamin with Minerals [Multiple Vitamin] 1 tab PO WITHBREAKFAST 07/09/21 

[History]


Potassium Chloride 15 ml PO DAILY 07/09/21 [History]


carvediloL [Carvedilol] 6.25 mg PO BIDMEALS 07/09/21 [History]











Past Medical History


HEENT History: Reports: Cataract, Impaired Vision


Other HEENT History: wears glasses


Cardiovascular History: Reports: CAD, High Cholesterol, Hypertension, MI, Stents


Respiratory History: Reports: None


Gastrointestinal History: Reports: Chronic Constipation


Genitourinary History: Reports: UTI, Recurrent


OB/GYN History: Reports: Pregnancy


Musculoskeletal History: Reports: Osteoarthritis


Neurological History: Reports: Neuropathy, Diabetic, Other (See Below)


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


Psychiatric History: Reports: None


Endocrine/Metabolic History: Reports: Diabetes, Type II


Hematologic History: Reports: Anemia, Blood Transfusion(s)


Immunologic History: Reports: None


Oncologic (Cancer) History: Reports: None


Dermatologic History: Reports: None





- Infectious Disease History


Infectious Disease History: Reports: None





- Past Surgical History


Head Surgeries/Procedures: Reports: None


HEENT Surgical History: Reports: Cataract Surgery


Cardiovascular Surgical History: Reports: Coronary Artery Bypass


GI Surgical History: Reports: Hernia Repair/Other


Female  Surgical History: Reports: Other (See Below)


Other Female  Surgeries/Procedures: bladder surgery


Neurological Surgical History: Reports: None


Musculoskeletal Surgical History: Reports: Amputation, Other (See Below)


Other Musculoskeletal Surgeries/Procedures:: carpal tunnel surgery, amputation 

March 2nd left bka and June 10th right aka





Social & Family History





- Family History


Family Medical History: No Pertinent Family History





- Tobacco Use


Tobacco Use Status *Q: Never Tobacco User


Second Hand Smoke Exposure: No





- Caffeine Use


Caffeine Use: Reports: Coffee, Soda, Tea





- Recreational Drug Use


Recreational Drug Use: No





- Living Situation & Occupation


Living situation: Reports: 


Occupation: Retired





H&P Review of Systems





- Review of Systems:


Review Of Systems: See Below


General: Reports: No Symptoms


HEENT: Reports: No Symptoms


Pulmonary: Reports: No Symptoms


Cardiovascular: Reports: No Symptoms


Gastrointestinal: Reports: Abdominal Pain, Diarrhea


Genitourinary: Reports: No Symptoms


Musculoskeletal: Reports: No Symptoms


Skin: Reports: No Symptoms


Psychiatric: Reports: No Symptoms


Neurological: Reports: No Symptoms


Hematologic/Lymphatic: Reports: No Symptoms


Immunologic: Reports: No Symptoms





Exam





- Exam


Exam: See Below





- Vital Signs


Vital Signs: 


                                Last Vital Signs











Temp  97.0 F   07/09/21 23:44


 


Pulse  61   07/09/21 23:44


 


Resp  20   07/09/21 23:44


 


BP  130/62   07/09/21 23:44


 


Pulse Ox  100   07/09/21 23:44











Weight: 129 lb 6.4 oz





- Exam


General: Alert, Oriented, 4


HEENT: PERRLA, Hearing Intact, Mucosa Moist & Pink, Nares Patent, Normal Nasal 

Septum, Posterior Pharynx Clear, Conjunctiva Clear, EOMI, EACs Clear, TMs Clear


Neck: Supple, Trachea Midline, 2


Lungs: Clear to Auscultation, Normal Respiratory Effort


Cardiovascular: Regular Rate, Regular Rhythm


GI/Abdominal Exam: Normal Bowel Sounds, Soft, Non-Tender, No Organomegaly, No 

Distention, No Abnormal Bruit, No Mass, Pelvis Stable


Back Exam: Normal Inspection, Full Range of Motion, NT


Extremities: Other (Status post bilateral extremity amputation)


Peripheral Pulses: 2+: Carotid (L), Carotid (R), Brachial (L), Brachial (R), 

Radial (L), Radial (R), Femoral (L), Femoral (R), Popliteal (L), Popliteal (R), 

Posterior Tibial (L), Posterior Tibial (R), Dorsalis Pedis (L), Dorsalis Pedis 

(R)


Skin: Warm, Dry, Intact


Neurological: Cranial Nerves Intact


Neuro Extensive - Mental Status: Alert, Oriented x3, Normal Mood/Affect, Normal 

Cognition


Neuro Extensive - Motor, Sensory, Reflexes: CN II-XII Intact


DTR: 2+: Bicep (L), Bicep (R), Tricep (L), Tricep (R), Patella (L), Patella (R),

 Achilles (L), Achilles (R)


Psychiatric: Alert, Normal Affect, Normal Mood





- Patient Data


Lab Results Last 24 hrs: 


                         Laboratory Results - last 24 hr











  07/09/21 07/09/21 07/09/21 Range/Units





  19:25 19:25 19:25 


 


WBC  10.9 H    (5.0-10.0)  10^3/uL


 


RBC  3.39 L    (4.2-5.4)  10^6/uL


 


Hgb  10.3 L    (12.0-16.0)  g/dL


 


Hct  32.6 L    (37.0-47.0)  %


 


MCV  96.2  D    ()  fL


 


MCH  30.4    (27.0-34.0)  pg


 


MCHC  31.6 L    (33.0-35.0)  g/dL


 


Plt Count  394    (150-450)  10^3/uL


 


Neut % (Auto)  84.0 H    (42.2-75.2)  %


 


Lymph % (Auto)  11.8 L    (20.5-50.1)  %


 


Mono % (Auto)  3.7    (2-8)  %


 


Eos % (Auto)  0.2 L    (1.0-3.0)  %


 


Baso % (Auto)  0.3    (0.0-1.0)  %


 


PT   10.6   (9.0-12.0)  SEC


 


INR   1.1   (0.9-1.2)  


 


Sodium    145  (136-145)  mmol/L


 


Potassium    2.9 L  (3.5-5.1)  mmol/L


 


Chloride    111 H  ()  mmol/L


 


Carbon Dioxide    16 L  (21-32)  mmol/L


 


Anion Gap    20.9 H  (7-13)  mEq/L


 


BUN    39 H  (7-18)  mg/dL


 


Creatinine    1.94 H  (0.55-1.02)  mg/dL


 


Est Cr Clr Drug Dosing    TNP  


 


Estimated GFR (MDRD)    25  


 


BUN/Creatinine Ratio    20.1  (No establ ref range)  


 


Glucose    133 H  (70-99)  mg/dL


 


Lactic Acid     (0.4-2.0)  mmol/L


 


Calcium    9.1  (8.5-10.1)  mg/dL


 


Magnesium    2.3  (1.8-2.4)  mg/dL


 


Total Bilirubin    0.4  (0.2-1.0)  mg/dL


 


AST    17  (15-37)  U/L


 


ALT    12 L  (14-59)  U/L


 


Alkaline Phosphatase    106  ()  U/L


 


Troponin I High Sens     (<=51)  pg/mL


 


Total Protein    6.8  (6.4-8.2)  g/dL


 


Albumin    2.7 L  (3.4-5.0)  g/dL


 


Globulin    4.1  


 


Albumin/Globulin Ratio    0.66  


 


Amylase    21 L  ()  U/L


 


Lipase    69 L  ()  U/L


 


Urine Color     (YELLOW)  


 


Urine Appearance     (CLEAR)  


 


Urine pH     (5.0-9.0)  


 


Ur Specific Gravity     (1.005-1.030)  


 


Urine Protein     (NEGATIVE)  


 


Urine Glucose (UA)     (NEGATIVE)  


 


Urine Ketones     (NEGATIVE)  


 


Urine Occult Blood     (NEGATIVE)  


 


Urine Nitrite     (NEGATIVE)  


 


Urine Bilirubin     (NEGATIVE)  


 


Urine Urobilinogen     (0.2-1.0)  mg/dL


 


Ur Leukocyte Esterase     (NEGATIVE)  


 


Urine RBC     /HPF


 


Urine WBC     (0-5/HPF)  /HPF


 


Ur Epithelial Cells     (NOT SEEN)  /HPF


 


Urine Bacteria     (0-FEW/HPF)  /HPF


 


SARS-CoV-2 RNA (BERNADINE)     (NEGATIVE)  














  07/09/21 07/09/21 07/09/21 Range/Units





  19:25 19:25 21:41 


 


WBC     (5.0-10.0)  10^3/uL


 


RBC     (4.2-5.4)  10^6/uL


 


Hgb     (12.0-16.0)  g/dL


 


Hct     (37.0-47.0)  %


 


MCV     ()  fL


 


MCH     (27.0-34.0)  pg


 


MCHC     (33.0-35.0)  g/dL


 


Plt Count     (150-450)  10^3/uL


 


Neut % (Auto)     (42.2-75.2)  %


 


Lymph % (Auto)     (20.5-50.1)  %


 


Mono % (Auto)     (2-8)  %


 


Eos % (Auto)     (1.0-3.0)  %


 


Baso % (Auto)     (0.0-1.0)  %


 


PT     (9.0-12.0)  SEC


 


INR     (0.9-1.2)  


 


Sodium     (136-145)  mmol/L


 


Potassium     (3.5-5.1)  mmol/L


 


Chloride     ()  mmol/L


 


Carbon Dioxide     (21-32)  mmol/L


 


Anion Gap     (7-13)  mEq/L


 


BUN     (7-18)  mg/dL


 


Creatinine     (0.55-1.02)  mg/dL


 


Est Cr Clr Drug Dosing     


 


Estimated GFR (MDRD)     


 


BUN/Creatinine Ratio     (No establ ref range)  


 


Glucose     (70-99)  mg/dL


 


Lactic Acid  0.6    (0.4-2.0)  mmol/L


 


Calcium     (8.5-10.1)  mg/dL


 


Magnesium     (1.8-2.4)  mg/dL


 


Total Bilirubin     (0.2-1.0)  mg/dL


 


AST     (15-37)  U/L


 


ALT     (14-59)  U/L


 


Alkaline Phosphatase     ()  U/L


 


Troponin I High Sens   22   (<=51)  pg/mL


 


Total Protein     (6.4-8.2)  g/dL


 


Albumin     (3.4-5.0)  g/dL


 


Globulin     


 


Albumin/Globulin Ratio     


 


Amylase     ()  U/L


 


Lipase     ()  U/L


 


Urine Color    Yellow  (YELLOW)  


 


Urine Appearance    Turbid  (CLEAR)  


 


Urine pH    5.5  (5.0-9.0)  


 


Ur Specific Gravity    >= 1.030  (1.005-1.030)  


 


Urine Protein    100 H  (NEGATIVE)  


 


Urine Glucose (UA)    Negative  (NEGATIVE)  


 


Urine Ketones    Negative  (NEGATIVE)  


 


Urine Occult Blood    Trace-intact H  (NEGATIVE)  


 


Urine Nitrite    Negative  (NEGATIVE)  


 


Urine Bilirubin    Negative  (NEGATIVE)  


 


Urine Urobilinogen    0.2  (0.2-1.0)  mg/dL


 


Ur Leukocyte Esterase    Large H  (NEGATIVE)  


 


Urine RBC    Not seen  /HPF


 


Urine WBC    Packed H  (0-5/HPF)  /HPF


 


Ur Epithelial Cells    Occasional  (NOT SEEN)  /HPF


 


Urine Bacteria    Many H  (0-FEW/HPF)  /HPF


 


SARS-CoV-2 RNA (BERNADINE)     (NEGATIVE)  














  07/09/21 Range/Units





  22:48 


 


WBC   (5.0-10.0)  10^3/uL


 


RBC   (4.2-5.4)  10^6/uL


 


Hgb   (12.0-16.0)  g/dL


 


Hct   (37.0-47.0)  %


 


MCV   ()  fL


 


MCH   (27.0-34.0)  pg


 


MCHC   (33.0-35.0)  g/dL


 


Plt Count   (150-450)  10^3/uL


 


Neut % (Auto)   (42.2-75.2)  %


 


Lymph % (Auto)   (20.5-50.1)  %


 


Mono % (Auto)   (2-8)  %


 


Eos % (Auto)   (1.0-3.0)  %


 


Baso % (Auto)   (0.0-1.0)  %


 


PT   (9.0-12.0)  SEC


 


INR   (0.9-1.2)  


 


Sodium   (136-145)  mmol/L


 


Potassium   (3.5-5.1)  mmol/L


 


Chloride   ()  mmol/L


 


Carbon Dioxide   (21-32)  mmol/L


 


Anion Gap   (7-13)  mEq/L


 


BUN   (7-18)  mg/dL


 


Creatinine   (0.55-1.02)  mg/dL


 


Est Cr Clr Drug Dosing   


 


Estimated GFR (MDRD)   


 


BUN/Creatinine Ratio   (No establ ref range)  


 


Glucose   (70-99)  mg/dL


 


Lactic Acid   (0.4-2.0)  mmol/L


 


Calcium   (8.5-10.1)  mg/dL


 


Magnesium   (1.8-2.4)  mg/dL


 


Total Bilirubin   (0.2-1.0)  mg/dL


 


AST   (15-37)  U/L


 


ALT   (14-59)  U/L


 


Alkaline Phosphatase   ()  U/L


 


Troponin I High Sens   (<=51)  pg/mL


 


Total Protein   (6.4-8.2)  g/dL


 


Albumin   (3.4-5.0)  g/dL


 


Globulin   


 


Albumin/Globulin Ratio   


 


Amylase   ()  U/L


 


Lipase   ()  U/L


 


Urine Color   (YELLOW)  


 


Urine Appearance   (CLEAR)  


 


Urine pH   (5.0-9.0)  


 


Ur Specific Gravity   (1.005-1.030)  


 


Urine Protein   (NEGATIVE)  


 


Urine Glucose (UA)   (NEGATIVE)  


 


Urine Ketones   (NEGATIVE)  


 


Urine Occult Blood   (NEGATIVE)  


 


Urine Nitrite   (NEGATIVE)  


 


Urine Bilirubin   (NEGATIVE)  


 


Urine Urobilinogen   (0.2-1.0)  mg/dL


 


Ur Leukocyte Esterase   (NEGATIVE)  


 


Urine RBC   /HPF


 


Urine WBC   (0-5/HPF)  /HPF


 


Ur Epithelial Cells   (NOT SEEN)  /HPF


 


Urine Bacteria   (0-FEW/HPF)  /HPF


 


SARS-CoV-2 RNA (BERNADINE)  Negative  (NEGATIVE)  











Result Diagrams: 


                                 07/09/21 19:25





                                 07/09/21 19:25


[facility]dulaziz Results Last 24 hrs: 


                                  Microbiology











 07/09/21 20:15 Stool Occult Blood (ABDULAZIZ) - Final





 Stool / Feces    NEGATIVE OCCULT BLOOD





    REFERENCE RANGE: NEGATIVE


 


 07/09/21 19:25 Anaerobic Blood Culture - Final





 Blood - Arm, Left 











Problem List Initiated/Reviewed/Updated: Yes


Orders Last 24hrs: 


                               Active Orders 24 hr











 Category Date Time Status


 


 Admission Diagnosis [ADT] Stat ADT  07/09/21 23:07 Ordered


 


 Admission Status [Patient Status] [ADT] Stat ADT  07/09/21 23:07 Active


 


 Blood Glucose Check, Bedside [RC] Q1H Care  07/10/21 00:43 Ordered


 


 Communication Order [RC] 08,20 Care  07/10/21 00:50 Ordered


 


 Gastrointestinal Tube Mgmt [RC] ASDIRECTED Care  07/09/21 23:04 Active


 


 Nasogastric Tube Management [Gastrointestinal Tube Mgmt Care  07/10/21 00:46 

Ordered





 ] [RC] ASDIRECTED   


 


 Peripheral IV Care [RC] .AS DIRECTED Care  07/10/21 00:44 Ordered


 


 Up to Chair [RC] ASDIRECTED Care  07/10/21 00:43 Ordered


 


 Vital Signs [RC] Q4H Care  07/10/21 00:43 Ordered


 


 Nothing per Oral Now Diet [DIET] Diet  07/10/21 Breakfast Ordered


 


 Abdomen 1V Upright [CR] Stat Exams  07/09/21 23:04 Ordered


 


 Chest 1V Frontal [CR] Stat Exams  07/09/21 23:04 Ordered


 


 B-HYDROXYBUTYRATE [REF] Routine Lab  07/10/21 00:49 Ordered


 


 CBC WITH AUTO DIFF [HEME] Routine Lab  07/10/21 05:00 Ordered


 


 COMPREHENSIVE METABOLIC PN,CMP [CHEM] Routine Lab  07/10/21 05:00 Ordered


 


 CULTURE BLOOD [BC] Stat Lab  07/09/21 19:25 Results


 


 CULTURE URINE [RM] Stat Lab  07/09/21 21:41 Received


 


 FERRITIN [CHEM] Routine Lab  07/10/21 00:48 Ordered


 


 IRON/TIBC [CHEM] Routine Lab  07/10/21 00:48 Ordered


 


 KETONES,BLOOD [CHEM] Routine Lab  07/10/21 00:49 Ordered


 


 Acetaminophen [TylenoL] Med  07/10/21 00:43 Ordered





 650 mg PO Q4H PRN   


 


 Docusate Sodium/Sennosides [Senna Plus] Med  07/10/21 01:00 Ordered





 2 tab PO BID   


 


 Heparin Sodium Med  07/10/21 00:45 Ordered





 5,000 units SUBCUT Q12H   


 


 Insulin Regular in 0.9 % NACL [Myxredlin in  UNIT Med  07/10/21 01:00 

Ordered





 /100 ML]   





 100 unit in 100 ml IV CONTINUOUS   


 


 Ondansetron [Zofran] Med  07/10/21 00:43 Ordered





 4 mg IVPUSH Q4H PRN   


 


 Potassium Chloride [KCL in Water 20 MEQ/100 ML] 20 meq Med  07/10/21 01:00 

Ordered





 Premix Bag 1 bag   





 IV Q2H   


 


 Sodium Chloride 0.9% [Normal Saline] 1,000 ml Med  07/09/21 21:36 Active





 IV .BOLUS   


 


 Sodium Chloride 0.9% [Normal Saline] 1,000 ml Med  07/10/21 00:45 Ordered





 IV ASDIRECTED   


 


 Sodium Chloride 0.9% [Saline Flush] Med  07/10/21 00:43 Ordered





 10 ml FLUSH ASDIRECTED PRN   


 


 cefTRIAXone [Rocephin] 1 gm Med  07/10/21 01:00 Ordered





 Sodium Chloride 0.9% [Normal Saline] 50 ml   





 IV Q24H   


 


 NG [Nasogastric Orogastric Tube Insertion] [OM.PC] Oth  07/09/21 23:04 Ordered





 Routine   


 


 Peripheral IV Insertion Adult [OM.PC] Routine Oth  07/10/21 00:43 Ordered


 


 Resuscitation Status Routine Resus Stat  07/10/21 00:43 Ordered








                                Medication Orders





Acetaminophen (Acetaminophen 325 Mg Tab)  650 mg PO Q4H PRN


   PRN Reason: Pain (Mild 1-3)/fever


Heparin Sodium (Porcine) (Heparin Sodium 5,000 Units/Ml Vial)  5,000 units 

SUBCUT Q12H LULA


Sodium Chloride (Normal Saline)  1,000 mls @ 50 mls/hr IV .BOLUS ONE


   Stop: 07/10/21 17:35


   Last Admin: 07/09/21 21:43  Dose: 50 mls/hr


   Documented by: [id number]


Sodium Chloride (Normal Saline)  1,000 mls @ 75 mls/hr IV ASDIRECTED LULA


Potassium Chloride 20 meq/ (Premix)  100 mls @ 50 mls/hr IV Q2H LULA


   Stop: 07/10/21 06:59


Ceftriaxone Sodium 1 gm/ (Sodium Chloride)  50 mls @ 100 mls/hr IV Q24H LULA


Insulin Regular in 0.9 % NACL (Myxredlin In Ns 100 Unit/100 Ml)  100 unit in 100

 mls @ 2.5 drops/hr IV CONTINUOUS LULA; Protocol


Ondansetron HCl (Ondansetron 4 Mg/2 Ml Sdv)  4 mg IVPUSH Q4H PRN


   PRN Reason: Nausea/Vomiting


Senna/Docusate Sodium (Docusate Sodium/Sennosides 50-8.6 Mg Tab)  2 tab PO BID 

LULA


Sodium Chloride (Sodium Chloride 0.9% 10 Ml Syringe)  10 ml FLUSH ASDIRECTED PRN


   PRN Reason: Keep Vein Open








Assessment/Plan Comment:: 





Surgical History:


Previous documentation of herniorrhaphythe patient is uncertain of this, right 

carpal tunnel surgery, right above-the-knee amputation, previous documentation 

of bladder surgery of which the patient is uncertain, CABG, cardiac stent 

placement, left below-knee amputation, bilateral cataract surgery, cardiac 

ablation











Family History:


Cancer, stroke, diabetes, coronary artery disease, hypertension, hyperlipidemia











Social History:





Tobacco:


Never








Alcohol:


Denies








Caffeine:


Cola








Drugs:


Never








Allergies:


Gemfibrozil, propanolol, codeine, Metformin, penicillin








Code Status:


Full











Assessment / Plan:


Query partial small bowel obstruction.  N.p.o.  Nasogastric tube set to low 

intermittent suction.  IV normal saline 75 mL's per hour.  Senna plus: 2 tabs NG

 twice daily





Overactive bladder





Chronic kidney disease, baseline creatinine approximately 2.  Will monitor c

reatinine level intermittently





Hypokalemia.  Will monitor potassium levels intermittently and supplement as 

necessary





Urinary tract infection.  Rocephin 1 g IV daily





Glaucoma





Cholelithiasis, asymptomatic





Anemia.  Will monitor hemoglobin level intermittently.  Fecal occult blood 

negative.  Check serum ferritin, iron panel





CHF, ejection fraction 30 to 35%





Moderate mitral regurgitation





Pulmonary hypertension





Coronary artery disease, status post MI, status post MI, status post CABG





Diabetes/DKA.  IV normal saline 75 mils per hour plus insulin drip at 2.5 

units/h.  Check fingerstick glucose hourly.  CMP every 4 hours.  DrStacey To be 

notified when serum glucose is less than 110





GERD





Hyperlipidemia





Hypertension





Osteoarthritis





Neuropathy





History of nephrolithiasis, status post left nephrostomy tube placement





History of SVT, status post cardiac ablation





Peripheral vascular disease





Seasonal allergies





Chronic pain





DVT prophylaxis.  Heparin 5000 units subcutaneously every 12 hours











Disposition:


Anticipate discharge within 40 hours.  At the time of admission, the patient's 

home medications were pending input into the EMR/DHR system.  Once their input, 

they will be reviewed and reconciled











END OF DOCTOR EMAMIS HISTORY AND PHYSICAL / CONSULTATION NOTE

## 2021-07-11 VITALS — HEART RATE: 64 BPM | DIASTOLIC BLOOD PRESSURE: 64 MMHG | SYSTOLIC BLOOD PRESSURE: 125 MMHG

## 2021-07-11 LAB
ANION GAP SERPL CALC-SCNC: 19.5 MEQ/L (ref 7–13)
CHLORIDE SERPL-SCNC: 117 MMOL/L (ref 98–107)
SODIUM SERPL-SCNC: 145 MMOL/L (ref 136–145)

## 2021-07-11 RX ADMIN — OMEPRAZOLE SCH MG: 20 CAPSULE, DELAYED RELEASE ORAL at 06:10

## 2021-07-11 RX ADMIN — HEPARIN SODIUM SCH UNITS: 5000 INJECTION, SOLUTION INTRAVENOUS; SUBCUTANEOUS at 08:31

## 2021-07-11 RX ADMIN — Medication SCH DROP: at 08:36

## 2021-07-11 NOTE — PCM.PN
- General Info


Date of Service: 07/11/21


Subjective Update: 





The patient complains of throat pain due to her nasogastric tube.  Aside from 

this she endorses no complaints.  She denies fever, rigors, nausea, vomiting, 

cough, wheeze, abdominal pain, chest pain, dyspnea.  I explained to the patient 

her current medical condition and plan of care and I have answered all of her 

questions.  Per nursing staff patient has been having regular bowel movements 

and her nasogastric tube output has decreased


Functional Status: Reports: Pain Controlled





- Review of Systems


General: Reports: No Symptoms


HEENT: Reports: No Symptoms


Pulmonary: Reports: No Symptoms


Cardiovascular: Reports: No Symptoms


Gastrointestinal: Reports: No Symptoms


Genitourinary: Reports: No Symptoms


Musculoskeletal: Reports: No Symptoms


Skin: Reports: No Symptoms


Neurological: Reports: No Symptoms


Psychiatric: Reports: No Symptoms





- Patient Data


Vitals - Most Recent: 


                                Last Vital Signs











Temp  98.7 F   07/11/21 04:00


 


Pulse  64   07/11/21 04:00


 


Resp  16   07/11/21 04:00


 


BP  135/54 L  07/11/21 04:00


 


Pulse Ox  99   07/11/21 04:00











Weight - Most Recent: 129 lb 6.4 oz


I&O - Last 24 Hours: 


                                 Intake & Output











 07/10/21 07/11/21 07/11/21





 22:59 06:59 14:59


 


Intake Total 1350 44 


 


Output Total 620 75 


 


Balance 730 -31 











Lab Results Last 24 Hours: 


                         Laboratory Results - last 24 hr











  07/10/21 07/10/21 07/10/21 Range/Units





  06:20 07:51 08:45 


 


WBC     (5.0-10.0)  10^3/uL


 


RBC     (4.2-5.4)  10^6/uL


 


Hgb     (12.0-16.0)  g/dL


 


Hct     (37.0-47.0)  %


 


MCV     ()  fL


 


MCH     (27.0-34.0)  pg


 


MCHC     (33.0-35.0)  g/dL


 


Plt Count     (150-450)  10^3/uL


 


Neut % (Auto)     (42.2-75.2)  %


 


Lymph % (Auto)     (20.5-50.1)  %


 


Mono % (Auto)     (2-8)  %


 


Eos % (Auto)     (1.0-3.0)  %


 


Baso % (Auto)     (0.0-1.0)  %


 


Sodium     (136-145)  mmol/L


 


Potassium     (3.5-5.1)  mmol/L


 


Chloride     ()  mmol/L


 


Carbon Dioxide     (21-32)  mmol/L


 


Anion Gap     (7-13)  mEq/L


 


BUN     (7-18)  mg/dL


 


Creatinine     (0.55-1.02)  mg/dL


 


Est Cr Clr Drug Dosing     mL/min


 


Estimated GFR (MDRD)     


 


Glucose     (70-99)  mg/dL


 


POC Glucose   65 L  72  (70-99)  mg/dL


 


Calcium     (8.5-10.1)  mg/dL


 


Phosphorus  5.2 H    (2.6-4.7)  mg/dL














  07/10/21 07/10/21 07/10/21 Range/Units





  09:59 11:01 12:02 


 


WBC     (5.0-10.0)  10^3/uL


 


RBC     (4.2-5.4)  10^6/uL


 


Hgb     (12.0-16.0)  g/dL


 


Hct     (37.0-47.0)  %


 


MCV     ()  fL


 


MCH     (27.0-34.0)  pg


 


MCHC     (33.0-35.0)  g/dL


 


Plt Count     (150-450)  10^3/uL


 


Neut % (Auto)     (42.2-75.2)  %


 


Lymph % (Auto)     (20.5-50.1)  %


 


Mono % (Auto)     (2-8)  %


 


Eos % (Auto)     (1.0-3.0)  %


 


Baso % (Auto)     (0.0-1.0)  %


 


Sodium     (136-145)  mmol/L


 


Potassium     (3.5-5.1)  mmol/L


 


Chloride     ()  mmol/L


 


Carbon Dioxide     (21-32)  mmol/L


 


Anion Gap     (7-13)  mEq/L


 


BUN     (7-18)  mg/dL


 


Creatinine     (0.55-1.02)  mg/dL


 


Est Cr Clr Drug Dosing     mL/min


 


Estimated GFR (MDRD)     


 


Glucose     (70-99)  mg/dL


 


POC Glucose  268 H  218 H  232 H  (70-99)  mg/dL


 


Calcium     (8.5-10.1)  mg/dL


 


Phosphorus     (2.6-4.7)  mg/dL














  07/10/21 07/10/21 07/10/21 Range/Units





  12:04 13:12 13:59 


 


WBC     (5.0-10.0)  10^3/uL


 


RBC     (4.2-5.4)  10^6/uL


 


Hgb     (12.0-16.0)  g/dL


 


Hct     (37.0-47.0)  %


 


MCV     ()  fL


 


MCH     (27.0-34.0)  pg


 


MCHC     (33.0-35.0)  g/dL


 


Plt Count     (150-450)  10^3/uL


 


Neut % (Auto)     (42.2-75.2)  %


 


Lymph % (Auto)     (20.5-50.1)  %


 


Mono % (Auto)     (2-8)  %


 


Eos % (Auto)     (1.0-3.0)  %


 


Baso % (Auto)     (0.0-1.0)  %


 


Sodium  145    (136-145)  mmol/L


 


Potassium  3.1 L    (3.5-5.1)  mmol/L


 


Chloride  113 H    ()  mmol/L


 


Carbon Dioxide  15 L    (21-32)  mmol/L


 


Anion Gap  20.1 H    (7-13)  mEq/L


 


BUN  40 H    (7-18)  mg/dL


 


Creatinine  2.02 H    (0.55-1.02)  mg/dL


 


Est Cr Clr Drug Dosing  19.60    mL/min


 


Estimated GFR (MDRD)  24    


 


Glucose  234 H    (70-99)  mg/dL


 


POC Glucose   189 H  174 H  (70-99)  mg/dL


 


Calcium  8.1 L    (8.5-10.1)  mg/dL


 


Phosphorus     (2.6-4.7)  mg/dL














  07/10/21 07/10/21 07/10/21 Range/Units





  15:09 16:17 16:21 


 


WBC     (5.0-10.0)  10^3/uL


 


RBC     (4.2-5.4)  10^6/uL


 


Hgb     (12.0-16.0)  g/dL


 


Hct     (37.0-47.0)  %


 


MCV     ()  fL


 


MCH     (27.0-34.0)  pg


 


MCHC     (33.0-35.0)  g/dL


 


Plt Count     (150-450)  10^3/uL


 


Neut % (Auto)     (42.2-75.2)  %


 


Lymph % (Auto)     (20.5-50.1)  %


 


Mono % (Auto)     (2-8)  %


 


Eos % (Auto)     (1.0-3.0)  %


 


Baso % (Auto)     (0.0-1.0)  %


 


Sodium   145   (136-145)  mmol/L


 


Potassium   3.3 L   (3.5-5.1)  mmol/L


 


Chloride   114 H   ()  mmol/L


 


Carbon Dioxide   16 L   (21-32)  mmol/L


 


Anion Gap   18.3 H   (7-13)  mEq/L


 


BUN   40 H   (7-18)  mg/dL


 


Creatinine   2.01 H   (0.55-1.02)  mg/dL


 


Est Cr Clr Drug Dosing   19.70   mL/min


 


Estimated GFR (MDRD)   24   


 


Glucose   152 H   (70-99)  mg/dL


 


POC Glucose  162 H   131 H  (70-99)  mg/dL


 


Calcium   8.2 L   (8.5-10.1)  mg/dL


 


Phosphorus     (2.6-4.7)  mg/dL














  07/10/21 07/10/21 07/10/21 Range/Units





  17:42 18:16 19:21 


 


WBC     (5.0-10.0)  10^3/uL


 


RBC     (4.2-5.4)  10^6/uL


 


Hgb     (12.0-16.0)  g/dL


 


Hct     (37.0-47.0)  %


 


MCV     ()  fL


 


MCH     (27.0-34.0)  pg


 


MCHC     (33.0-35.0)  g/dL


 


Plt Count     (150-450)  10^3/uL


 


Neut % (Auto)     (42.2-75.2)  %


 


Lymph % (Auto)     (20.5-50.1)  %


 


Mono % (Auto)     (2-8)  %


 


Eos % (Auto)     (1.0-3.0)  %


 


Baso % (Auto)     (0.0-1.0)  %


 


Sodium     (136-145)  mmol/L


 


Potassium     (3.5-5.1)  mmol/L


 


Chloride     ()  mmol/L


 


Carbon Dioxide     (21-32)  mmol/L


 


Anion Gap     (7-13)  mEq/L


 


BUN     (7-18)  mg/dL


 


Creatinine     (0.55-1.02)  mg/dL


 


Est Cr Clr Drug Dosing     mL/min


 


Estimated GFR (MDRD)     


 


Glucose     (70-99)  mg/dL


 


POC Glucose  108 H  112 H  95  (70-99)  mg/dL


 


Calcium     (8.5-10.1)  mg/dL


 


Phosphorus     (2.6-4.7)  mg/dL














  07/10/21 07/10/21 07/10/21 Range/Units





  20:13 21:15 21:25 


 


WBC     (5.0-10.0)  10^3/uL


 


RBC     (4.2-5.4)  10^6/uL


 


Hgb     (12.0-16.0)  g/dL


 


Hct     (37.0-47.0)  %


 


MCV     ()  fL


 


MCH     (27.0-34.0)  pg


 


MCHC     (33.0-35.0)  g/dL


 


Plt Count     (150-450)  10^3/uL


 


Neut % (Auto)     (42.2-75.2)  %


 


Lymph % (Auto)     (20.5-50.1)  %


 


Mono % (Auto)     (2-8)  %


 


Eos % (Auto)     (1.0-3.0)  %


 


Baso % (Auto)     (0.0-1.0)  %


 


Sodium    144  (136-145)  mmol/L


 


Potassium    5.8 H D  (3.5-5.1)  mmol/L


 


Chloride    115 H  ()  mmol/L


 


Carbon Dioxide    16 L  (21-32)  mmol/L


 


Anion Gap    18.8 H  (7-13)  mEq/L


 


BUN    40 H  (7-18)  mg/dL


 


Creatinine    2.04 H  (0.55-1.02)  mg/dL


 


Est Cr Clr Drug Dosing    19.41  mL/min


 


Estimated GFR (MDRD)    24  


 


Glucose    79  (70-99)  mg/dL


 


POC Glucose  88  69 L   (70-99)  mg/dL


 


Calcium    8.3 L  (8.5-10.1)  mg/dL


 


Phosphorus     (2.6-4.7)  mg/dL














  07/10/21 07/10/21 07/11/21 Range/Units





  21:51 23:37 04:06 


 


WBC     (5.0-10.0)  10^3/uL


 


RBC     (4.2-5.4)  10^6/uL


 


Hgb     (12.0-16.0)  g/dL


 


Hct     (37.0-47.0)  %


 


MCV     ()  fL


 


MCH     (27.0-34.0)  pg


 


MCHC     (33.0-35.0)  g/dL


 


Plt Count     (150-450)  10^3/uL


 


Neut % (Auto)     (42.2-75.2)  %


 


Lymph % (Auto)     (20.5-50.1)  %


 


Mono % (Auto)     (2-8)  %


 


Eos % (Auto)     (1.0-3.0)  %


 


Baso % (Auto)     (0.0-1.0)  %


 


Sodium     (136-145)  mmol/L


 


Potassium     (3.5-5.1)  mmol/L


 


Chloride     ()  mmol/L


 


Carbon Dioxide     (21-32)  mmol/L


 


Anion Gap     (7-13)  mEq/L


 


BUN     (7-18)  mg/dL


 


Creatinine     (0.55-1.02)  mg/dL


 


Est Cr Clr Drug Dosing     mL/min


 


Estimated GFR (MDRD)     


 


Glucose     (70-99)  mg/dL


 


POC Glucose  159 H  127 H  82  (70-99)  mg/dL


 


Calcium     (8.5-10.1)  mg/dL


 


Phosphorus     (2.6-4.7)  mg/dL














  07/11/21 07/11/21 07/11/21 Range/Units





  05:35 05:35 06:08 


 


WBC  10.8 H    (5.0-10.0)  10^3/uL


 


RBC  3.10 L    (4.2-5.4)  10^6/uL


 


Hgb  9.5 L    (12.0-16.0)  g/dL


 


Hct  30.2 L    (37.0-47.0)  %


 


MCV  97.4    ()  fL


 


MCH  30.6    (27.0-34.0)  pg


 


MCHC  31.5 L    (33.0-35.0)  g/dL


 


Plt Count  376    (150-450)  10^3/uL


 


Neut % (Auto)  69.4    (42.2-75.2)  %


 


Lymph % (Auto)  15.4 L    (20.5-50.1)  %


 


Mono % (Auto)  10.0 H    (2-8)  %


 


Eos % (Auto)  4.8 H    (1.0-3.0)  %


 


Baso % (Auto)  0.4    (0.0-1.0)  %


 


Sodium   145   (136-145)  mmol/L


 


Potassium   5.5 H   (3.5-5.1)  mmol/L


 


Chloride   117 H   ()  mmol/L


 


Carbon Dioxide   14 L   (21-32)  mmol/L


 


Anion Gap   19.5 H   (7-13)  mEq/L


 


BUN   36 H   (7-18)  mg/dL


 


Creatinine   1.94 H   (0.55-1.02)  mg/dL


 


Est Cr Clr Drug Dosing   20.41   mL/min


 


Estimated GFR (MDRD)   25   


 


Glucose   101 H   (70-99)  mg/dL


 


POC Glucose    93  (70-99)  mg/dL


 


Calcium   8.2 L   (8.5-10.1)  mg/dL


 


Phosphorus     (2.6-4.7)  mg/dL











[facility] Results Last 24 Hours: 


                                  Microbiology











 07/09/21 19:25 Aerobic Blood Culture - Preliminary





 Blood - Arm, Left    NO GROWTH AFTER 1 DAY





 Anaerobic Blood Culture - Final











Med Orders - Current: 


                               Current Medications





Acetaminophen (Acetaminophen 325 Mg Tab)  650 mg PO Q4H PRN


   PRN Reason: Pain (Mild 1-3)/fever


Aspirin (Aspirin 81 Mg Tab.Ec)  81 mg PO DAILY [facility]


   Last Admin: 07/10/21 08:19 Dose:  81 mg


   Documented by: 


Carvedilol (Carvedilol 6.25 Mg Tab)  6.25 mg PO BIDMEALS [facility]


   Last Admin: 07/10/21 17:33 Dose:  Not Given


   Documented by: 


Clopidogrel Bisulfate (Clopidogrel 75 Mg Tab)  75 mg PO DAILY [facility]


   Last Admin: 07/10/21 08:21 Dose:  75 mg


   Documented by: 


Dextrose/Water (50% Dextrose In Water 50 Ml Syringe)  50 ml IVPUSH Q15M PRN


   PRN Reason: Hypoglycemia


Glucagon (Glucagon,Human Recombinant 1 Mg Vial)  1 mg IM Q15M PRN


   PRN Reason: Hypoglycemia


Heparin Sodium (Porcine) (Heparin Sodium 5,000 Units/Ml Vial)  5,000 units 

SUBCUT Q12H [facility]


   Last Admin: 07/10/21 20:43 Dose:  5,000 units


   Documented by: 


Ceftriaxone Sodium 1 gm/ (Sodium Chloride)  50 mls @ 100 mls/hr IV Q24H [facility]


   Last Admin: 07/11/21 01:18 Dose:  100 mls/hr


   Documented by: 


Insulin Human Lispro (Insulin Lispro 100 Units/Ml 3 Ml Vial)  0 unit SUBCUT Q6H 

[facility]; Protocol


   Last Admin: 07/11/21 04:43 Dose:  Not Given


   Documented by: 


Brimonidine Tartrate /Timolol (Combigan) 0.2%-0.5% Eye Drops **Own Med**  1 drop

EYELF BID [facility]


   Last Admin: 07/10/21 20:44 Dose:  1 drop


   Documented by: 


Omeprazole (Omeprazole 20 Mg Cap.Cr)  20 mg PO ACBREAKFAST [facility]


   Last Admin: 07/11/21 06:10 Dose:  20 mg


   Documented by: 


Ondansetron HCl (Ondansetron 4 Mg/2 Ml Sdv)  4 mg IVPUSH Q4H PRN


   PRN Reason: Nausea/Vomiting


Oxybutynin Chloride (Oxybutynin 5 Mg Tab.Er)  10 mg PO BEDTIME [facility]


   Last Admin: 07/10/21 20:42 Dose:  10 mg


   Documented by: 


Senna/Docusate Sodium (Docusate Sodium/Sennosides 50-8.6 Mg Tab)  2 tab PO BID 

[facility]


   Last Admin: 07/10/21 20:42 Dose:  2 tab


   Documented by: 


Sodium Bicarbonate (Sodium Bicarbonate 650 Mg Tab)  650 mg PO TID [facility]


Sodium Chloride (Sodium Chloride 0.9% 10 Ml Syringe)  10 ml FLUSH ASDIRECTED PRN


   PRN Reason: Keep Vein Open





Discontinued Medications





Dextrose/Water (50% Dextrose In Water 50 Ml Syringe)  100 ml IVPUSH ONETIME ONE


   Stop: 07/10/21 08:30


   Last Admin: 07/10/21 09:22 Dose:  100 ml


   Documented by: 


Dextrose/Water (50% Dextrose In Water 50 Ml Syringe)  50 ml IVPUSH ONETIME ONE


   Stop: 07/10/21 21:24


   Last Admin: 07/10/21 21:30 Dose:  50 ml


   Documented by: 


Dextrose/Water (50% Dextrose In Water 50 Ml Syringe) Confirm Administered Dose 

50 ml .ROUTE .STK-MED ONE


   Stop: 07/10/21 21:23


   Last Admin: 07/10/21 21:37 Dose:  Not Given


   Documented by: 


Heparin Sodium (Porcine) (Heparin Sodium 5,000 Units/Ml Vial)  5,000 units 

SUBCUT Q12H [facility]


   Last Admin: 07/10/21 02:10 Dose:  Not Given


   Documented by: 


Potassium Chloride 10 meq/ (Premix)  100 mls @ 100 mls/hr IV ONETIME ONE


   Stop: 07/09/21 22:35


   Last Admin: 07/09/21 21:44 Dose:  100 mls/hr


   Documented by: 


Sodium Chloride (Normal Saline)  1,000 mls @ 50 mls/hr IV .BOLUS ONE


   Stop: 07/10/21 17:35


   Last Infusion: 07/10/21 08:09 Dose:  75 mls/hr


   Documented by: 


Sodium Chloride (Normal Saline)  1,000 mls @ 75 mls/hr IV ASDIRECTED LULA


Potassium Chloride 20 meq/ (Premix)  100 mls @ 50 mls/hr IV Q2H [facility]


   Stop: 07/10/21 06:59


   Last Admin: 07/10/21 06:06 Dose:  50 mls/hr


   Documented by: 


Insulin Regular in 0.9 % NACL (Myxredlin In Ns 100 Unit/100 Ml)  100 unit in 100

mls @ 2.5 drops/hr IV CONTINUOUS LULA; Protocol


Sodium Chloride (Sodium Chloride 0.45%)  1,000 mls @ 50 mls/hr IV ASDIRECTED LULA


Insulin Regular in 0.9 % NACL (Myxredlin In Ns 100 Unit/100 Ml)  100 unit in 100

mls @ 2.5 mls/hr IV CONTINUOUS LULA; Protocol


   Last Titration: 07/10/21 21:24 Dose:  0 mls/hr, 0 mls/hr


   Documented by: 


Dextrose/Sodium Chloride (Dextrose 5%-1/2 Ns)  1,000 mls @ 75 mls/hr IV 

ASDIRECTED LULA


   Last Admin: 07/10/21 20:41 Dose:  75 mls/hr


   Documented by: 


Potassium Chloride 20 meq/ (Premix)  100 mls @ 50 mls/hr IV Q2H [facility]


   Stop: 07/10/21 20:59


   Last Admin: 07/10/21 20:02 Dose:  50 mls/hr


   Documented by: 


Sodium Chloride (Normal Saline)  1,000 mls @ 75 mls/hr IV ASDIRECTED LULA


   Last Admin: 07/10/21 22:26 Dose:  75 mls/hr


   Documented by: 


Ondansetron HCl (Ondansetron 4 Mg/2 Ml Sdv)  4 mg IVPUSH ONETIME ONE


   Stop: 07/09/21 20:27


   Last Admin: 07/09/21 20:40 Dose:  4 mg


   Documented by: 


Pantoprazole Sodium (Pantoprazole 40 Mg Vial)  40 mg IVPUSH ONETIME ONE


   Stop: 07/09/21 20:27


   Last Admin: 07/09/21 20:43 Dose:  40 mg


   Documented by: 


Potassium Chloride (Potassium Chloride 10 Meq Tab.Er)  40 meq PO Q1H [facility]


   Stop: 07/10/21 15:01


   Last Admin: 07/10/21 18:19 Dose:  Not Given


   Documented by: 


Potassium Chloride (Potassium Chloride 10 Meq Tab.Er)  40 meq PO Q1H [facility]


   Stop: 07/10/21 18:01


   Last Admin: 07/10/21 18:19 Dose:  40 meq


   Documented by: 


Sodium Bicarbonate (Sodium Bicarbonate 650 Mg Tab)  650 mg PO BID [facility]


   Last Admin: 07/10/21 20:42 Dose:  650 mg


   Documented by: 


Sodium Polystyrene Sulfonate (Sodium Polystyrene Sulfonate 15 Gm/60 Ml Susp 60 

Ml Bot)  30 gm PO ONETIME ONE


   Stop: 07/11/21 07:20











- Exam


General: Alert, Oriented


HEENT: Pupils Equal, Pupils Reactive, EOMI, Mucous Membr. Moist/Pink


Neck: Supple


Lungs: Clear to Auscultation, Normal Respiratory Effort


Cardiovascular: Regular Rate, Regular Rhythm


GI/Abdominal Exam: Normal Bowel Sounds, Soft, Non-Tender, No Organomegaly, No 

Distention, No Abnormal Bruit, No Mass, Pelvis Stable


Back Exam: Normal Inspection, Full Range of Motion


Extremities: Other (Patient status post bilateral lower extremity amputation)


Peripheral Pulses: 2+: Carotid (L), Carotid (R), Brachial (L), Brachial (R), 

Radial (L), Radial (R), Femoral (L), Femoral (R)


Skin: Warm, Dry, Intact


Wound/Incisions: Healing Well


Neurological: No New Focal Deficit


Psy/Mental Status: Alert, Normal Affect, Normal Mood





- Patient Data


Lab Results Last 24 hrs: 


                         Laboratory Results - last 24 hr











  07/10/21 07/10/21 07/10/21 Range/Units





  06:20 07:51 08:45 


 


WBC     (5.0-10.0)  10^3/uL


 


RBC     (4.2-5.4)  10^6/uL


 


Hgb     (12.0-16.0)  g/dL


 


Hct     (37.0-47.0)  %


 


MCV     ()  fL


 


MCH     (27.0-34.0)  pg


 


MCHC     (33.0-35.0)  g/dL


 


Plt Count     (150-450)  10^3/uL


 


Neut % (Auto)     (42.2-75.2)  %


 


Lymph % (Auto)     (20.5-50.1)  %


 


Mono % (Auto)     (2-8)  %


 


Eos % (Auto)     (1.0-3.0)  %


 


Baso % (Auto)     (0.0-1.0)  %


 


Sodium     (136-145)  mmol/L


 


Potassium     (3.5-5.1)  mmol/L


 


Chloride     ()  mmol/L


 


Carbon Dioxide     (21-32)  mmol/L


 


Anion Gap     (7-13)  mEq/L


 


BUN     (7-18)  mg/dL


 


Creatinine     (0.55-1.02)  mg/dL


 


Est Cr Clr Drug Dosing     mL/min


 


Estimated GFR (MDRD)     


 


Glucose     (70-99)  mg/dL


 


POC Glucose   65 L  72  (70-99)  mg/dL


 


Calcium     (8.5-10.1)  mg/dL


 


Phosphorus  5.2 H    (2.6-4.7)  mg/dL














  07/10/21 07/10/21 07/10/21 Range/Units





  09:59 11:01 12:02 


 


WBC     (5.0-10.0)  10^3/uL


 


RBC     (4.2-5.4)  10^6/uL


 


Hgb     (12.0-16.0)  g/dL


 


Hct     (37.0-47.0)  %


 


MCV     ()  fL


 


MCH     (27.0-34.0)  pg


 


MCHC     (33.0-35.0)  g/dL


 


Plt Count     (150-450)  10^3/uL


 


Neut % (Auto)     (42.2-75.2)  %


 


Lymph % (Auto)     (20.5-50.1)  %


 


Mono % (Auto)     (2-8)  %


 


Eos % (Auto)     (1.0-3.0)  %


 


Baso % (Auto)     (0.0-1.0)  %


 


Sodium     (136-145)  mmol/L


 


Potassium     (3.5-5.1)  mmol/L


 


Chloride     ()  mmol/L


 


Carbon Dioxide     (21-32)  mmol/L


 


Anion Gap     (7-13)  mEq/L


 


BUN     (7-18)  mg/dL


 


Creatinine     (0.55-1.02)  mg/dL


 


Est Cr Clr Drug Dosing     mL/min


 


Estimated GFR (MDRD)     


 


Glucose     (70-99)  mg/dL


 


POC Glucose  268 H  218 H  232 H  (70-99)  mg/dL


 


Calcium     (8.5-10.1)  mg/dL


 


Phosphorus     (2.6-4.7)  mg/dL














  07/10/21 07/10/21 07/10/21 Range/Units





  12:04 13:12 13:59 


 


WBC     (5.0-10.0)  10^3/uL


 


RBC     (4.2-5.4)  10^6/uL


 


Hgb     (12.0-16.0)  g/dL


 


Hct     (37.0-47.0)  %


 


MCV     ()  fL


 


MCH     (27.0-34.0)  pg


 


MCHC     (33.0-35.0)  g/dL


 


Plt Count     (150-450)  10^3/uL


 


Neut % (Auto)     (42.2-75.2)  %


 


Lymph % (Auto)     (20.5-50.1)  %


 


Mono % (Auto)     (2-8)  %


 


Eos % (Auto)     (1.0-3.0)  %


 


Baso % (Auto)     (0.0-1.0)  %


 


Sodium  145    (136-145)  mmol/L


 


Potassium  3.1 L    (3.5-5.1)  mmol/L


 


Chloride  113 H    ()  mmol/L


 


Carbon Dioxide  15 L    (21-32)  mmol/L


 


Anion Gap  20.1 H    (7-13)  mEq/L


 


BUN  40 H    (7-18)  mg/dL


 


Creatinine  2.02 H    (0.55-1.02)  mg/dL


 


Est Cr Clr Drug Dosing  19.60    mL/min


 


Estimated GFR (MDRD)  24    


 


Glucose  234 H    (70-99)  mg/dL


 


POC Glucose   189 H  174 H  (70-99)  mg/dL


 


Calcium  8.1 L    (8.5-10.1)  mg/dL


 


Phosphorus     (2.6-4.7)  mg/dL














  07/10/21 07/10/21 07/10/21 Range/Units





  15:09 16:17 16:21 


 


WBC     (5.0-10.0)  10^3/uL


 


RBC     (4.2-5.4)  10^6/uL


 


Hgb     (12.0-16.0)  g/dL


 


Hct     (37.0-47.0)  %


 


MCV     ()  fL


 


MCH     (27.0-34.0)  pg


 


MCHC     (33.0-35.0)  g/dL


 


Plt Count     (150-450)  10^3/uL


 


Neut % (Auto)     (42.2-75.2)  %


 


Lymph % (Auto)     (20.5-50.1)  %


 


Mono % (Auto)     (2-8)  %


 


Eos % (Auto)     (1.0-3.0)  %


 


Baso % (Auto)     (0.0-1.0)  %


 


Sodium   145   (136-145)  mmol/L


 


Potassium   3.3 L   (3.5-5.1)  mmol/L


 


Chloride   114 H   ()  mmol/L


 


Carbon Dioxide   16 L   (21-32)  mmol/L


 


Anion Gap   18.3 H   (7-13)  mEq/L


 


BUN   40 H   (7-18)  mg/dL


 


Creatinine   2.01 H   (0.55-1.02)  mg/dL


 


Est Cr Clr Drug Dosing   19.70   mL/min


 


Estimated GFR (MDRD)   24   


 


Glucose   152 H   (70-99)  mg/dL


 


POC Glucose  162 H   131 H  (70-99)  mg/dL


 


Calcium   8.2 L   (8.5-10.1)  mg/dL


 


Phosphorus     (2.6-4.7)  mg/dL














  07/10/21 07/10/21 07/10/21 Range/Units





  17:42 18:16 19:21 


 


WBC     (5.0-10.0)  10^3/uL


 


RBC     (4.2-5.4)  10^6/uL


 


Hgb     (12.0-16.0)  g/dL


 


Hct     (37.0-47.0)  %


 


MCV     ()  fL


 


MCH     (27.0-34.0)  pg


 


MCHC     (33.0-35.0)  g/dL


 


Plt Count     (150-450)  10^3/uL


 


Neut % (Auto)     (42.2-75.2)  %


 


Lymph % (Auto)     (20.5-50.1)  %


 


Mono % (Auto)     (2-8)  %


 


Eos % (Auto)     (1.0-3.0)  %


 


Baso % (Auto)     (0.0-1.0)  %


 


Sodium     (136-145)  mmol/L


 


Potassium     (3.5-5.1)  mmol/L


 


Chloride     ()  mmol/L


 


Carbon Dioxide     (21-32)  mmol/L


 


Anion Gap     (7-13)  mEq/L


 


BUN     (7-18)  mg/dL


 


Creatinine     (0.55-1.02)  mg/dL


 


Est Cr Clr Drug Dosing     mL/min


 


Estimated GFR (MDRD)     


 


Glucose     (70-99)  mg/dL


 


POC Glucose  108 H  112 H  95  (70-99)  mg/dL


 


Calcium     (8.5-10.1)  mg/dL


 


Phosphorus     (2.6-4.7)  mg/dL














  07/10/21 07/10/21 07/10/21 Range/Units





  20:13 21:15 21:25 


 


WBC     (5.0-10.0)  10^3/uL


 


RBC     (4.2-5.4)  10^6/uL


 


Hgb     (12.0-16.0)  g/dL


 


Hct     (37.0-47.0)  %


 


MCV     ()  fL


 


MCH     (27.0-34.0)  pg


 


MCHC     (33.0-35.0)  g/dL


 


Plt Count     (150-450)  10^3/uL


 


Neut % (Auto)     (42.2-75.2)  %


 


Lymph % (Auto)     (20.5-50.1)  %


 


Mono % (Auto)     (2-8)  %


 


Eos % (Auto)     (1.0-3.0)  %


 


Baso % (Auto)     (0.0-1.0)  %


 


Sodium    144  (136-145)  mmol/L


 


Potassium    5.8 H D  (3.5-5.1)  mmol/L


 


Chloride    115 H  ()  mmol/L


 


Carbon Dioxide    16 L  (21-32)  mmol/L


 


Anion Gap    18.8 H  (7-13)  mEq/L


 


BUN    40 H  (7-18)  mg/dL


 


Creatinine    2.04 H  (0.55-1.02)  mg/dL


 


Est Cr Clr Drug Dosing    19.41  mL/min


 


Estimated GFR (MDRD)    24  


 


Glucose    79  (70-99)  mg/dL


 


POC Glucose  88  69 L   (70-99)  mg/dL


 


Calcium    8.3 L  (8.5-10.1)  mg/dL


 


Phosphorus     (2.6-4.7)  mg/dL














  07/10/21 07/10/21 07/11/21 Range/Units





  21:51 23:37 04:06 


 


WBC     (5.0-10.0)  10^3/uL


 


RBC     (4.2-5.4)  10^6/uL


 


Hgb     (12.0-16.0)  g/dL


 


Hct     (37.0-47.0)  %


 


MCV     ()  fL


 


MCH     (27.0-34.0)  pg


 


MCHC     (33.0-35.0)  g/dL


 


Plt Count     (150-450)  10^3/uL


 


Neut % (Auto)     (42.2-75.2)  %


 


Lymph % (Auto)     (20.5-50.1)  %


 


Mono % (Auto)     (2-8)  %


 


Eos % (Auto)     (1.0-3.0)  %


 


Baso % (Auto)     (0.0-1.0)  %


 


Sodium     (136-145)  mmol/L


 


Potassium     (3.5-5.1)  mmol/L


 


Chloride     ()  mmol/L


 


Carbon Dioxide     (21-32)  mmol/L


 


Anion Gap     (7-13)  mEq/L


 


BUN     (7-18)  mg/dL


 


Creatinine     (0.55-1.02)  mg/dL


 


Est Cr Clr Drug Dosing     mL/min


 


Estimated GFR (MDRD)     


 


Glucose     (70-99)  mg/dL


 


POC Glucose  159 H  127 H  82  (70-99)  mg/dL


 


Calcium     (8.5-10.1)  mg/dL


 


Phosphorus     (2.6-4.7)  mg/dL














  07/11/21 07/11/21 07/11/21 Range/Units





  05:35 05:35 06:08 


 


WBC  10.8 H    (5.0-10.0)  10^3/uL


 


RBC  3.10 L    (4.2-5.4)  10^6/uL


 


Hgb  9.5 L    (12.0-16.0)  g/dL


 


Hct  30.2 L    (37.0-47.0)  %


 


MCV  97.4    ()  fL


 


MCH  30.6    (27.0-34.0)  pg


 


MCHC  31.5 L    (33.0-35.0)  g/dL


 


Plt Count  376    (150-450)  10^3/uL


 


Neut % (Auto)  69.4    (42.2-75.2)  %


 


Lymph % (Auto)  15.4 L    (20.5-50.1)  %


 


Mono % (Auto)  10.0 H    (2-8)  %


 


Eos % (Auto)  4.8 H    (1.0-3.0)  %


 


Baso % (Auto)  0.4    (0.0-1.0)  %


 


Sodium   145   (136-145)  mmol/L


 


Potassium   5.5 H   (3.5-5.1)  mmol/L


 


Chloride   117 H   ()  mmol/L


 


Carbon Dioxide   14 L   (21-32)  mmol/L


 


Anion Gap   19.5 H   (7-13)  mEq/L


 


BUN   36 H   (7-18)  mg/dL


 


Creatinine   1.94 H   (0.55-1.02)  mg/dL


 


Est Cr Clr Drug Dosing   20.41   mL/min


 


Estimated GFR (MDRD)   25   


 


Glucose   101 H   (70-99)  mg/dL


 


POC Glucose    93  (70-99)  mg/dL


 


Calcium   8.2 L   (8.5-10.1)  mg/dL


 


Phosphorus     (2.6-4.7)  mg/dL











Result Diagrams: 


                                 07/11/21 05:35





                                 07/11/21 05:35


Abdulaziz Results Last 24 hrs: 


                                  Microbiology











 07/09/21 19:25 Aerobic Blood Culture - Preliminary





 Blood - Arm, Left    NO GROWTH AFTER 1 DAY





 Anaerobic Blood Culture - Final














Sepsis Event Note





- Evaluation


Sepsis Screening Result: No Definite Risk





- Focused Exam


Vital Signs: 


                                   Vital Signs











  Temp Pulse Resp BP BP Pulse Ox


 


 07/11/21 04:00  98.7 F  64  16  135/54 L   99


 


 07/11/21 00:00  98.8 F  64  16  112/52 L   99


 


 07/10/21 20:00  97.4 F  64  20   96/48 L  99














- Problem List Review


Problem List Initiated/Reviewed/Updated: Yes





- My Orders


Last 24 Hours: 


My Active Orders





07/10/21 07:43


Communication Order [RC] 08,20 





07/10/21 08:00


Omeprazole   20 mg PO ACBREAKFAST 


carvediloL [Coreg]   6.25 mg PO BIDMEALS 





07/10/21 08:34


Wound Care [RC] DAILY 





07/10/21 08:35


Communication Order [RC] DAILY 





07/10/21 09:00


Aspirin [Halfprin]   81 mg PO DAILY 


Clopidogrel [Plavix]   75 mg PO DAILY 


Heparin Sodium   5,000 units SUBCUT Q12H 





07/10/21 15:13


Communication Order [RC] DAILY 





07/10/21 21:00


Brimonidine Tartrate/Timolol [Combigan 0.2%-0.5% Eye Drops]   1 drop EYELF BID 


Oxybutynin [Oxybutynin  ER]   10 mg PO BEDTIME 





07/10/21 22:00


Dextrose 50% in Water   50 ml IVPUSH Q15M PRN 


Glucagon,Human Recombinant [GlucaGen]   1 mg IM Q15M PRN 


Insulin Lispro [HumaLOG]   See Protocol  SUBCUT Q6H 





07/11/21 Breakfast


Consistent Carbohydrate Diet [DIET] 





07/11/21 07:19


Nasogastric Orogastric Tube Removal [OM.PC] Routine 





07/11/21 07:21


Blood Glucose Check, Bedside [RC] WITHMEALSANDBED 





07/11/21 09:00


Sodium Bicarbonate   650 mg PO TID 





07/12/21 05:00


BASIC METABOLIC PANEL,BMP [CHEM] Routine 


PHOSPHORUS [CHEM] Routine 














- Plan


Plan:: 





Surgical History:


Previous documentation of herniorrhaphythe patient is uncertain of this, right 

carpal tunnel surgery, right above-the-knee amputation, previous documentation 

of bladder surgery of which the patient is uncertain, CABG, cardiac stent 

placement, left below-knee amputation, bilateral cataract surgery, cardiac 

ablation











Family History:


Cancer, stroke, diabetes, coronary artery disease, hypertension, hyperlipidemia











Social History:





Tobacco:


Never








Alcohol:


Denies








Caffeine:


Cola








Drugs:


Never








Allergies:


Gemfibrozil, propanolol, codeine, Metformin, penicillin








Code Status:


Full











Assessment / Plan:


Query partial small bowel obstruction.  Clinically resolved.  Nasogastric tube 

removed July 11, 2021 and diet advanced.  Senna plus: 2 tabs NG twice daily





Hypernatremia.  Will monitor sodium levels intermittently





Overactive bladder.  Ditropan XL 10 mg p.o. nightly





Chronic kidney disease, baseline creatinine approximately 2.  Will monitor 

creatinine level intermittently





Metabolic acidosis, secondary to chronic kidney disease.  Sodium bicarbonate 650

 mg p.o. 3 times daily





Hypokalemia.  Will monitor potassium levels intermittently and supplement as 

necessary





Hyperkalemia.  Will monitor potassium levels intermittently and correct as 

necessary





Hyperphosphatemia.  Will monitor phosphorus levels intermittently and correct as

 necessary





Urinary tract infection.  Rocephin 1 g IV daily





Glaucoma.  Combigan 0.2% / 0.5%: 1 drop in both eyes twice daily





Cholelithiasis, asymptomatic





Anemia of chronic disease.  Will monitor hemoglobin level intermittently.  





CHF, ejection fraction 30 to 35%.  Coreg 6.25 mg p.o. twice daily





Moderate mitral regurgitation





Pulmonary hypertension





Coronary artery disease, status post MI, status post MI, status post CABG. Coreg

 6.25 mg p.o. twice daily plus aspirin 81 mg p.o. daily plus Plavix 75 mg p.o. 

daily





Diabetes.  Will check fingerstick glucose before every meal and at bedtime and 

provide sulci scale





GERD.  Prilosec 20 mg p.o. daily





Hyperlipidemia





Hypertension.  Coreg 6.25 mg p.o. twice daily





Osteoarthritis





Neuropathy





History of nephrolithiasis, status post left nephrostomy tube placement





History of SVT, status post cardiac ablation.  Coreg 6.25 mg p.o. twice daily





Peripheral vascular disease.  Aspirin 81 mg p.o. daily plus Plavix 75 mg p.o. 

daily





Seasonal allergies





Chronic pain





DVT prophylaxis.  Heparin 5000 units subcutaneously every 12 hours











Disposition:


We will advance the patient's diet on this day of July 11 1221.  If she is able 

to tolerate her diet, she will be a candidate for discharge











END OF DOCTOR EMAM[facility] HISTORY AND PHYSICAL / CONSULTATION NOTE

## 2021-07-11 NOTE — [id number]
**Discharge Summary





- Hospital Course


Free Text/Narrative:: 





START OF DOCTOR EMAM[facility] DISCHARGE SUMMARY














Date of Admission:


2021








Date of Discharge:


10:40 AM on 2021











Primary Diagnosis:


Query partial small bowel junction, resolved








Secondary Diagnosis:


Hyponatremia, resolved





Overactive bladder





Chronic kidney disease, baseline creatinine approximately 2





Hypokalemia, status post treatment





Hyperkalemia, status post treatment





Hyperphosphatemia





Urinary tract infection





Glaucoma





Cholelithiasis, asymptomatic





Anemia of chronic disease





CHF, ejection fraction 30 to 35%





Moderate mitral regurgitation





Pulmonary hypertension





Coronary artery disease, status post MI, status post stent, status post CABG





Diabetes





GERD





Hyperlipidemia





Hypertension





Osteoarthritis





Neuropathy





History nephrolithiasis, status post left nephrostomy tube placement





History of SVT, status post cardiac ablation





Peripheral vascular disease





Seasonal allergies





Chronic pain





Metabolic acidosis, likely sequelae of chronic kidney disease











Consultations:


None











Condition on Discharge:


Fair











Disposition:


The patient will be advised to follow-up with the surgeon who performed her left

lower extremity amputation as directed for wound care





The patient is advised follow-up with nephrology 2 weeks post discharge for 

diagnosis of chronic kidney disease, metabolic acidosis





The patient is advised to follow-up with urology as directed for management of 

her left nephrostomy tube





The patient is advised follow-up with her primary care physician or with a 

provider 7 to 10 days post discharge for posthospitalization evaluation











Discharge Medications:


Multivitamin 1 tab p.o. daily





Lactulose 30 mL p.o. twice daily as needed constipation





Vitamin C 500 mg p.o. twice daily





Sodium bicarbonate 650 mg p.o. 3 times daily





Oxybutynin ER 10 mg p.o. nightly





Prilosec 20 mg p.o. daily





Plavix 75 mg p.o. daily





Coreg 6.25 mg p.o. twice daily





Combigan 0.2% / 0.5%: 1 drop in both eyes twice daily





Aspirin 81 mg p.o. daily





Bactrim 400/80 m tab p.o. twice daily.  Quantity 10.  0 refills





Bumex 1 mg p.o. daily





Potassium chloride 20 Marciano use p.o. daily to be resumed on 2021





Senna plus: 2 tabs p.o. twice daily











END OF DOCTOR [facility]AM[facility] DISCHARGE SUMMARY





- Discharge Data


Discharge Date: 21


Discharge Disposition: Home, Self-Care 01


Condition: Fair





- Referral to Home Health


Primary Care Physician: 


PCP None








- Patient Instructions


Diet: Heart Healthy Diet, Low Sodium, Fluid Restriction, Diabetic Diet, Renal 

Diet


Activity: As Tolerated





- Discharge Plan


*PRESCRIPTION DRUG MONITORING PROGRAM REVIEWED*: No


*COPY OF PRESCRIPTION DRUG MONITORING REPORT IN PATIENT GUNJAN: No


Prescriptions/Med Rec: 


Sulfamethoxazole/Trimethoprim [Bactrim 400-80 MG] 1 each PO BID 5 Days #10 

tablet


Docusate Sodium/Sennosides [Senna Plus] 2 tab PO BID 30 Days #120 tablet


Sodium Bicarbonate 650 mg PO TID 30 Days #90 tablet


Home Medications: 


                                    Home Meds





Aspirin [Lo-Dose Aspirin EC] 81 mg PO DAILY 19 [History]


Clopidogrel [Plavix] 75 mg PO DAILY 19 [History]


Ascorbic Acid 500 mg PO BIDMEALS 03/10/21 [History]


Brimonidine Tartrate/Timolol [Combigan 0.2%-0.5% Eye Drops] 1 drop EYELF BID 0

3/10/21 [History]


Lactulose 30 ml PO BID PRN 03/10/21 [History]


Omeprazole 20 mg PO DAILY 03/10/21 [History]


Bumetanide [Bumex] 1 mg PO DAILY #30 tablet 21 [Rx]


Multivitamin with Minerals [Multiple Vitamin] 1 tab PO WITHBREAKFAST 21 

[History]


Potassium Chloride 15 ml PO DAILY 21 [History]


carvediloL [Carvedilol] 6.25 mg PO BIDMEALS 21 [History]


Docusate Sodium/Sennosides [Senna Plus] 2 tab PO BID 30 Days #120 tablet 

21 [Rx]


Oxybutynin [Oxybutynin  ER] 10 mg PO BEDTIME  tab.er 21 [Rx]


Sodium Bicarbonate 650 mg PO TID 30 Days #90 tablet 21 [Rx]


Sulfamethoxazole/Trimethoprim [Bactrim 400-80 MG] 1 each PO BID 5 Days #10 

tablet 21 [Rx]








Patient Handouts:  Docusate Sodium; Senna tablets or capsules, Sodium 

Bicarbonate tablets, Bowel Obstruction, Easy-to-Read, Sulfamethoxazole; 

Trimethoprim, SMX-TMP tablets


Referrals: 


PCP,None [Primary Care Provider] - 





- Discharge Summary/Plan Comment


DC Time >30 min.: Yes





- Review of Systems


General: Reports: No Symptoms


HEENT: Reports: No Symptoms


Pulmonary: Reports: No Symptoms


Cardiovascular: Reports: No Symptoms


Gastrointestinal: Reports: No Symptoms


Genitourinary: Reports: No Symptoms


Musculoskeletal: Reports: No Symptoms


Skin: Reports: No Symptoms


Neurological: Reports: No Symptoms


Psychiatric: Reports: No Symptoms





- Patient Data


Vitals - Most Recent: 


                                Last Vital Signs











Temp  97.9 F   21 08:00


 


Pulse  62   21 08:31


 


Resp  20   21 08:00


 


BP  178/70 H  21 08:31


 


Pulse Ox  100   21 08:00











Weight - Most Recent: 129 lb 6.4 oz


I&O - Last 24 hours: 


                                 Intake & Output











 07/10/21 07/11/21 07/11/21





 22:59 06:59 14:59


 


Intake Total 1350 44 360


 


Output Total 620 75 


 


Balance 730 -31 360











Lab Results - Last 24 hrs: 


                         Laboratory Results - last 24 hr











  07/10/21 07/10/21 07/10/21 Range/Units





  11:01 12:02 12:04 


 


WBC     (5.0-10.0)  10^3/uL


 


RBC     (4.2-5.4)  10^6/uL


 


Hgb     (12.0-16.0)  g/dL


 


Hct     (37.0-47.0)  %


 


MCV     ()  fL


 


MCH     (27.0-34.0)  pg


 


MCHC     (33.0-35.0)  g/dL


 


Plt Count     (150-450)  10^3/uL


 


Neut % (Auto)     (42.2-75.2)  %


 


Lymph % (Auto)     (20.5-50.1)  %


 


Mono % (Auto)     (2-8)  %


 


Eos % (Auto)     (1.0-3.0)  %


 


Baso % (Auto)     (0.0-1.0)  %


 


Sodium    145  (136-145)  mmol/L


 


Potassium    3.1 L  (3.5-5.1)  mmol/L


 


Chloride    113 H  ()  mmol/L


 


Carbon Dioxide    15 L  (21-32)  mmol/L


 


Anion Gap    20.1 H  (7-13)  mEq/L


 


BUN    40 H  (7-18)  mg/dL


 


Creatinine    2.02 H  (0.55-1.02)  mg/dL


 


Est Cr Clr Drug Dosing    19.60  mL/min


 


Estimated GFR (MDRD)    24  


 


Glucose    234 H  (70-99)  mg/dL


 


POC Glucose  218 H  232 H   (70-99)  mg/dL


 


Calcium    8.1 L  (8.5-10.1)  mg/dL














  07/10/21 07/10/21 07/10/21 Range/Units





  13:12 13:59 15:09 


 


WBC     (5.0-10.0)  10^3/uL


 


RBC     (4.2-5.4)  10^6/uL


 


Hgb     (12.0-16.0)  g/dL


 


Hct     (37.0-47.0)  %


 


MCV     ()  fL


 


MCH     (27.0-34.0)  pg


 


MCHC     (33.0-35.0)  g/dL


 


Plt Count     (150-450)  10^3/uL


 


Neut % (Auto)     (42.2-75.2)  %


 


Lymph % (Auto)     (20.5-50.1)  %


 


Mono % (Auto)     (2-8)  %


 


Eos % (Auto)     (1.0-3.0)  %


 


Baso % (Auto)     (0.0-1.0)  %


 


Sodium     (136-145)  mmol/L


 


Potassium     (3.5-5.1)  mmol/L


 


Chloride     ()  mmol/L


 


Carbon Dioxide     (21-32)  mmol/L


 


Anion Gap     (7-13)  mEq/L


 


BUN     (7-18)  mg/dL


 


Creatinine     (0.55-1.02)  mg/dL


 


Est Cr Clr Drug Dosing     mL/min


 


Estimated GFR (MDRD)     


 


Glucose     (70-99)  mg/dL


 


POC Glucose  189 H  174 H  162 H  (70-99)  mg/dL


 


Calcium     (8.5-10.1)  mg/dL














  07/10/21 07/10/21 07/10/21 Range/Units





  16:17 16:21 17:42 


 


WBC     (5.0-10.0)  10^3/uL


 


RBC     (4.2-5.4)  10^6/uL


 


Hgb     (12.0-16.0)  g/dL


 


Hct     (37.0-47.0)  %


 


MCV     ()  fL


 


MCH     (27.0-34.0)  pg


 


MCHC     (33.0-35.0)  g/dL


 


Plt Count     (150-450)  10^3/uL


 


Neut % (Auto)     (42.2-75.2)  %


 


Lymph % (Auto)     (20.5-50.1)  %


 


Mono % (Auto)     (2-8)  %


 


Eos % (Auto)     (1.0-3.0)  %


 


Baso % (Auto)     (0.0-1.0)  %


 


Sodium  145    (136-145)  mmol/L


 


Potassium  3.3 L    (3.5-5.1)  mmol/L


 


Chloride  114 H    ()  mmol/L


 


Carbon Dioxide  16 L    (21-32)  mmol/L


 


Anion Gap  18.3 H    (7-13)  mEq/L


 


BUN  40 H    (7-18)  mg/dL


 


Creatinine  2.01 H    (0.55-1.02)  mg/dL


 


Est Cr Clr Drug Dosing  19.70    mL/min


 


Estimated GFR (MDRD)  24    


 


Glucose  152 H    (70-99)  mg/dL


 


POC Glucose   131 H  108 H  (70-99)  mg/dL


 


Calcium  8.2 L    (8.5-10.1)  mg/dL














  07/10/21 07/10/21 07/10/21 Range/Units





  18:16 19:21 20:13 


 


WBC     (5.0-10.0)  10^3/uL


 


RBC     (4.2-5.4)  10^6/uL


 


Hgb     (12.0-16.0)  g/dL


 


Hct     (37.0-47.0)  %


 


MCV     ()  fL


 


MCH     (27.0-34.0)  pg


 


MCHC     (33.0-35.0)  g/dL


 


Plt Count     (150-450)  10^3/uL


 


Neut % (Auto)     (42.2-75.2)  %


 


Lymph % (Auto)     (20.5-50.1)  %


 


Mono % (Auto)     (2-8)  %


 


Eos % (Auto)     (1.0-3.0)  %


 


Baso % (Auto)     (0.0-1.0)  %


 


Sodium     (136-145)  mmol/L


 


Potassium     (3.5-5.1)  mmol/L


 


Chloride     ()  mmol/L


 


Carbon Dioxide     (21-32)  mmol/L


 


Anion Gap     (7-13)  mEq/L


 


BUN     (7-18)  mg/dL


 


Creatinine     (0.55-1.02)  mg/dL


 


Est Cr Clr Drug Dosing     mL/min


 


Estimated GFR (MDRD)     


 


Glucose     (70-99)  mg/dL


 


POC Glucose  112 H  95  88  (70-99)  mg/dL


 


Calcium     (8.5-10.1)  mg/dL














  07/10/21 07/10/21 07/10/21 Range/Units





  21:15 21:25 21:51 


 


WBC     (5.0-10.0)  10^3/uL


 


RBC     (4.2-5.4)  10^6/uL


 


Hgb     (12.0-16.0)  g/dL


 


Hct     (37.0-47.0)  %


 


MCV     ()  fL


 


MCH     (27.0-34.0)  pg


 


MCHC     (33.0-35.0)  g/dL


 


Plt Count     (150-450)  10^3/uL


 


Neut % (Auto)     (42.2-75.2)  %


 


Lymph % (Auto)     (20.5-50.1)  %


 


Mono % (Auto)     (2-8)  %


 


Eos % (Auto)     (1.0-3.0)  %


 


Baso % (Auto)     (0.0-1.0)  %


 


Sodium   144   (136-145)  mmol/L


 


Potassium   5.8 H D   (3.5-5.1)  mmol/L


 


Chloride   115 H   ()  mmol/L


 


Carbon Dioxide   16 L   (21-32)  mmol/L


 


Anion Gap   18.8 H   (7-13)  mEq/L


 


BUN   40 H   (7-18)  mg/dL


 


Creatinine   2.04 H   (0.55-1.02)  mg/dL


 


Est Cr Clr Drug Dosing   19.41   mL/min


 


Estimated GFR (MDRD)   24   


 


Glucose   79   (70-99)  mg/dL


 


POC Glucose  69 L   159 H  (70-99)  mg/dL


 


Calcium   8.3 L   (8.5-10.1)  mg/dL














  07/10/21 07/11/21 07/11/21 Range/Units





  23:37 04:06 05:35 


 


WBC    10.8 H  (5.0-10.0)  10^3/uL


 


RBC    3.10 L  (4.2-5.4)  10^6/uL


 


Hgb    9.5 L  (12.0-16.0)  g/dL


 


Hct    30.2 L  (37.0-47.0)  %


 


MCV    97.4  ()  fL


 


MCH    30.6  (27.0-34.0)  pg


 


MCHC    31.5 L  (33.0-35.0)  g/dL


 


Plt Count    376  (150-450)  10^3/uL


 


Neut % (Auto)    69.4  (42.2-75.2)  %


 


Lymph % (Auto)    15.4 L  (20.5-50.1)  %


 


Mono % (Auto)    10.0 H  (2-8)  %


 


Eos % (Auto)    4.8 H  (1.0-3.0)  %


 


Baso % (Auto)    0.4  (0.0-1.0)  %


 


Sodium     (136-145)  mmol/L


 


Potassium     (3.5-5.1)  mmol/L


 


Chloride     ()  mmol/L


 


Carbon Dioxide     (21-32)  mmol/L


 


Anion Gap     (7-13)  mEq/L


 


BUN     (7-18)  mg/dL


 


Creatinine     (0.55-1.02)  mg/dL


 


Est Cr Clr Drug Dosing     mL/min


 


Estimated GFR (MDRD)     


 


Glucose     (70-99)  mg/dL


 


POC Glucose  127 H  82   (70-99)  mg/dL


 


Calcium     (8.5-10.1)  mg/dL














  21 Range/Units





  05:35 06:08 10:01 


 


WBC     (5.0-10.0)  10^3/uL


 


RBC     (4.2-5.4)  10^6/uL


 


Hgb     (12.0-16.0)  g/dL


 


Hct     (37.0-47.0)  %


 


MCV     ()  fL


 


MCH     (27.0-34.0)  pg


 


MCHC     (33.0-35.0)  g/dL


 


Plt Count     (150-450)  10^3/uL


 


Neut % (Auto)     (42.2-75.2)  %


 


Lymph % (Auto)     (20.5-50.1)  %


 


Mono % (Auto)     (2-8)  %


 


Eos % (Auto)     (1.0-3.0)  %


 


Baso % (Auto)     (0.0-1.0)  %


 


Sodium  145    (136-145)  mmol/L


 


Potassium  5.5 H    (3.5-5.1)  mmol/L


 


Chloride  117 H    ()  mmol/L


 


Carbon Dioxide  14 L    (21-32)  mmol/L


 


Anion Gap  19.5 H    (7-13)  mEq/L


 


BUN  36 H    (7-18)  mg/dL


 


Creatinine  1.94 H    (0.55-1.02)  mg/dL


 


Est Cr Clr Drug Dosing  20.41    mL/min


 


Estimated GFR (MDRD)  25    


 


Glucose  101 H    (70-99)  mg/dL


 


POC Glucose   93  142 H  (70-99)  mg/dL


 


Calcium  8.2 L    (8.5-10.1)  mg/dL











SUZETTE Results - Last 24 hrs: 


                                  Microbiology











 21 21:41 Urine Culture - Preliminary





 Urine, Nephrostomy    Gram Negative Rods





     Yeast


 


 21 19:25 Aerobic Blood Culture - Preliminary





 Blood - Arm, Left    NO GROWTH AFTER 1 DAY





 Anaerobic Blood Culture - Final











Med Orders - Current: 


                               Current Medications





Acetaminophen (Acetaminophen 325 Mg Tab)  650 mg PO Q4H PRN


   PRN Reason: Pain (Mild 1-3)/fever


Aspirin (Aspirin 81 Mg Tab.Ec)  81 mg PO DAILY [facility]


   Last Admin: 21 08:30 Dose:  81 mg


   Documented by: 


Carvedilol (Carvedilol 6.25 Mg Tab)  6.25 mg PO BIDMEALS [facility]


   Last Admin: 21 08:31 Dose:  6.25 mg


   Documented by: 


Clopidogrel Bisulfate (Clopidogrel 75 Mg Tab)  75 mg PO DAILY [facility]


   Last Admin: 21 08:31 Dose:  75 mg


   Documented by: 


Dextrose/Water (50% Dextrose In Water 50 Ml Syringe)  50 ml IVPUSH Q15M PRN


   PRN Reason: Hypoglycemia


Glucagon (Glucagon,Human Recombinant 1 Mg Vial)  1 mg IM Q15M PRN


   PRN Reason: Hypoglycemia


Heparin Sodium (Porcine) (Heparin Sodium 5,000 Units/Ml Vial)  5,000 units 

SUBCUT Q12H [facility]


   Last Admin: 21 08:31 Dose:  5,000 units


   Documented by: 


Ceftriaxone Sodium 1 gm/ (Sodium Chloride)  50 mls @ 100 mls/hr IV Q24H [facility]


   Last Admin: 21 01:18 Dose:  100 mls/hr


   Documented by: 


Insulin Human Lispro (Insulin Lispro 100 Units/Ml 3 Ml Vial)  0 unit SUBCUT Q6H 

[facility]; Protocol


   Last Admin: 21 10:17 Dose:  Not Given


   Documented by: 


Brimonidine Tartrate /Timolol (Combigan) 0.2%-0.5% Eye Drops **Own Med**  1 drop

EYELF BID [facility]


   Last Admin: 21 08:36 Dose:  1 drop


   Documented by: 


Omeprazole (Omeprazole 20 Mg Cap.Cr)  20 mg PO ACBREAKFAST [facility]


   Last Admin: 21 06:10 Dose:  20 mg


   Documented by: 


Ondansetron HCl (Ondansetron 4 Mg/2 Ml Sdv)  4 mg IVPUSH Q4H PRN


   PRN Reason: Nausea/Vomiting


Oxybutynin Chloride (Oxybutynin 5 Mg Tab.Er)  10 mg PO BEDTIME [facility]


   Last Admin: 07/10/21 20:42 Dose:  10 mg


   Documented by: 


Senna/Docusate Sodium (Docusate Sodium/Sennosides 50-8.6 Mg Tab)  2 tab PO BID 

[facility]


   Last Admin: 21 08:30 Dose:  2 tab


   Documented by: 


Sodium Bicarbonate (Sodium Bicarbonate 650 Mg Tab)  650 mg PO TID [facility]


   Last Admin: 21 08:30 Dose:  650 mg


   Documented by: 


Sodium Chloride (Sodium Chloride 0.9% 10 Ml Syringe)  10 ml FLUSH ASDIRECTED PRN


   PRN Reason: Keep Vein Open





Discontinued Medications





Dextrose/Water (50% Dextrose In Water 50 Ml Syringe)  100 ml IVPUSH ONETIME ONE


   Stop: 07/10/21 08:30


   Last Admin: 07/10/21 09:22 Dose:  100 ml


   Documented by: 


Dextrose/Water (50% Dextrose In Water 50 Ml Syringe)  50 ml IVPUSH ONETIME ONE


   Stop: 07/10/21 21:24


   Last Admin: 07/10/21 21:30 Dose:  50 ml


   Documented by: 


Dextrose/Water (50% Dextrose In Water 50 Ml Syringe) Confirm Administered Dose 

50 ml .ROUTE .STK-MED ONE


   Stop: 07/10/21 21:23


   Last Admin: 07/10/21 21:37 Dose:  Not Given


   Documented by: 


Heparin Sodium (Porcine) (Heparin Sodium 5,000 Units/Ml Vial)  5,000 units 

SUBCUT Q12H [facility]


   Last Admin: 07/10/21 02:10 Dose:  Not Given


   Documented by: 


Potassium Chloride 10 meq/ (Premix)  100 mls @ 100 mls/hr IV ONETIME ONE


   Stop: 21 22:35


   Last Admin: 21 21:44 Dose:  100 mls/hr


   Documented by: 


Sodium Chloride (Normal Saline)  1,000 mls @ 50 mls/hr IV .BOLUS ONE


   Stop: 07/10/21 17:35


   Last Infusion: 07/10/21 08:09 Dose:  75 mls/hr


   Documented by: 


Sodium Chloride (Normal Saline)  1,000 mls @ 75 mls/hr IV ASDIRECTED [facility]ULA


Potassium Chloride 20 meq/ (Premix)  100 mls @ 50 mls/hr IV Q2H [facility]


   Stop: 07/10/21 06:59


   Last Admin: 07/10/21 06:06 Dose:  50 mls/hr


   Documented by: 


Insulin Regular in 0.9 % NACL (Myxredlin In Ns 100 Unit/100 Ml)  100 unit in 100

mls @ 2.5 drops/hr IV CONTINUOUS [facility]; Protocol


Sodium Chloride (Sodium Chloride 0.45%)  1,000 mls @ 50 mls/hr IV ASDIRECTED LULA


Insulin Regular in 0.9 % NACL (Myxredlin In Ns 100 Unit/100 Ml)  100 unit in 100

mls @ 2.5 mls/hr IV CONTINUOUS LULA; Protocol


   Last Titration: 07/10/21 21:24 Dose:  0 mls/hr, 0 mls/hr


   Documented by: 


Dextrose/Sodium Chloride (Dextrose 5%-1/2 Ns)  1,000 mls @ 75 mls/hr IV 

ASDIRECTED LULA


   Last Admin: 07/10/21 20:41 Dose:  75 mls/hr


   Documented by: 


Potassium Chloride 20 meq/ (Premix)  100 mls @ 50 mls/hr IV Q2H LULA


   Stop: 07/10/21 20:59


   Last Admin: 07/10/21 20:02 Dose:  50 mls/hr


   Documented by: 


Sodium Chloride (Normal Saline)  1,000 mls @ 75 mls/hr IV ASDIRECTED LULA


   Last Admin: 07/10/21 22:26 Dose:  75 mls/hr


   Documented by: 


Ondansetron HCl (Ondansetron 4 Mg/2 Ml Sdv)  4 mg IVPUSH ONETIME ONE


   Stop: 21 20:27


   Last Admin: 21 20:40 Dose:  4 mg


   Documented by: 


Pantoprazole Sodium (Pantoprazole 40 Mg Vial)  40 mg IVPUSH ONETIME ONE


   Stop: 21 20:27


   Last Admin: 21 20:43 Dose:  40 mg


   Documented by: 


Potassium Chloride (Potassium Chloride 10 Meq Tab.Er)  40 meq PO Q1H LULA


   Stop: 07/10/21 15:01


   Last Admin: 07/10/21 18:19 Dose:  Not Given


   Documented by: 


Potassium Chloride (Potassium Chloride 10 Meq Tab.Er)  40 meq PO Q1H LULA


   Stop: 07/10/21 18:01


   Last Admin: 07/10/21 18:19 Dose:  40 meq


   Documented by: 


Sodium Bicarbonate (Sodium Bicarbonate 650 Mg Tab)  650 mg PO BID [facility]


   Last Admin: 07/10/21 20:42 Dose:  650 mg


   Documented by: 


Sodium Polystyrene Sulfonate (Sodium Polystyrene Sulfonate 15 Gm/60 Ml Susp 60 

Ml Bot)  30 gm PO ONETIME ONE


   Stop: 21 07:20


   Last Admin: 21 07:33 Dose:  30 gm


   Documented by: 











- Exam


General: Reports: Alert, Oriented


HEENT: Reports: Pupils Equal, Pupils Reactive, EOMI, Mucous Membr. Moist/Pink


Neck: Reports: Supple


Lungs: Reports: Clear to Auscultation, Normal Respiratory Effort


Cardiovascular: Reports: Regular Rate, Regular Rhythm


GI/Abdominal Exam: Normal Bowel Sounds, Soft, Non-Tender, No Organomegaly, No 

Distention, No Abnormal Bruit, No Mass, Pelvis Stable


Back Exam: Reports: Normal Inspection, Full Range of Motion


Extremities: Other (Patient status post bilateral lower extremity amputation)


Skin: Reports: Warm, Dry, Intact


Wound/Incisions: Reports: Healing Well


Neurological: Reports: No New Focal Deficit


Psy/Mental Status: Reports: Alert, Normal Affect, Normal Mood

## 2021-09-26 ENCOUNTER — HOSPITAL ENCOUNTER (EMERGENCY)
Dept: HOSPITAL 43 - DL.ED | Age: 76
Discharge: HOME | End: 2021-09-26
Payer: MEDICARE

## 2021-09-26 VITALS — DIASTOLIC BLOOD PRESSURE: 70 MMHG | HEART RATE: 64 BPM | SYSTOLIC BLOOD PRESSURE: 186 MMHG

## 2021-09-26 DIAGNOSIS — Z89.611: ICD-10-CM

## 2021-09-26 DIAGNOSIS — I25.2: ICD-10-CM

## 2021-09-26 DIAGNOSIS — Z88.8: ICD-10-CM

## 2021-09-26 DIAGNOSIS — M19.90: ICD-10-CM

## 2021-09-26 DIAGNOSIS — Z88.0: ICD-10-CM

## 2021-09-26 DIAGNOSIS — Z79.899: ICD-10-CM

## 2021-09-26 DIAGNOSIS — Z88.5: ICD-10-CM

## 2021-09-26 DIAGNOSIS — E11.40: ICD-10-CM

## 2021-09-26 DIAGNOSIS — I50.9: ICD-10-CM

## 2021-09-26 DIAGNOSIS — I11.0: ICD-10-CM

## 2021-09-26 DIAGNOSIS — N39.0: ICD-10-CM

## 2021-09-26 DIAGNOSIS — G45.9: ICD-10-CM

## 2021-09-26 DIAGNOSIS — Z89.612: ICD-10-CM

## 2021-09-26 DIAGNOSIS — Z79.82: ICD-10-CM

## 2021-09-26 DIAGNOSIS — I45.10: ICD-10-CM

## 2021-09-26 DIAGNOSIS — D64.9: ICD-10-CM

## 2021-09-26 DIAGNOSIS — T83.512A: Primary | ICD-10-CM

## 2021-09-26 DIAGNOSIS — I25.10: ICD-10-CM

## 2021-09-26 DIAGNOSIS — Z79.02: ICD-10-CM

## 2021-09-26 LAB
ANION GAP SERPL CALC-SCNC: 16.4 MEQ/L (ref 7–13)
CHLORIDE SERPL-SCNC: 111 MMOL/L (ref 98–107)
SODIUM SERPL-SCNC: 143 MMOL/L (ref 136–145)

## 2021-09-26 NOTE — EDM.PDOC
ED HPI GENERAL MEDICAL PROBLEM





- General


Chief Complaint: Neuro Symptoms/Deficits


Stated Complaint: [id number] MOUTH DROPING AND SLURRING


Time Seen by Provider: 21 12:06


Source of Information: Reports: Patient, Family, RN, RN Notes Reviewed


History Limitations: Reports: No Limitations





- History of Present Illness


INITIAL COMMENTS - FREE TEXT/NARRATIVE: 


Patient is a 75-year-old female who presents to ER with her daughter with 

complaint of left-sided facial drooping and slurred speech this morning.  

Daughter states slurred speech began on and off yesterday, she states this 

morning about 10:00 she thought that the left side of her face was drooping.  

Patient and daughter state the symptoms have improved since arriving at the ER. 

Patient denies any complaints of.  Patient has bilateral AKA, and nephrostomy 

tubes.  Patient has a history of chronic anemia and kidney disease. NIHSS = 2


Patient had a little difficulty with seeing pictures and words for the NIHSS 

scale. Patient is blind in the left eye and vision is poor in the right eye. 


Patient is a heather lift from wheelchair to bed.





Onset: Today, Sudden





- Related Data


                                    Allergies











Allergy/AdvReac Type Severity Reaction Status Date / Time


 


gemfibrozil [From Lopid] Allergy  Cannot Verified 21 12:17





   Remember  


 


Penicillins Allergy  Rash Verified 21 12:17


 


propranolol HCl Allergy  Cannot Verified 21 12:17





[From Inderal LA]   Remember  


 


codeine AdvReac  Irritabilit Verified 21 12:17





   y  


 


metformin AdvReac  Shaking Verified 21 12:17











Home Meds: 


                                    Home Meds





Aspirin [Lo-Dose Aspirin EC] 81 mg PO DAILY 19 [History]


Clopidogrel [Plavix] 75 mg PO DAILY 19 [History]


Ascorbic Acid 500 mg PO BIDMEALS 03/10/21 [History]


Brimonidine Tartrate/Timolol [Combigan 0.2%-0.5% Eye Drops] 1 drop EYERT BID 

03/10/21 [History]


Omeprazole 20 mg PO DAILY 03/10/21 [History]


Bumetanide [Bumex] 1 mg PO DAILY #30 tablet 21 [Rx]


Multivitamin with Minerals [Multiple Vitamin] 1 tab PO WITHBREAKFAST 21 

[History]


carvediloL [Carvedilol] 6.25 mg PO BIDMEALS 21 [History]


Oxybutynin [Oxybutynin  ER] 10 mg PO BEDTIME  tab.er 21 [Rx]


Docusate Sodium/Sennosides [Senna Plus] 2 tab PO BID  tablet 21 [Rx]


Magnesium Hydroxide [Milk of Magnesia] 30 ml PO Q12H  cup 21 [Rx]


Potassium Chloride [Potassium Chloride Solution] 29.3 meq PO DAILY  cup 21

[Rx]











Past Medical History


HEENT History: Reports: Cataract, Impaired Vision


Other HEENT History: wears glasses


Cardiovascular History: Reports: Bypass, CAD, Heart Failure, High Cholesterol, 

Hypertension, MI, Stents


Respiratory History: Reports: None


Gastrointestinal History: Reports: Bowel Obstruction, Chronic Constipation


Genitourinary History: Reports: UTI, Recurrent


OB/GYN History: Reports: Pregnancy


Musculoskeletal History: Reports: Osteoarthritis


Neurological History: Reports: Neuropathy, Diabetic, Other (See Below)


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


Psychiatric History: Reports: None


Endocrine/Metabolic History: Reports: Diabetes, Type II


Hematologic History: Reports: Anemia, Blood Transfusion(s)


Immunologic History: Reports: None


Oncologic (Cancer) History: Reports: None


Dermatologic History: Reports: None





- Infectious Disease History


Infectious Disease History: Reports: None





- Past Surgical History


Head Surgeries/Procedures: Reports: None


HEENT Surgical History: Reports: Cataract Surgery


Cardiovascular Surgical History: Reports: Coronary Artery Bypass


GI Surgical History: Reports: Hernia Repair/Other


Female  Surgical History: Reports: Other (See Below)


Other Female  Surgeries/Procedures: bladder surgery, urostomy


Neurological Surgical History: Reports: None


Musculoskeletal Surgical History: Reports: Amputation, Other (See Below)


Other Musculoskeletal Surgeries/Procedures:: carpal tunnel surgery, amputation 

 left bka and  right aka toal amputation of bilateral lower 

extremities above the knee





Social & Family History





- Family History


Family Medical History: No Pertinent Family History





- Tobacco Use


Tobacco Use Status *Q: Unknown Ever Used Tobacco





- Caffeine Use


Caffeine Use: Reports: Coffee





- Recreational Drug Use


Recreational Drug Use: No





- Living Situation & Occupation


Living situation: Reports: 


Occupation: Retired





ED ROS GENERAL





- Review of Systems


Review Of Systems: Comprehensive ROS is negative, except as noted in HPI.





ED EXAM, NEURO





- Physical Exam


Exam: See Below


Exam Limited By: No Limitations


General Appearance: Alert, WD/WN, No Apparent Distress


Eye Exam: Bilateral Eye: EOMI, Normal Inspection, PERRL (3 brisk)


Ears: Normal External Exam, Hearing Grossly Normal


Nose: Normal Inspection


Throat/Mouth: Normal Inspection, Normal Voice, No Airway Compromise, Other 

(missing several teeth)


Head Exam: Atraumatic, Normocephalic


Neck: Normal Inspection, Supple, Non-Tender, Full Range of Motion


Respiratory/Chest: No Respiratory Distress, No Accessory Muscle Use, Chest Non-

Tender, Crackles (bases bilaterally)


Cardiovascular: Normal Peripheral Pulses, Regular Rate, Rhythm, No Edema, No 

Gallop, No JVD, No Murmur, No Rub


GI/Abdominal: Normal Bowel Sounds, Soft, Non-Tender


 (Female) Exam: Deferred


Rectal (Female) Exam: Deferred


Neurological: Alert, Normal Mood/Affect, CN II-XII Intact, Oriented x 3


Back Exam: Normal Inspection, Full Range of Motion


Extremities: Other (Bilateral AKA)


Psychiatric: Normal Affect, Normal Mood


Skin Exam: Warm, Dry, Intact, Normal Color, No Rash


  ** #1 Interpretation


EKG Date: 21


Time: 12:03


Rhythm: NSR


Rate (Beats/Min): 53


Axis: Normal


P-Wave: Present


QRS: RBBB


Comparison: Change From Previous EKG





Course





- Vital Signs


Last Recorded V/S: 


                                Last Vital Signs











Temp  97.4 F   21 12:06


 


Pulse  64   21 12:06


 


Resp  12   21 12:06


 


BP  186/70 H  21 12:06


 


Pulse Ox  100   21 12:06














- Orders/Labs/Meds


Orders: 


                               Active Orders 24 hr











 Category Date Time Status


 


 CULTURE BLOOD [BC] Stat Lab  21 12:25 Received


 


 CULTURE URINE [RM] Stat Lab  21 13:08 Received


 


 Blood Culture x2 Reflex Set [OM.PC] Stat Oth  21 11:55 Ordered











Labs: 


                                Laboratory Tests











  21 Range/Units





  11:39 12:25 12:25 


 


WBC   6.8   (5.0-10.0)  10^3/uL


 


RBC   3.23 L   (4.2-5.4)  10^6/uL


 


Hgb   9.5 L   (12.0-16.0)  g/dL


 


Hct   30.4 L   (37.0-47.0)  %


 


MCV   94.1  D   ()  fL


 


MCH   29.4   (27.0-34.0)  pg


 


MCHC   31.3 L   (33.0-35.0)  g/dL


 


Plt Count   301   (150-450)  10^3/uL


 


Neut % (Auto)   48.7   (42.2-75.2)  %


 


Lymph % (Auto)   32.8   (20.5-50.1)  %


 


Mono % (Auto)   9.1 H   (2-8)  %


 


Eos % (Auto)   8.4 H   (1.0-3.0)  %


 


Baso % (Auto)   1.0   (0.0-1.0)  %


 


ESR   57 H   (0-20)  mm/hr


 


PT    10.4  (9.0-12.0)  SEC


 


INR    1.0  (0.9-1.2)  


 


Sodium     (136-145)  mmol/L


 


Potassium     (3.5-5.1)  mmol/L


 


Chloride     ()  mmol/L


 


Carbon Dioxide     (21-32)  mmol/L


 


Anion Gap     (7-13)  mEq/L


 


BUN     (7-18)  mg/dL


 


Creatinine     (0.55-1.02)  mg/dL


 


Est Cr Clr Drug Dosing     


 


Estimated GFR (MDRD)     


 


BUN/Creatinine Ratio     (No establ ref range)  


 


Glucose     (70-99)  mg/dL


 


POC Glucose  91    (70-99)  mg/dL


 


Lactic Acid     (0.4-2.0)  mmol/L


 


Calcium     (8.5-10.1)  mg/dL


 


Total Bilirubin     (0.2-1.0)  mg/dL


 


AST     (15-37)  U/L


 


ALT     (14-59)  U/L


 


Alkaline Phosphatase     ()  U/L


 


Troponin I High Sens     (<=51)  pg/mL


 


C-Reactive Protein     (0.0-0.9)  mg/dL


 


Total Protein     (6.4-8.2)  g/dL


 


Albumin     (3.4-5.0)  g/dL


 


Globulin     


 


Albumin/Globulin Ratio     


 


Urine Color     (YELLOW)  


 


Urine Appearance     (CLEAR)  


 


Urine pH     (5.0-9.0)  


 


Ur Specific Gravity     (1.005-1.030)  


 


Urine Protein     (NEGATIVE)  


 


Urine Glucose (UA)     (NEGATIVE)  


 


Urine Ketones     (NEGATIVE)  


 


Urine Occult Blood     (NEGATIVE)  


 


Urine Nitrite     (NEGATIVE)  


 


Urine Bilirubin     (NEGATIVE)  


 


Urine Urobilinogen     (0.2-1.0)  mg/dL


 


Ur Leukocyte Esterase     (NEGATIVE)  


 


Urine RBC     (0-5)  /HPF


 


Urine WBC     (0-5/HPF)  /HPF


 


Ur Epithelial Cells     (NOT SEEN)  /HPF


 


Urine Bacteria     (0-FEW/HPF)  /HPF














  21 Range/Units





  12:25 12:25 13:08 


 


WBC     (5.0-10.0)  10^3/uL


 


RBC     (4.2-5.4)  10^6/uL


 


Hgb     (12.0-16.0)  g/dL


 


Hct     (37.0-47.0)  %


 


MCV     ()  fL


 


MCH     (27.0-34.0)  pg


 


MCHC     (33.0-35.0)  g/dL


 


Plt Count     (150-450)  10^3/uL


 


Neut % (Auto)     (42.2-75.2)  %


 


Lymph % (Auto)     (20.5-50.1)  %


 


Mono % (Auto)     (2-8)  %


 


Eos % (Auto)     (1.0-3.0)  %


 


Baso % (Auto)     (0.0-1.0)  %


 


ESR     (0-20)  mm/hr


 


PT     (9.0-12.0)  SEC


 


INR     (0.9-1.2)  


 


Sodium  143    (136-145)  mmol/L


 


Potassium  3.4 L    (3.5-5.1)  mmol/L


 


Chloride  111 H    ()  mmol/L


 


Carbon Dioxide  19 L    (21-32)  mmol/L


 


Anion Gap  16.4 H    (7-13)  mEq/L


 


BUN  44 H    (7-18)  mg/dL


 


Creatinine  1.50 H    (0.55-1.02)  mg/dL


 


Est Cr Clr Drug Dosing  TNP    


 


Estimated GFR (MDRD)  34    


 


BUN/Creatinine Ratio  29.3    (No establ ref range)  


 


Glucose  90    (70-99)  mg/dL


 


POC Glucose     (70-99)  mg/dL


 


Lactic Acid   0.3 L   (0.4-2.0)  mmol/L


 


Calcium  8.7    (8.5-10.1)  mg/dL


 


Total Bilirubin  0.3    (0.2-1.0)  mg/dL


 


AST  16    (15-37)  U/L


 


ALT  18    (14-59)  U/L


 


Alkaline Phosphatase  80    ()  U/L


 


Troponin I High Sens  15    (<=51)  pg/mL


 


C-Reactive Protein  < 0.2    (0.0-0.9)  mg/dL


 


Total Protein  6.4    (6.4-8.2)  g/dL


 


Albumin  2.4 L    (3.4-5.0)  g/dL


 


Globulin  4.0    


 


Albumin/Globulin Ratio  0.60    


 


Urine Color    Yellow  (YELLOW)  


 


Urine Appearance    Cloudy  (CLEAR)  


 


Urine pH    7.0  (5.0-9.0)  


 


Ur Specific Gravity    1.025  (1.005-1.030)  


 


Urine Protein    >=300 H  (NEGATIVE)  


 


Urine Glucose (UA)    Negative  (NEGATIVE)  


 


Urine Ketones    Negative  (NEGATIVE)  


 


Urine Occult Blood    Moderate H  (NEGATIVE)  


 


Urine Nitrite    Negative  (NEGATIVE)  


 


Urine Bilirubin    Negative  (NEGATIVE)  


 


Urine Urobilinogen    0.2  (0.2-1.0)  mg/dL


 


Ur Leukocyte Esterase    Large H  (NEGATIVE)  


 


Urine RBC    50-75 H  (0-5)  /HPF


 


Urine WBC    Packed H  (0-5/HPF)  /HPF


 


Ur Epithelial Cells    Not seen  (NOT SEEN)  /HPF


 


Urine Bacteria    Many H  (0-FEW/HPF)  /HPF














- Radiology Interpretation


Free Text/Narrative:: 


Head CT wo contrast:


[facility] 


Final Radiology Report  Call: [phone]


assistance Online chat: [email or web address]"


Name: ANA CORDON Age: 75Years F Date: 2021


MRN: [id number] SSN: -- : [id number]


Study: CT HEAD WO CONT Requesting Physician: Cady Guaman


Accession: [id number] Images: [id number]


Addl Studies:


Provided Clinical History: slurred speech, facial droop


Contrast: Without Contrast Medium:


Contrast Amount: Contrast Method:


Page 1 of 2


PROCEDURE INFORMATION:


Exam: CT Head Without Contrast


Exam date and time: 2021 12:13 PM


Age: 75 years old


Clinical indication: Other: Slurred speech, facial droop


TECHNIQUE:


Imaging protocol: Computed tomography of the head without contrast.


Radiation optimization: All CT scans at this facility use at least one of these 

dose optimization


techniques: automated exposure control; mA and/or kV adjustment per patient size

 (includes targeted


exams where dose is matched to clinical indication); or iterative 

reconstruction.


Other technique: STROKE PROTOCOL was implemented.


COMPARISON:


CT Head wo Cont 2021 11:50 PM


FINDINGS:


Brain: There is mild atrophy and mild to moderate microangiopathy. There are 

remote multifocal


lacunar infarcts seen within the basal ganglia. A subtle area of low attenuation

 within the left azra


with seen on the prior examination. This could be artifactual or related to an 

old infarct.


Cerebral ventricles: No ventriculomegaly.


Paranasal sinuses: Visualized sinuses are unremarkable. No fluid levels.


Mastoid air cells: Visualized mastoid air cells are well aerated.


Bones/joints: Unremarkable. No acute fracture.


Soft tissues: Unremarkable.


IMPRESSION:


There is mild to moderate microangiopathy and multifocal remote infarcts. No 

definite acute


intracranial process is seen.


ASSESSMENT:


ASPECTS ([place] Stroke Program Early CT Score) is 10.


Thank you for allowing us to participate in the care of your patient.


Dictated and Authenticated by: Freddie Inman MD


2021 12:20 PM Central Time ([place] & [place])








See rad report








Departure





- Departure


Time of Disposition: 13:39


Disposition: Home, Self-Care 01


Condition: Fair


Clinical Impression: 


 Chronic anemia, TIA (transient ischemic attack), S/P AKA (above knee 

amputation) bilateral





UTI (urinary tract infection) due to urinary indwelling catheter


Qualifiers:


 Indwelling urinary catheter type: nephrostomy catheter Encounter type: initial 

encounter Qualified Code(s): T83.512A - Infection and inflammatory reaction due 

to nephrostomy catheter, initial encounter








- Discharge Information


*PRESCRIPTION DRUG MONITORING PROGRAM REVIEWED*: No


*COPY OF PRESCRIPTION DRUG MONITORING REPORT IN PATIENT GUNJAN: No


Instructions:  Anemia, Transient Ischemic Attack, Easy-to-Read


Forms:  ED Department Discharge


Additional Instructions: 


Return to ER with any worsening of problems


Follow up with your primary care facility next week with further problems


RX: Cipro 500mg orally twice daily for 7 days








Sepsis Event Note (ED)





- Focused Exam


Vital Signs: 


                                   Vital Signs











  Temp Pulse Resp BP Pulse Ox


 


 21 12:06  97.4 F  64  12  186/70 H  100














- My Orders


Last 24 Hours: 


My Active Orders





21 11:55


Blood Culture x2 Reflex Set [OM.PC] Stat 





21 12:25


CULTURE BLOOD [BC] Stat 





21 13:08


CULTURE URINE [RM] Stat 














- Assessment/Plan


Last 24 Hours: 


My Active Orders





21 11:55


Blood Culture x2 Reflex Set [OM.PC] Stat 





21 12:25


CULTURE BLOOD [BC] Stat 





21 13:08


CULTURE URINE [RM] Stat

## 2021-09-26 NOTE — CT
PROCEDURE INFORMATION: 

Exam: CT Head Without Contrast 

Exam date and time: 9/26/2021 12:13 PM 

Age: 75 years old 

Clinical indication: Other: Slurred speech, facial droop 



TECHNIQUE: 

Imaging protocol: Computed tomography of the head without contrast. 

Radiation optimization: All CT scans at this facility use at least one of these 

dose optimization techniques: automated exposure control; mA and/or kV 

adjustment per patient size (includes targeted exams where dose is matched to 

clinical indication); or iterative reconstruction. 

Other technique: STROKE PROTOCOL was implemented. 



COMPARISON: 

CT Head wo Cont 8/8/2021 11:50 PM 



FINDINGS: 

Brain: There is mild atrophy and mild to moderate microangiopathy. There are 

remote multifocal lacunar infarcts seen within the basal ganglia. A subtle area 

of low attenuation within the left azra with seen on the prior examination. 

This could be artifactual or related to an old infarct. 

Cerebral ventricles: No ventriculomegaly. 

Paranasal sinuses: Visualized sinuses are unremarkable. No fluid levels. 

Mastoid air cells: Visualized mastoid air cells are well aerated. 

Bones/joints: Unremarkable. No acute fracture. 

Soft tissues: Unremarkable. 



IMPRESSION: 

There is mild to moderate microangiopathy and multifocal remote infarcts. No 

definite acute intracranial process is seen. 



ASSESSMENT: 

ASPECTS (Alberta Stroke Program Early CT Score) is 10.

## 2022-09-08 NOTE — CT
PROCEDURE INFORMATION: 

Exam: CT Abdomen And Pelvis Without Contrast 

Exam date and time: 7/9/2021 9:05 PM 

Age: 75 years old 

Clinical indication: Nausea and vomiting; Additional info: Pain, nvd, 

distension 



TECHNIQUE: 

Imaging protocol: Computed tomography of the abdomen and pelvis without 

contrast. 

Radiation optimization: All CT scans at this facility use at least one of these 

dose optimization techniques: automated exposure control; mA and/or kV 

adjustment per patient size (includes targeted exams where dose is matched to 

clinical indication); or iterative reconstruction. 



COMPARISON: 

CT Abdomen Pelvis wo Cont 6/6/2021 12:36 AM 



FINDINGS: 

Lungs: There are strandy and patchy opacities in the lung bases bilaterally, 

left more prominent than right compatible with atelectasis. Bilateral basilar 

infiltrates and pneumonia cannot be entirely excluded. 

Pleural spaces: A tiny left pleural effusion is seen. 



Liver: Normal. No mass. 

Gallbladder and bile ducts: Calcifications are seen layering within the 

dependent portion of the gallbladder compatible with gallstones. 

Pancreas: Normal. No ductal dilation. 

Spleen: Normal. No splenomegaly. 

Adrenal glands: Normal. No mass. 

Kidneys and ureters: Patient is status post a left percutaneous nephrostomy. 

Catheter appears unchanged in position compared with 06/06/2020. Some gas 

densities persist within the left renal pelvis. There are some hazy opacity 

seen surrounding the renal calices and renal pelvis on the left likely 

representing mild inflammatory changes. 

Stomach and bowel: Dilated small bowel loops containing fluid and air fluid 

levels are seen with less dilated small bowel loops present distally, findings 

that could represent partial small bowel obstruction. Air-fluid levels are seen 

within the colon as well and fluid-filled rectal vault is seen. Diffuse large 

and small bowel enteritis is another possibility. 

Appendix: The appendix is visualized and is normal configuration. 



Intraperitoneal space: A trace volume of ascites is seen adjacent to the liver 

tip within the right pericolic gutter. 

Vasculature: Prominent calcifications are seen within the coronary arteries. 

Calcifications are present within the thoracic and abdominal aorta, iliac 

arteries and femoral arteries bilaterally as well as within the branches of the 

celiac, superior and inferior mesenteric arteries and renal arteries. 

Lymph nodes: Unremarkable. No enlarged lymph nodes. 

Urinary bladder: Prominent volume air is seen intraluminally within the 

bladder. This may be secondary to prior instrumentation. There is mild bladder 

wall thickening present. Chronic cystitis could have this appearance. 

Reproductive: Unremarkable as visualized. 

Bones/joints: Unremarkable. No acute fracture. 

Soft tissues: Unremarkable. 



IMPRESSION: 

1. The patient is status post a left percutaneous nephrostomy. There are 

persistent gas densities present within the left renal pelvis. Hazy opacities 

surround the renal calices and renal pelvis on the left possibly representing 

mild inflammatory changes. Pyelonephritis and ureteritis cannot be entirely 

excluded. 

2. Dilated small bowel loops containing fluid and air fluid levels are seen . 

There is no definite transition although a partial small bowel obstruction 

cannot be entirely excluded. However, the colon contains fluid and air fluid 

levels as well and a diffuse small and large bowel enteritis is a possibility. 

3. Normal appendix 

4. No evidence for ureteral obstruction 

5. There is mild bladder wall thickening and moderate intraluminal air present 

within the bladder possibly secondary to instrumentation although chronic 

cystitis could have this appearance. 

6. Multiple gallstones 

7. Strandy and patchy opacities are present the lung bases bilaterally, left 

prominent than right, likely representing atelectasis although a bilateral 

basilar pneumonia cannot be entirely excluded. 



COMMENTS: 

Consistent with the American College of Radiology's Incidental Findings 

Committee white paper (J Am Blaine Radiol 2018): Any incidental renal lesion less 

than 1 cm or classified as too small to characterize, or any incidental cystic 

renal lesion characterized as simple-appearing, is likely benign. No follow-up 

imaging is recommended for these lesions per consensus recommendations based on 

imaging criteria. able